# Patient Record
Sex: FEMALE | Race: WHITE | NOT HISPANIC OR LATINO | Employment: FULL TIME | URBAN - METROPOLITAN AREA
[De-identification: names, ages, dates, MRNs, and addresses within clinical notes are randomized per-mention and may not be internally consistent; named-entity substitution may affect disease eponyms.]

---

## 2017-01-09 ENCOUNTER — ALLSCRIPTS OFFICE VISIT (OUTPATIENT)
Dept: OTHER | Facility: OTHER | Age: 42
End: 2017-01-09

## 2017-04-12 ENCOUNTER — ALLSCRIPTS OFFICE VISIT (OUTPATIENT)
Dept: OTHER | Facility: OTHER | Age: 42
End: 2017-04-12

## 2017-04-24 ENCOUNTER — APPOINTMENT (OUTPATIENT)
Dept: URGENT CARE | Age: 42
End: 2017-04-24
Payer: OTHER MISCELLANEOUS

## 2017-04-24 ENCOUNTER — TRANSCRIBE ORDERS (OUTPATIENT)
Dept: URGENT CARE | Age: 42
End: 2017-04-24

## 2017-04-24 ENCOUNTER — HOSPITAL ENCOUNTER (OUTPATIENT)
Dept: RADIOLOGY | Age: 42
Discharge: HOME | End: 2017-04-24
Payer: OTHER MISCELLANEOUS

## 2017-04-24 DIAGNOSIS — M79.671 RIGHT FOOT PAIN: ICD-10-CM

## 2017-04-24 DIAGNOSIS — T14.90XA INJURY: ICD-10-CM

## 2017-04-24 DIAGNOSIS — T14.90XA INJURY: Primary | ICD-10-CM

## 2017-04-24 PROCEDURE — G0382 LEV 3 HOSP TYPE B ED VISIT: HCPCS | Performed by: PREVENTIVE MEDICINE

## 2017-04-24 PROCEDURE — 72100 X-RAY EXAM L-S SPINE 2/3 VWS: CPT

## 2017-04-24 PROCEDURE — 99283 EMERGENCY DEPT VISIT LOW MDM: CPT | Performed by: PREVENTIVE MEDICINE

## 2017-04-28 ENCOUNTER — APPOINTMENT (OUTPATIENT)
Dept: URGENT CARE | Age: 42
End: 2017-04-28
Payer: OTHER MISCELLANEOUS

## 2017-04-28 PROCEDURE — 99213 OFFICE O/P EST LOW 20 MIN: CPT | Performed by: PREVENTIVE MEDICINE

## 2017-05-05 ENCOUNTER — HOSPITAL ENCOUNTER (OUTPATIENT)
Dept: RADIOLOGY | Age: 42
Discharge: HOME | End: 2017-05-05
Payer: COMMERCIAL

## 2017-05-05 ENCOUNTER — ALLSCRIPTS OFFICE VISIT (OUTPATIENT)
Dept: OTHER | Facility: OTHER | Age: 42
End: 2017-05-05

## 2017-05-05 DIAGNOSIS — M79.671 PAIN OF RIGHT FOOT: ICD-10-CM

## 2017-05-05 PROCEDURE — 73630 X-RAY EXAM OF FOOT: CPT

## 2017-05-27 ENCOUNTER — APPOINTMENT (OUTPATIENT)
Dept: LAB | Facility: HOSPITAL | Age: 42
End: 2017-05-27
Payer: COMMERCIAL

## 2017-05-27 DIAGNOSIS — K91.2 POSTSURGICAL MALABSORPTION, NOT ELSEWHERE CLASSIFIED: ICD-10-CM

## 2017-05-27 DIAGNOSIS — E55.9 VITAMIN D DEFICIENCY: ICD-10-CM

## 2017-05-27 DIAGNOSIS — Z98.84 BARIATRIC SURGERY STATUS: ICD-10-CM

## 2017-05-27 DIAGNOSIS — E61.1 IRON DEFICIENCY: ICD-10-CM

## 2017-05-27 DIAGNOSIS — Z00.00 ENCOUNTER FOR GENERAL ADULT MEDICAL EXAMINATION WITHOUT ABNORMAL FINDINGS: ICD-10-CM

## 2017-05-27 DIAGNOSIS — E53.8 DEFICIENCY OF OTHER SPECIFIED B GROUP VITAMINS: ICD-10-CM

## 2017-05-27 LAB
ALBUMIN SERPL BCP-MCNC: 3.5 G/DL (ref 3.5–5)
ALP SERPL-CCNC: 84 U/L (ref 46–116)
ALT SERPL W P-5'-P-CCNC: 18 U/L (ref 12–78)
ANION GAP SERPL CALCULATED.3IONS-SCNC: 8 MMOL/L (ref 4–13)
AST SERPL W P-5'-P-CCNC: 18 U/L (ref 5–45)
BILIRUB SERPL-MCNC: 0.75 MG/DL (ref 0.2–1)
BUN SERPL-MCNC: 15 MG/DL (ref 5–25)
CALCIUM SERPL-MCNC: 8.3 MG/DL (ref 8.3–10.1)
CHLORIDE SERPL-SCNC: 106 MMOL/L (ref 100–108)
CO2 SERPL-SCNC: 27 MMOL/L (ref 21–32)
CREAT SERPL-MCNC: 0.65 MG/DL (ref 0.6–1.3)
ERYTHROCYTE [DISTWIDTH] IN BLOOD BY AUTOMATED COUNT: 15.9 % (ref 11.6–15.1)
GFR SERPL CREATININE-BSD FRML MDRD: >60 ML/MIN/1.73SQ M
GLUCOSE P FAST SERPL-MCNC: 87 MG/DL (ref 65–99)
HCT VFR BLD AUTO: 33.1 % (ref 34.8–46.1)
HGB BLD-MCNC: 10.1 G/DL (ref 11.5–15.4)
MCH RBC QN AUTO: 23.4 PG (ref 26.8–34.3)
MCHC RBC AUTO-ENTMCNC: 30.5 G/DL (ref 31.4–37.4)
MCV RBC AUTO: 77 FL (ref 82–98)
PLATELET # BLD AUTO: 292 THOUSANDS/UL (ref 149–390)
PMV BLD AUTO: 10 FL (ref 8.9–12.7)
POTASSIUM SERPL-SCNC: 3.7 MMOL/L (ref 3.5–5.3)
PROT SERPL-MCNC: 6.7 G/DL (ref 6.4–8.2)
RBC # BLD AUTO: 4.32 MILLION/UL (ref 3.81–5.12)
SODIUM SERPL-SCNC: 141 MMOL/L (ref 136–145)
WBC # BLD AUTO: 5.16 THOUSAND/UL (ref 4.31–10.16)

## 2017-05-27 PROCEDURE — 85027 COMPLETE CBC AUTOMATED: CPT

## 2017-05-27 PROCEDURE — 36415 COLL VENOUS BLD VENIPUNCTURE: CPT

## 2017-05-27 PROCEDURE — 80053 COMPREHEN METABOLIC PANEL: CPT

## 2017-05-30 ENCOUNTER — GENERIC CONVERSION - ENCOUNTER (OUTPATIENT)
Dept: OTHER | Facility: OTHER | Age: 42
End: 2017-05-30

## 2017-06-06 ENCOUNTER — ALLSCRIPTS OFFICE VISIT (OUTPATIENT)
Dept: OTHER | Facility: OTHER | Age: 42
End: 2017-06-06

## 2017-06-12 ENCOUNTER — GENERIC CONVERSION - ENCOUNTER (OUTPATIENT)
Dept: OTHER | Facility: OTHER | Age: 42
End: 2017-06-12

## 2017-06-12 ENCOUNTER — OFFICE VISIT (OUTPATIENT)
Dept: URGENT CARE | Age: 42
End: 2017-06-12
Payer: COMMERCIAL

## 2017-06-12 ENCOUNTER — APPOINTMENT (OUTPATIENT)
Dept: RADIOLOGY | Age: 42
End: 2017-06-12
Attending: PHYSICIAN ASSISTANT
Payer: COMMERCIAL

## 2017-06-12 ENCOUNTER — TRANSCRIBE ORDERS (OUTPATIENT)
Dept: URGENT CARE | Age: 42
End: 2017-06-12

## 2017-06-12 DIAGNOSIS — N92.0 EXCESSIVE AND FREQUENT MENSTRUATION WITH REGULAR CYCLE: ICD-10-CM

## 2017-06-12 DIAGNOSIS — D50.9 IRON DEFICIENCY ANEMIA: ICD-10-CM

## 2017-06-12 DIAGNOSIS — R07.81 PLEURODYNIA: ICD-10-CM

## 2017-06-12 DIAGNOSIS — R07.9 CHEST PAIN: ICD-10-CM

## 2017-06-12 DIAGNOSIS — Z78.0 ASYMPTOMATIC MENOPAUSAL STATE: ICD-10-CM

## 2017-06-12 DIAGNOSIS — N92.1 EXCESSIVE AND FREQUENT MENSTRUATION WITH IRREGULAR CYCLE: ICD-10-CM

## 2017-06-12 DIAGNOSIS — R29.890 LOSS OF HEIGHT: ICD-10-CM

## 2017-06-12 DIAGNOSIS — M40.209 KYPHOSIS: ICD-10-CM

## 2017-06-12 PROCEDURE — G0382 LEV 3 HOSP TYPE B ED VISIT: HCPCS | Performed by: FAMILY MEDICINE

## 2017-06-12 PROCEDURE — 71101 X-RAY EXAM UNILAT RIBS/CHEST: CPT

## 2017-06-13 ENCOUNTER — TRANSCRIBE ORDERS (OUTPATIENT)
Dept: ADMINISTRATIVE | Facility: HOSPITAL | Age: 42
End: 2017-06-13

## 2017-06-13 ENCOUNTER — ALLSCRIPTS OFFICE VISIT (OUTPATIENT)
Dept: OTHER | Facility: OTHER | Age: 42
End: 2017-06-13

## 2017-06-13 ENCOUNTER — HOSPITAL ENCOUNTER (OUTPATIENT)
Dept: CT IMAGING | Facility: HOSPITAL | Age: 42
Discharge: HOME | End: 2017-06-13
Attending: INTERNAL MEDICINE
Payer: COMMERCIAL

## 2017-06-13 DIAGNOSIS — R07.81 PLEURITIC CHEST PAIN: Primary | ICD-10-CM

## 2017-06-13 DIAGNOSIS — R07.81 PLEURODYNIA: ICD-10-CM

## 2017-06-13 PROCEDURE — 71275 CT ANGIOGRAPHY CHEST: CPT

## 2017-06-13 RX ADMIN — IOHEXOL 85 ML: 350 INJECTION, SOLUTION INTRAVENOUS at 14:09

## 2017-06-16 ENCOUNTER — ALLSCRIPTS OFFICE VISIT (OUTPATIENT)
Dept: OTHER | Facility: OTHER | Age: 42
End: 2017-06-16

## 2017-06-16 ENCOUNTER — APPOINTMENT (OUTPATIENT)
Dept: LAB | Facility: HOSPITAL | Age: 42
End: 2017-06-16
Attending: INTERNAL MEDICINE
Payer: COMMERCIAL

## 2017-06-16 DIAGNOSIS — R07.9 CHEST PAIN: ICD-10-CM

## 2017-06-16 DIAGNOSIS — D50.9 IRON DEFICIENCY ANEMIA: ICD-10-CM

## 2017-06-16 DIAGNOSIS — N92.0 EXCESSIVE AND FREQUENT MENSTRUATION WITH REGULAR CYCLE: ICD-10-CM

## 2017-06-16 LAB
IRON SATN MFR SERPL: 5 %
IRON SERPL-MCNC: 25 UG/DL (ref 50–170)
TIBC SERPL-MCNC: 458 UG/DL (ref 250–450)

## 2017-06-16 PROCEDURE — 83550 IRON BINDING TEST: CPT

## 2017-06-16 PROCEDURE — 83540 ASSAY OF IRON: CPT

## 2017-06-17 ENCOUNTER — APPOINTMENT (OUTPATIENT)
Dept: LAB | Facility: HOSPITAL | Age: 42
End: 2017-06-17
Attending: INTERNAL MEDICINE
Payer: COMMERCIAL

## 2017-06-17 DIAGNOSIS — R07.9 CHEST PAIN: ICD-10-CM

## 2017-06-17 DIAGNOSIS — N92.0 EXCESSIVE AND FREQUENT MENSTRUATION WITH REGULAR CYCLE: ICD-10-CM

## 2017-06-17 DIAGNOSIS — D50.9 IRON DEFICIENCY ANEMIA: ICD-10-CM

## 2017-06-17 LAB — HEMOCCULT STL QL IA: NEGATIVE

## 2017-06-17 PROCEDURE — G0328 FECAL BLOOD SCRN IMMUNOASSAY: HCPCS

## 2017-06-21 ENCOUNTER — GENERIC CONVERSION - ENCOUNTER (OUTPATIENT)
Dept: OTHER | Facility: OTHER | Age: 42
End: 2017-06-21

## 2017-06-21 RX ORDER — SODIUM CHLORIDE 9 MG/ML
20 INJECTION, SOLUTION INTRAVENOUS CONTINUOUS
Status: DISCONTINUED | OUTPATIENT
Start: 2017-06-22 | End: 2017-06-25 | Stop reason: HOSPADM

## 2017-06-22 ENCOUNTER — HOSPITAL ENCOUNTER (OUTPATIENT)
Dept: INFUSION CENTER | Facility: CLINIC | Age: 42
Discharge: HOME | End: 2017-06-22
Payer: COMMERCIAL

## 2017-06-22 VITALS
DIASTOLIC BLOOD PRESSURE: 69 MMHG | TEMPERATURE: 97.6 F | HEART RATE: 67 BPM | SYSTOLIC BLOOD PRESSURE: 111 MMHG | RESPIRATION RATE: 16 BRPM

## 2017-06-22 PROCEDURE — 96367 TX/PROPH/DG ADDL SEQ IV INF: CPT

## 2017-06-22 PROCEDURE — 96365 THER/PROPH/DIAG IV INF INIT: CPT

## 2017-06-22 RX ORDER — FERROUS SULFATE 325(65) MG
325 TABLET ORAL 2 TIMES DAILY
COMMUNITY
End: 2022-05-03

## 2017-06-22 RX ORDER — CHOLECALCIFEROL (VITAMIN D3) 125 MCG
500 CAPSULE ORAL DAILY
COMMUNITY
End: 2022-06-13

## 2017-06-22 RX ORDER — DIPHENOXYLATE HYDROCHLORIDE AND ATROPINE SULFATE 2.5; .025 MG/1; MG/1
1 TABLET ORAL 2 TIMES DAILY
COMMUNITY
End: 2022-06-13

## 2017-06-22 RX ADMIN — SODIUM CHLORIDE 20 ML/HR: 0.9 INJECTION, SOLUTION INTRAVENOUS at 08:50

## 2017-06-22 RX ADMIN — IRON SUCROSE 200 MG: 20 INJECTION, SOLUTION INTRAVENOUS at 09:12

## 2017-06-22 RX ADMIN — DIPHENHYDRAMINE HYDROCHLORIDE 25 MG: 50 INJECTION, SOLUTION INTRAMUSCULAR; INTRAVENOUS at 08:55

## 2017-06-22 NOTE — PROGRESS NOTES
Pt here for first Venofer infusion  Benadryl premed given as ordered  Pt tolerated Venofer well, no c/o discomfort  Pt oriented to unit and nursing routines  Intake assessment completed  AVS given, pt aware of next appointment

## 2017-06-23 ENCOUNTER — ALLSCRIPTS OFFICE VISIT (OUTPATIENT)
Dept: OTHER | Facility: OTHER | Age: 42
End: 2017-06-23

## 2017-06-23 ENCOUNTER — TRANSCRIBE ORDERS (OUTPATIENT)
Dept: ADMINISTRATIVE | Facility: HOSPITAL | Age: 42
End: 2017-06-23

## 2017-06-23 DIAGNOSIS — R91.8 LUNG MASS: Primary | ICD-10-CM

## 2017-06-28 RX ORDER — SODIUM CHLORIDE 9 MG/ML
20 INJECTION, SOLUTION INTRAVENOUS CONTINUOUS
Status: DISCONTINUED | OUTPATIENT
Start: 2017-06-29 | End: 2017-07-02 | Stop reason: HOSPADM

## 2017-06-29 ENCOUNTER — HOSPITAL ENCOUNTER (OUTPATIENT)
Dept: INFUSION CENTER | Facility: CLINIC | Age: 42
Discharge: HOME | End: 2017-06-29
Payer: COMMERCIAL

## 2017-06-29 VITALS — HEART RATE: 62 BPM | TEMPERATURE: 97.1 F | SYSTOLIC BLOOD PRESSURE: 119 MMHG | DIASTOLIC BLOOD PRESSURE: 73 MMHG

## 2017-06-29 PROCEDURE — 96367 TX/PROPH/DG ADDL SEQ IV INF: CPT

## 2017-06-29 PROCEDURE — 96365 THER/PROPH/DIAG IV INF INIT: CPT

## 2017-06-29 RX ADMIN — IRON SUCROSE 200 MG: 20 INJECTION, SOLUTION INTRAVENOUS at 14:13

## 2017-06-29 RX ADMIN — DIPHENHYDRAMINE HYDROCHLORIDE 25 MG: 50 INJECTION, SOLUTION INTRAMUSCULAR; INTRAVENOUS at 13:51

## 2017-06-29 RX ADMIN — SODIUM CHLORIDE 20 ML/HR: 0.9 INJECTION, SOLUTION INTRAVENOUS at 13:48

## 2017-06-29 NOTE — PROGRESS NOTES
Pt here for Venofer infusion  Tolerated well, no c/o discomfort  AVS declined, pt aware of next appointment

## 2017-07-05 RX ORDER — SODIUM CHLORIDE 9 MG/ML
20 INJECTION, SOLUTION INTRAVENOUS CONTINUOUS
Status: DISCONTINUED | OUTPATIENT
Start: 2017-07-06 | End: 2017-07-09 | Stop reason: HOSPADM

## 2017-07-06 ENCOUNTER — HOSPITAL ENCOUNTER (OUTPATIENT)
Dept: INFUSION CENTER | Facility: CLINIC | Age: 42
Discharge: HOME | End: 2017-07-06
Payer: COMMERCIAL

## 2017-07-06 VITALS
RESPIRATION RATE: 16 BRPM | DIASTOLIC BLOOD PRESSURE: 79 MMHG | HEART RATE: 65 BPM | TEMPERATURE: 97.2 F | SYSTOLIC BLOOD PRESSURE: 132 MMHG

## 2017-07-06 PROCEDURE — 96375 TX/PRO/DX INJ NEW DRUG ADDON: CPT

## 2017-07-06 PROCEDURE — 96365 THER/PROPH/DIAG IV INF INIT: CPT

## 2017-07-06 RX ADMIN — IRON SUCROSE 200 MG: 20 INJECTION, SOLUTION INTRAVENOUS at 14:29

## 2017-07-06 RX ADMIN — DIPHENHYDRAMINE HYDROCHLORIDE 25 MG: 50 INJECTION, SOLUTION INTRAMUSCULAR; INTRAVENOUS at 14:05

## 2017-07-06 RX ADMIN — SODIUM CHLORIDE 20 ML/HR: 0.9 INJECTION, SOLUTION INTRAVENOUS at 14:05

## 2017-07-06 NOTE — PROGRESS NOTES
Pt received IV venofer without complications  Pt aware to return for next infusion appointment  AVS provided

## 2017-07-07 ENCOUNTER — ALLSCRIPTS OFFICE VISIT (OUTPATIENT)
Dept: OTHER | Facility: OTHER | Age: 42
End: 2017-07-07

## 2017-07-12 RX ORDER — SODIUM CHLORIDE 9 MG/ML
20 INJECTION, SOLUTION INTRAVENOUS CONTINUOUS
Status: DISCONTINUED | OUTPATIENT
Start: 2017-07-13 | End: 2017-07-16 | Stop reason: HOSPADM

## 2017-07-13 ENCOUNTER — HOSPITAL ENCOUNTER (OUTPATIENT)
Dept: INFUSION CENTER | Facility: CLINIC | Age: 42
Discharge: HOME | End: 2017-07-13
Payer: COMMERCIAL

## 2017-07-13 PROCEDURE — 96365 THER/PROPH/DIAG IV INF INIT: CPT

## 2017-07-13 PROCEDURE — 96375 TX/PRO/DX INJ NEW DRUG ADDON: CPT

## 2017-07-13 RX ADMIN — SODIUM CHLORIDE 20 ML/HR: 0.9 INJECTION, SOLUTION INTRAVENOUS at 14:10

## 2017-07-13 RX ADMIN — IRON SUCROSE 200 MG: 20 INJECTION, SOLUTION INTRAVENOUS at 14:41

## 2017-07-13 RX ADMIN — DIPHENHYDRAMINE HYDROCHLORIDE 25 MG: 50 INJECTION, SOLUTION INTRAMUSCULAR; INTRAVENOUS at 14:10

## 2017-07-13 NOTE — PROGRESS NOTES
Pt tolerated Venofer infusion without any adverse reaction, declined AVS today, aware of upcoming appts

## 2017-07-18 ENCOUNTER — HOSPITAL ENCOUNTER (OUTPATIENT)
Dept: RADIOLOGY | Facility: HOSPITAL | Age: 42
Discharge: HOME | End: 2017-07-18
Attending: INTERNAL MEDICINE
Payer: COMMERCIAL

## 2017-07-18 DIAGNOSIS — R91.8 LUNG MASS: ICD-10-CM

## 2017-07-18 PROCEDURE — 71260 CT THORAX DX C+: CPT

## 2017-07-18 RX ADMIN — IOHEXOL 85 ML: 350 INJECTION, SOLUTION INTRAVENOUS at 18:08

## 2017-07-19 RX ORDER — SODIUM CHLORIDE 9 MG/ML
20 INJECTION, SOLUTION INTRAVENOUS CONTINUOUS
Status: DISCONTINUED | OUTPATIENT
Start: 2017-07-20 | End: 2017-07-23 | Stop reason: HOSPADM

## 2017-07-20 ENCOUNTER — HOSPITAL ENCOUNTER (OUTPATIENT)
Dept: INFUSION CENTER | Facility: CLINIC | Age: 42
Discharge: HOME | End: 2017-07-20
Payer: COMMERCIAL

## 2017-07-20 VITALS
HEART RATE: 70 BPM | RESPIRATION RATE: 18 BRPM | TEMPERATURE: 97.6 F | SYSTOLIC BLOOD PRESSURE: 111 MMHG | DIASTOLIC BLOOD PRESSURE: 75 MMHG

## 2017-07-20 PROCEDURE — 96365 THER/PROPH/DIAG IV INF INIT: CPT

## 2017-07-20 PROCEDURE — 96367 TX/PROPH/DG ADDL SEQ IV INF: CPT

## 2017-07-20 RX ADMIN — DIPHENHYDRAMINE HYDROCHLORIDE 25 MG: 50 INJECTION, SOLUTION INTRAMUSCULAR; INTRAVENOUS at 13:59

## 2017-07-20 RX ADMIN — SODIUM CHLORIDE 20 ML/HR: 0.9 INJECTION, SOLUTION INTRAVENOUS at 13:56

## 2017-07-20 RX ADMIN — IRON SUCROSE 200 MG: 20 INJECTION, SOLUTION INTRAVENOUS at 14:20

## 2017-07-27 ENCOUNTER — GENERIC CONVERSION - ENCOUNTER (OUTPATIENT)
Dept: OTHER | Facility: OTHER | Age: 42
End: 2017-07-27

## 2017-07-31 ENCOUNTER — GENERIC CONVERSION - ENCOUNTER (OUTPATIENT)
Dept: OTHER | Facility: OTHER | Age: 42
End: 2017-07-31

## 2017-08-01 ENCOUNTER — GENERIC CONVERSION - ENCOUNTER (OUTPATIENT)
Dept: OTHER | Facility: OTHER | Age: 42
End: 2017-08-01

## 2017-09-04 DIAGNOSIS — N92.0 EXCESSIVE AND FREQUENT MENSTRUATION WITH REGULAR CYCLE: ICD-10-CM

## 2017-09-04 DIAGNOSIS — D50.9 IRON DEFICIENCY ANEMIA: ICD-10-CM

## 2017-09-04 DIAGNOSIS — R07.9 CHEST PAIN: ICD-10-CM

## 2017-09-12 ENCOUNTER — APPOINTMENT (OUTPATIENT)
Dept: LAB | Age: 42
End: 2017-09-12
Payer: COMMERCIAL

## 2017-09-12 ENCOUNTER — TRANSCRIBE ORDERS (OUTPATIENT)
Dept: ADMINISTRATIVE | Age: 42
End: 2017-09-12

## 2017-09-12 DIAGNOSIS — D50.9 IRON DEFICIENCY ANEMIA, UNSPECIFIED: ICD-10-CM

## 2017-09-12 DIAGNOSIS — D50.9 IRON DEFICIENCY ANEMIA, UNSPECIFIED: Primary | ICD-10-CM

## 2017-09-12 DIAGNOSIS — N92.0 EXCESSIVE AND FREQUENT MENSTRUATION WITH REGULAR CYCLE: ICD-10-CM

## 2017-09-12 DIAGNOSIS — D50.9 IRON DEFICIENCY ANEMIA: ICD-10-CM

## 2017-09-12 DIAGNOSIS — R07.9 CHEST PAIN: ICD-10-CM

## 2017-09-12 LAB
BASOPHILS # BLD AUTO: 0.03 THOUSANDS/ΜL (ref 0–0.1)
BASOPHILS NFR BLD AUTO: 0 % (ref 0–1)
EOSINOPHIL # BLD AUTO: 0.21 THOUSAND/ΜL (ref 0–0.61)
EOSINOPHIL NFR BLD AUTO: 3 % (ref 0–6)
ERYTHROCYTE [DISTWIDTH] IN BLOOD BY AUTOMATED COUNT: 18.6 % (ref 11.6–15.1)
HCT VFR BLD AUTO: 40.5 % (ref 34.8–46.1)
HGB BLD-MCNC: 13.4 G/DL (ref 11.5–15.4)
IRON SATN MFR SERPL: 22 %
IRON SERPL-MCNC: 81 UG/DL (ref 50–170)
LYMPHOCYTES # BLD AUTO: 1.99 THOUSANDS/ΜL (ref 0.6–4.47)
LYMPHOCYTES NFR BLD AUTO: 27 % (ref 14–44)
MCH RBC QN AUTO: 28.7 PG (ref 26.8–34.3)
MCHC RBC AUTO-ENTMCNC: 33.1 G/DL (ref 31.4–37.4)
MCV RBC AUTO: 87 FL (ref 82–98)
MONOCYTES # BLD AUTO: 0.69 THOUSAND/ΜL (ref 0.17–1.22)
MONOCYTES NFR BLD AUTO: 9 % (ref 4–12)
NEUTROPHILS # BLD AUTO: 4.54 THOUSANDS/ΜL (ref 1.85–7.62)
NEUTS SEG NFR BLD AUTO: 61 % (ref 43–75)
NRBC BLD AUTO-RTO: 0 /100 WBCS
PLATELET # BLD AUTO: 209 THOUSANDS/UL (ref 149–390)
PMV BLD AUTO: 10.5 FL (ref 8.9–12.7)
RBC # BLD AUTO: 4.67 MILLION/UL (ref 3.81–5.12)
TIBC SERPL-MCNC: 372 UG/DL (ref 250–450)
WBC # BLD AUTO: 7.47 THOUSAND/UL (ref 4.31–10.16)

## 2017-09-12 PROCEDURE — 83550 IRON BINDING TEST: CPT

## 2017-09-12 PROCEDURE — 83540 ASSAY OF IRON: CPT

## 2017-09-12 PROCEDURE — 36415 COLL VENOUS BLD VENIPUNCTURE: CPT

## 2017-09-12 PROCEDURE — 85025 COMPLETE CBC W/AUTO DIFF WBC: CPT

## 2017-09-15 ENCOUNTER — GENERIC CONVERSION - ENCOUNTER (OUTPATIENT)
Dept: OTHER | Facility: OTHER | Age: 42
End: 2017-09-15

## 2017-11-13 ENCOUNTER — GENERIC CONVERSION - ENCOUNTER (OUTPATIENT)
Dept: OTHER | Facility: OTHER | Age: 42
End: 2017-11-13

## 2017-12-26 ENCOUNTER — ALLSCRIPTS OFFICE VISIT (OUTPATIENT)
Dept: OTHER | Facility: OTHER | Age: 42
End: 2017-12-26

## 2017-12-27 NOTE — PROGRESS NOTES
Assessment   1  Acute URI (465 9) (J06 9)   2  Back pain (724 5) (M54 9)    Plan   Acute URI    · Azithromycin 250 MG Oral Tablet; TAKE AS DIRECTED PER PACKAGE    INSTRUCTIONS   · Promethazine-DM 6 25-15 MG/5ML Oral Syrup; TAKE 5 ML EVERY 4 TO 6 HOURS    AS NEEDED FOR COUGH  Back pain    · Cyclobenzaprine HCl - 10 MG Oral Tablet; TAKE 1 TABLET 3 TIMES DAILY    Discussion/Summary   The patient was counseled regarding diagnostic results,-- prognosis,-- impressions  Chief Complaint   pt c/o post nasal drip, sore throat, coughing because of coughing her back hurts, pt states this started on sunday  History of Present Illness   Cold Symptoms: [facility] presents with complaints of cold symptoms  Associated symptoms include sneezing,-- nasal congestion,-- post nasal drainage,-- sore throat,-- dry cough-- and-- weakness, but-- no headache,-- no nausea,-- no vomiting,-- no diarrhea,-- no fever-- and-- no chills  HPI: 43year old female with PND, sour throat and coughing which started on Sunday  She has tried Claritin, Nyquil and Robitussin for her cough but these have not been helpful  Started using Flonase yesterday  Denies fever, chills, sweats  She has had sick contact over the holiday weekend  Review of Systems        Constitutional: feeling poorly-- and-- feeling tired, but-- no fever-- and-- no chills  ENT: sore throat-- and-- ear pressure, no pain, but-- no nosebleeds--       The patient presents with complaints of nasal discharge (clear)  Cardiovascular: no chest pain,-- no palpitations-- and-- no lower extremity edema  Respiratory: PND, but-- no shortness of breath,-- no wheezing-- and-- no shortness of breath during exertion--       The patient presents with complaints of cough (Unable to bring up sputum  Has coughed so hard she threw out her back)  Gastrointestinal: no nausea,-- no constipation-- and-- no diarrhea  Integumentary: no rashes        Neurological: no headache,-- no numbness-- and-- no dizziness  ROS reviewed  Active Problems   1  Abnormal CT of the chest (793 2) (R93 8)   2  Anemia, iron deficiency (280 9) (D50 9)   3  Anterior pleuritic pain (786 52) (R07 81)   4  Back pain (724 5) (M54 9)   5  Bloating (787 3) (R14 0)   6  Bulla, lung (492 0) (J43 9)   7  Coccydynia (724 79) (M53 3)   8  Counseling for travel (V65 49) (Z71 89)   9  Dehydration (276 51) (E86 0)   10  Encounter for cosmetic surgery (V50 1) (Z41 1)   11  History of allergy (V15 09) (Z88 9)   12  Hypocalcemia (275 41) (E83 51)   13  Iron deficiency (280 9) (E61 1)   14  Kyphosis deformity of spine (737 10) (M40 209)   15  Loss of height (781 91) (R29 890)   16  Menopause (627 2) (Z78 0)   17  Menorrhagia (626 2) (N92 0)   18  Denied: History of Mental disorder   23  Metrorrhagia (626 6) (N92 1)   20  Neoplasm of skin (239 2) (D49 2)   21  Obesity (278 00) (E66 9)   22  Postgastrectomy malabsorption (579 3) (K91 2,Z90 3)   23  Primary osteoarthritis of right foot (715 17) (M19 071)   24  Right-sided chest pain (786 50) (R07 9)   25  Status post gastric bypass for obesity (V45 86) (Z98 84)   26  Vitamin B deficiency (266 9) (E53 9)   27  Vitamin B12 deficiency (266 2) (E53 8)   28  Vitamin D deficiency (268 9) (E55 9)    Past Medical History   Active Problems And Past Medical History Reviewed: The active problems and past medical history were reviewed and updated today  Surgical History   Surgical History Reviewed: The surgical history was reviewed and updated today  Social History    · Denied: History of Alcohol Use (History)   · Being A Social Drinker   · Denied: History of Drug Use   · Marital History - Currently    · Never A Smoker   · Zoroastrian  The social history was reviewed and is unchanged  Family History   Family History Reviewed: The family history was reviewed and updated today  Current Meds    1   Calcium 500 +D 500-400 MG-UNIT Oral Tablet; Take 1 tablet twice daily; Therapy: 08Apr2016 to (Rip Camryn)  Requested for: 27JMP0261; Last     Rx:[id number] Ordered   2  Centrum Ultra Womens TABS; TAKE 1 TABLET EVERY 12 HOURS; Therapy: (USPPBLOD:87OWC1569) to Recorded   3  Diclofenac Sodium 1 % Transdermal Gel; APPLY SPARINGLY TO AFFECTED AREA(S)     ONCE DAILY; Therapy: 61QLA9505 to (Last Rx:06Jun2017)  Requested for: 06Jun2017 Ordered   4  Fluticasone Propionate 50 MCG/ACT Nasal Suspension; USE 2 SPRAYS IN EACH     NOSTRIL ONCE DAILY; Therapy: 20OHU6980 to (Last EY:05ORA6419)  Requested for: 55AGB4321 Ordered   5  Iron 65 MG TABS; twice daily; Therapy: (MWPKWKLO:11ZQO8811) to Recorded   6  TraMADol HCl - 50 MG Oral Tablet; take 1 tablet every 8 hours as needed for pain; Therapy: 40ENM8165 to (Last Rx:19Oct2016) Ordered   7  Vitamin B12 100 MCG Oral Tablet; twice daily; Therapy: (PCFPRVME:44OJS3862) to Recorded   8  Vitamin C Plus 1000 MG Oral Tablet; twice daily; Therapy: (DSGQJLBS:50QSZ2085) to Recorded   9  Vitamin D3 5000 UNIT Oral Capsule; TAKE 1 CAPSULE BY MOUTH EVERY DAY; Therapy: 08Apr2016 to (Rip Camryn)  Requested for: 61XEM1026; Last     Rx:[id number] Ordered    Allergies   1  No Known Drug Allergies  2  Seasonal    Vitals    Recorded: 02GLT3117 01:43PM   Temperature 97 6 F, Tympanic   Heart Rate 68   Systolic 265, LUE, Sitting   Diastolic 74, LUE, Sitting   Height 5 ft 6 25 in   Weight 149 lb 9 6 oz   BMI Calculated 23 96   BSA Calculated 1 77   O2 Saturation 98, RA     Physical Exam        Constitutional      General appearance: No acute distress, well appearing and well nourished  Eyes      Conjunctiva and lids: No swelling, erythema or discharge  Pupils and irises: Equal, round and reactive to light         Ears, Nose, Mouth, and Throat      External inspection of ears and nose: Normal        Otoscopic examination: Abnormal   The right tympanic membrane was bulging, but-- was not red  The left tympanic membrane was bulging, but-- was not red  Nasal mucosa, septum, and turbinates: Abnormal   The bilateral nasal mucosa was edematous  -- PND  Pulmonary      Respiratory effort: No increased work of breathing or signs of respiratory distress  Auscultation of lungs: Clear to auscultation  Cardiovascular      Palpation of heart: Normal PMI, no thrills  Auscultation of heart: Normal rate and rhythm, normal S1 and S2, without murmurs  Examination of extremities for edema and/or varicosities: Normal        Lymphatic      Palpation of lymph nodes in neck: No lymphadenopathy  Skin      Skin and subcutaneous tissue: Normal without rashes or lesions         Psychiatric      Orientation to person, place, and time: Normal        Mood and affect: Normal           Signatures    Electronically signed by : Isaias Pallas, MD; Dec 26 2017  2:25PM EST                       (Author)

## 2018-01-03 ENCOUNTER — HOSPITAL ENCOUNTER (EMERGENCY)
Facility: HOSPITAL | Age: 43
Discharge: HOME | End: 2018-01-04
Attending: EMERGENCY MEDICINE | Admitting: EMERGENCY MEDICINE
Payer: COMMERCIAL

## 2018-01-03 ENCOUNTER — OFFICE VISIT (OUTPATIENT)
Dept: URGENT CARE | Facility: MEDICAL CENTER | Age: 43
End: 2018-01-03
Payer: COMMERCIAL

## 2018-01-03 ENCOUNTER — APPOINTMENT (EMERGENCY)
Dept: ULTRASOUND IMAGING | Facility: HOSPITAL | Age: 43
End: 2018-01-03
Payer: COMMERCIAL

## 2018-01-03 DIAGNOSIS — I88.0 MESENTERIC ADENITIS: ICD-10-CM

## 2018-01-03 DIAGNOSIS — B34.9 VIRAL SYNDROME: ICD-10-CM

## 2018-01-03 DIAGNOSIS — R10.10 UPPER ABDOMINAL PAIN: Primary | ICD-10-CM

## 2018-01-03 LAB
ALBUMIN SERPL BCP-MCNC: 3.5 G/DL (ref 3.5–5)
ALP SERPL-CCNC: 82 U/L (ref 46–116)
ALT SERPL W P-5'-P-CCNC: 20 U/L (ref 12–78)
ANION GAP SERPL CALCULATED.3IONS-SCNC: 6 MMOL/L (ref 4–13)
AST SERPL W P-5'-P-CCNC: 21 U/L (ref 5–45)
BASOPHILS # BLD AUTO: 0.06 THOUSANDS/ΜL (ref 0–0.1)
BASOPHILS NFR BLD AUTO: 1 % (ref 0–1)
BILIRUB SERPL-MCNC: 0.5 MG/DL (ref 0.2–1)
BUN SERPL-MCNC: 19 MG/DL (ref 5–25)
CALCIUM SERPL-MCNC: 8.9 MG/DL (ref 8.3–10.1)
CHLORIDE SERPL-SCNC: 104 MMOL/L (ref 100–108)
CO2 SERPL-SCNC: 29 MMOL/L (ref 21–32)
CREAT SERPL-MCNC: 0.56 MG/DL (ref 0.6–1.3)
EOSINOPHIL # BLD AUTO: 0.12 THOUSAND/ΜL (ref 0–0.61)
EOSINOPHIL NFR BLD AUTO: 2 % (ref 0–6)
ERYTHROCYTE [DISTWIDTH] IN BLOOD BY AUTOMATED COUNT: 13 % (ref 11.6–15.1)
GFR SERPL CREATININE-BSD FRML MDRD: 115 ML/MIN/1.73SQ M
GLUCOSE SERPL-MCNC: 86 MG/DL (ref 65–140)
HCT VFR BLD AUTO: 40 % (ref 34.8–46.1)
HGB BLD-MCNC: 13.4 G/DL (ref 11.5–15.4)
LIPASE SERPL-CCNC: 127 U/L (ref 73–393)
LYMPHOCYTES # BLD AUTO: 1.43 THOUSANDS/ΜL (ref 0.6–4.47)
LYMPHOCYTES NFR BLD AUTO: 19 % (ref 14–44)
MCH RBC QN AUTO: 31.1 PG (ref 26.8–34.3)
MCHC RBC AUTO-ENTMCNC: 33.5 G/DL (ref 31.4–37.4)
MCV RBC AUTO: 93 FL (ref 82–98)
MONOCYTES # BLD AUTO: 0.85 THOUSAND/ΜL (ref 0.17–1.22)
MONOCYTES NFR BLD AUTO: 11 % (ref 4–12)
NEUTROPHILS # BLD AUTO: 5.14 THOUSANDS/ΜL (ref 1.85–7.62)
NEUTS SEG NFR BLD AUTO: 67 % (ref 43–75)
NRBC BLD AUTO-RTO: 0 /100 WBCS
PLATELET # BLD AUTO: 165 THOUSANDS/UL (ref 149–390)
PMV BLD AUTO: 10.4 FL (ref 8.9–12.7)
POTASSIUM SERPL-SCNC: 4.1 MMOL/L (ref 3.5–5.3)
PROT SERPL-MCNC: 6.8 G/DL (ref 6.4–8.2)
RBC # BLD AUTO: 4.31 MILLION/UL (ref 3.81–5.12)
SODIUM SERPL-SCNC: 139 MMOL/L (ref 136–145)
WBC # BLD AUTO: 7.6 THOUSAND/UL (ref 4.31–10.16)

## 2018-01-03 PROCEDURE — 80053 COMPREHEN METABOLIC PANEL: CPT | Performed by: EMERGENCY MEDICINE

## 2018-01-03 PROCEDURE — 85025 COMPLETE CBC W/AUTO DIFF WBC: CPT | Performed by: EMERGENCY MEDICINE

## 2018-01-03 PROCEDURE — 99213 OFFICE O/P EST LOW 20 MIN: CPT

## 2018-01-03 PROCEDURE — 96360 HYDRATION IV INFUSION INIT: CPT

## 2018-01-03 PROCEDURE — 83690 ASSAY OF LIPASE: CPT | Performed by: EMERGENCY MEDICINE

## 2018-01-03 PROCEDURE — 36415 COLL VENOUS BLD VENIPUNCTURE: CPT | Performed by: EMERGENCY MEDICINE

## 2018-01-03 PROCEDURE — 76705 ECHO EXAM OF ABDOMEN: CPT

## 2018-01-03 RX ORDER — SUCRALFATE ORAL 1 G/10ML
1000 SUSPENSION ORAL ONCE
Status: COMPLETED | OUTPATIENT
Start: 2018-01-03 | End: 2018-01-03

## 2018-01-03 RX ADMIN — IOHEXOL 50 ML: 240 INJECTION, SOLUTION INTRATHECAL; INTRAVASCULAR; INTRAVENOUS; ORAL at 23:30

## 2018-01-03 RX ADMIN — SODIUM CHLORIDE 1000 ML: 0.9 INJECTION, SOLUTION INTRAVENOUS at 22:35

## 2018-01-03 RX ADMIN — SUCRALFATE 1000 MG: 1 SUSPENSION ORAL at 23:42

## 2018-01-04 ENCOUNTER — APPOINTMENT (EMERGENCY)
Dept: CT IMAGING | Facility: HOSPITAL | Age: 43
End: 2018-01-04
Payer: COMMERCIAL

## 2018-01-04 VITALS
DIASTOLIC BLOOD PRESSURE: 74 MMHG | HEART RATE: 79 BPM | OXYGEN SATURATION: 95 % | RESPIRATION RATE: 18 BRPM | WEIGHT: 150 LBS | TEMPERATURE: 98.9 F | SYSTOLIC BLOOD PRESSURE: 123 MMHG

## 2018-01-04 PROCEDURE — 99284 EMERGENCY DEPT VISIT MOD MDM: CPT

## 2018-01-04 PROCEDURE — 74177 CT ABD & PELVIS W/CONTRAST: CPT

## 2018-01-04 RX ORDER — SUCRALFATE ORAL 1 G/10ML
1 SUSPENSION ORAL 4 TIMES DAILY
Qty: 420 ML | Refills: 0 | Status: SHIPPED | OUTPATIENT
Start: 2018-01-04 | End: 2019-07-08 | Stop reason: ALTCHOICE

## 2018-01-04 RX ADMIN — IOHEXOL 100 ML: 350 INJECTION, SOLUTION INTRAVENOUS at 00:52

## 2018-01-04 NOTE — ED PROVIDER NOTES
History  Chief Complaint   Patient presents with    Abdominal Pain     Pt states she has right sided abd pain that radiates to left side since today, denies NDV, hx of gastric bypass, pain is constant and feels sharp per pt      31 yo female presenting with upper abd discomfort and loss of appetite  Pain was mild yesterday and started mid day  Persisted today and worsened this evening at work  Loss of appetite all day, ate yogurt at work and had to "force it down"  Has some discomfort into mid upper back  No Barr's on exam but epigastric and RUQ tenderness  History provided by:  Patient   used: No    Abdominal Pain   Pain location:  Epigastric and RUQ  Pain quality: aching    Pain radiation: between shoulder blades  Pain severity:  Mild  Onset quality:  Gradual  Duration:  26 hours  Timing:  Constant  Progression:  Worsening  Chronicity:  New  Relieved by:  Nothing  Worsened by:  Nothing  Ineffective treatments:  None tried  Associated symptoms: anorexia    Associated symptoms: no chest pain, no chills, no diarrhea, no dysuria, no fatigue, no fever, no flatus, no hematuria, no nausea, no shortness of breath, no sore throat, no vaginal bleeding, no vaginal discharge and no vomiting    Risk factors: multiple surgeries (gastric bypass, tubal, appy)        Prior to Admission Medications   Prescriptions Last Dose Informant Patient Reported? Taking?    Ascorbic Acid ([employer]S VITAMIN C) 500 MG CHEW  Self Yes Yes   Sig: Chew 1 tablet 2 (two) times a day   calcium carbonate-vitamin D (OSCAL-D) 500 mg-200 units per tablet  Self Yes Yes   Sig: Take 1 tablet by mouth 2 (two) times a day with meals   cyanocobalamin (VITAMIN B-12) 500 mcg tablet   Yes Yes   Sig: Take 500 mcg by mouth daily   ferrous sulfate ([employer]S IRON) 325 (65 Fe) mg tablet   Yes Yes   Sig: Take 325 mg by mouth 2 (two) times a day   multivitamin (THERAGRAN) TABS  Self Yes Yes   Sig: Take 1 tablet by mouth 2 (two) times a day Facility-Administered Medications: None       History reviewed  No pertinent past medical history  Past Surgical History:   Procedure Laterality Date    APPENDECTOMY      BRAIN SURGERY      AVM    BREAST LUMPECTOMY Right 1992     SECTION  1996    HITESH-EN-Y PROCEDURE  2014    TONSILLECTOMY      including adenoids       History reviewed  No pertinent family history  I have reviewed and agree with the history as documented  Social History   Substance Use Topics    Smoking status: Never Smoker    Smokeless tobacco: Never Used    Alcohol use Yes      Comment: socially        Review of Systems   Constitutional: Positive for appetite change  Negative for chills, fatigue and fever  HENT: Negative for congestion and sore throat  Eyes: Negative for visual disturbance  Respiratory: Negative for shortness of breath  Cardiovascular: Negative for chest pain  Gastrointestinal: Positive for abdominal pain (epigastric, RUQ) and anorexia  Negative for diarrhea, flatus, nausea and vomiting  Genitourinary: Negative for dysuria, frequency, hematuria, vaginal bleeding and vaginal discharge  Musculoskeletal: Negative for back pain, neck pain and neck stiffness  Skin: Negative for pallor and rash  Allergic/Immunologic: Negative for immunocompromised state  Neurological: Negative for light-headedness and headaches  Psychiatric/Behavioral: Negative for confusion  All other systems reviewed and are negative        Physical Exam  ED Triage Vitals   Temperature Pulse Respirations Blood Pressure SpO2   18 22018 22018 22018   98 9 °F (37 2 °C) 78 18 128/57 99 %      Temp Source Heart Rate Source Patient Position - Orthostatic VS BP Location FiO2 (%)   18 22018 22018 2336 18 --   Temporal Monitor Lying Right arm       Pain Score       18       No Pain           Orthostatic Vital Signs  Vitals: 01/03/18 2206 01/03/18 2336 01/04/18 0059   BP: 128/57 131/79 123/74   Pulse: 78 75 79   Patient Position - Orthostatic VS:  Lying Lying       Physical Exam   Constitutional: She is oriented to person, place, and time  She appears well-developed and well-nourished  No distress  HENT:   Head: Normocephalic and atraumatic  Right Ear: External ear normal    Left Ear: External ear normal    MM tachy   Eyes: EOM are normal  Pupils are equal, round, and reactive to light  Neck: Normal range of motion  Neck supple  Cardiovascular: Normal rate and regular rhythm  No murmur heard  Pulmonary/Chest: Effort normal and breath sounds normal  No respiratory distress  Abdominal: Soft  Bowel sounds are normal  There is tenderness (epigastric, RUQ, neg [place])  Musculoskeletal: Normal range of motion  Neurological: She is alert and oriented to person, place, and time  Skin: Skin is warm  No rash noted  No pallor  Psychiatric: She has a normal mood and affect  Her behavior is normal    Nursing note and vitals reviewed        ED Medications  Medications   sodium chloride 0 9 % bolus 1,000 mL (0 mL Intravenous Stopped 1/3/18 2342)   sucralfate (CARAFATE) oral suspension 1,000 mg (1,000 mg Oral Given 1/3/18 2342)   iohexol (OMNIPAQUE) 350 MG/ML injection (SINGLE-DOSE) 100 mL (100 mL Intravenous Given 1/4/18 0052)   iohexol (OMNIPAQUE) 240 MG/ML solution 50 mL (50 mL Oral Given 1/3/18 2330)       Diagnostic Studies  Results Reviewed     Procedure Component Value Units Date/Time    Comprehensive metabolic panel [[id number]]  (Abnormal) Collected:  01/03/18 2234    Lab Status:  Final result Specimen:  Blood from Arm, Right Updated:  01/03/18 2316     Sodium 139 mmol/L      Potassium 4 1 mmol/L      Chloride 104 mmol/L      CO2 29 mmol/L      Anion Gap 6 mmol/L      BUN 19 mg/dL      Creatinine 0 56 (L) mg/dL      Glucose 86 mg/dL      Calcium 8 9 mg/dL      AST 21 U/L      ALT 20 U/L      Alkaline Phosphatase 82 U/L Total Protein 6 8 g/dL      Albumin 3 5 g/dL      Total Bilirubin 0 50 mg/dL      eGFR 115 ml/min/1 73sq m     Narrative:         National Kidney Disease Education Program recommendations are as follows:  GFR calculation is accurate only with a steady state creatinine  Chronic Kidney disease less than 60 ml/min/1 73 sq  meters  Kidney failure less than 15 ml/min/1 73 sq  meters  Lipase [[id number]]  (Normal) Collected:  01/03/18 2234    Lab Status:  Final result Specimen:  Blood from Arm, Right Updated:  01/03/18 2316     Lipase 127 u/L     CBC and differential [[id number]]  (Normal) Collected:  01/03/18 2234    Lab Status:  Final result Specimen:  Blood from Arm, Right Updated:  01/03/18 2250     WBC 7 60 Thousand/uL      RBC 4 31 Million/uL      Hemoglobin 13 4 g/dL      Hematocrit 40 0 %      MCV 93 fL      MCH 31 1 pg      MCHC 33 5 g/dL      RDW 13 0 %      MPV 10 4 fL      Platelets 053 Thousands/uL      nRBC 0 /100 WBCs      Neutrophils Relative 67 %      Lymphocytes Relative 19 %      Monocytes Relative 11 %      Eosinophils Relative 2 %      Basophils Relative 1 %      Neutrophils Absolute 5 14 Thousands/µL      Lymphocytes Absolute 1 43 Thousands/µL      Monocytes Absolute 0 85 Thousand/µL      Eosinophils Absolute 0 12 Thousand/µL      Basophils Absolute 0 06 Thousands/µL                  US gallbladder   Final Result by Wayne Mobley MD (01/03 2308)      No cholelithiasis is seen   0 3 cm gallbladder polyp   No gallbladder wall thickening and no tenderness on sonographic palpation   Trace amount of pericholecystic fluid    CBD measures 7 mm and is minimally dilated, correlate with the biochemical evaluation      The study was marked in EPIC for immediate notification           Workstation performed: [id number]         CT abdomen pelvis with contrast    (Results Pending)              Procedures  Procedures       Phone Contacts  ED Phone Contact    ED Course  ED Course as of Jan 04 0204 Wed Jan 03, 2018 2213 Pt seen and examined  33 yo female presenting with upper abd discomfort and loss of appetite  Pain was mild yesterday and started mid day  Persisted today and worsened this evening at work  Loss of appetite all day, ate yogurt at work and had to "force it down"  Has some discomfort into mid upper back  No Barr's on exam but epigastric and RUQ tenderness  Will give IVF and check labs, urine and US GB  Hx of gastric bypass, tubal and appy  May need CT a//p     2250 WBC 7 6, pt getting [place] shows No cholelithiasis is seen 0 3 cm gallbladder polyp  No gallbladder wall thickening and no tenderness on sonographic palpation  Trace amount of pericholecystic fluid   CBD measures 7 mm and is minimally dilated, correlate with the biochemical evaluation  Will CT a/p as planned  Pt resting comfortably, still wants nothing for mild pain  2340 Pt drinking for CT a/p - will give dose of carafate  Thu Jan 04, 2018   0036 Pt being taken for CT a/p     0152 CT a/p - 1  Limited due to underdistention  Submucosal wall thickening/edema of the distal ileum and  ascending colon, possibly due to underdistention versus colitis  Clinical correlation recommended  Differential considerations include infectious, inflammatory (Crohn's disease or ulcerative colitis) or  less likely ischemic etiologies  2  Involuting right ovarian corpus luteal cyst   3  Scattered prominent right lower quadrant mesenteric lymph nodes, nonspecific but may be  reactive or seen in the setting of mesenteric adenitis  4  Hepatic steatosis  Pt states carafate helped with GI discomfort  Discussed gastritis vs PUD vs simply viral syndrome  Will f/u prn with PCP and surgeon                                  MDM  CritCare Time    Disposition  Final diagnoses:   Upper abdominal pain   Mesenteric adenitis   Viral syndrome     Time reflects when diagnosis was documented in both MDM as applicable and the Disposition within this note Time User Action Codes Description Comment    1/4/2018  1:55 AM Yamila PATEL Add [R10 10] Upper abdominal pain     1/4/2018  1:55 AM Yamila PATEL Add [I88 0] Mesenteric adenitis     1/4/2018  1:56 AM Yamila PATEL Add [B34 9] Viral syndrome       ED Disposition     ED Disposition Condition Comment    Discharge  [facility] discharge to home/self care  Condition at discharge: Good        Follow-up Information     Follow up With Specialties Details Why Sury Bingham MD Internal Medicine Call As needed [address]  [phone]          Discharge Medication List as of 1/4/2018  1:57 AM      START taking these medications    Details   sucralfate (CARAFATE) 1 g/10 mL suspension Take 10 mL by mouth 4 (four) times a day, Starting Thu 1/4/2018, Print         CONTINUE these medications which have NOT CHANGED    Details   Ascorbic Acid ([employer]S VITAMIN C) 500 MG CHEW Chew 1 tablet 2 (two) times a day, Historical Med      calcium carbonate-vitamin D (OSCAL-D) 500 mg-200 units per tablet Take 1 tablet by mouth 2 (two) times a day with meals, Historical Med      cyanocobalamin (VITAMIN B-12) 500 mcg tablet Take 500 mcg by mouth daily, Historical Med      ferrous sulfate (CVS IRON) 325 (65 Fe) mg tablet Take 325 mg by mouth 2 (two) times a day, Historical Med      multivitamin (THERAGRAN) TABS Take 1 tablet by mouth 2 (two) times a day, Historical Med           No discharge procedures on file      ED Provider  Electronically Signed by           Mariah Mccoy DO  01/04/18 4309

## 2018-01-04 NOTE — ED NOTES
Pt transported to [address] [place] [address] [address]       Dani Mota RN  01/03/18 3653

## 2018-01-04 NOTE — DISCHARGE INSTRUCTIONS
Mesenteric Adenitis   WHAT YOU NEED TO KNOW:   Mesenteric adenitis is inflammation of lymph nodes in the tissue that surrounds your intestines  Lymph nodes are organs of the immune system that help absorb bacteria and toxins from your body  It is caused by infection from bacteria, viruses, or parasites  Mesenteric adenitis may cause dehydration and loss of electrolytes (minerals), such as sodium  Rarely, it could lead to sepsis (a serious blood infection) or an abscess (pus-filled wound) on your intestine  DISCHARGE INSTRUCTIONS:   Follow up with your healthcare provider as directed:  Write down your questions so you remember to ask them during your visits  Prevent mesenteric adenitis:   · Wash your hands  Use soap and water  Wash your hands after you use the bathroom, change a child's diapers, or sneeze  Wash your hands before you prepare or eat food  · Cook meats all the way through  Use a meat thermometer to make sure meat is heated to a temperature that will kill bacteria  Do not eat raw or undercooked chicken, turkey, seafood, beef, or pork  · Drink safe water  Drink only treated water  Do not drink water from ponds or lakes  · Drink safe milk  Drink only pasteurized milk  Do not drink raw milk  Contact your healthcare provider if:   · Your symptoms return  · You have questions or concerns about your condition or care  Seek care immediately if:   · You pass very little urine  · You cannot pass a bowel movement or gas  · You are extremely thirsty  · You become pale, exhausted, or sweaty without effort  · You have swelling in your abdomen  © 2017 [employer] Information is for End User's use only and may not be sold, redistributed or otherwise used for commercial purposes  All illustrations and images included in CareNotes® are the copyrighted property of [employer]  or [employer]  The above information is an  only   It is not intended as medical advice for individual conditions or treatments  Talk to your doctor, nurse or pharmacist before following any medical regimen to see if it is safe and effective for you  Viral Syndrome   WHAT YOU NEED TO KNOW:   Viral syndrome is a term used for a viral infection that has no clear cause  Viruses are spread easily from person to person through the air and on shared items  DISCHARGE INSTRUCTIONS:   Call 911 for the following:   · You have a seizure  · You cannot be woken  · You have chest pain or trouble breathing  Seek care immediately if:   · You have a stiff neck, a bad headache, and sensitivity to light  · You feel weak, dizzy, or confused  · You stop urinating or urinate a lot less than normal      · You cough up blood or thick, yellow or green, mucus  · You have severe abdominal pain or your abdomen is larger than usual   Contact your healthcare provider if:   · Your symptoms do not get better with treatment, or get worse, after 3 days  · You have a rash or ear pain  · You have burning when you urinate  · You have questions or concerns about your condition or care  Medicines: You may  need any of the following:  · Acetaminophen  decreases pain and fever  It is available without a doctor's order  Ask how much medicine to take and how often to take it  Follow directions  Acetaminophen can cause liver damage if not taken correctly  · NSAIDs , such as ibuprofen, help decrease swelling, pain, and fever  NSAIDs can cause stomach bleeding or kidney problems in certain people  If you take blood thinner medicine, always ask your healthcare provider if NSAIDs are safe for you  Always read the medicine label and follow directions  · Cold medicine  helps decrease swelling, control a cough, and relieve chest or nasal congestion  · Saline nasal spray  helps decrease nasal congestion  · Take your medicine as directed    Contact your healthcare provider if you think your medicine is not helping or if you have side effects  Tell him of her if you are allergic to any medicine  Keep a list of the medicines, vitamins, and herbs you take  Include the amounts, and when and why you take them  Bring the list or the pill bottles to follow-up visits  Carry your medicine list with you in case of an emergency  Manage your symptoms:   · Drink liquids as directed  to prevent dehydration  Ask how much liquid to drink each day and which liquids are best for you  Ask if you should drink an oral rehydration solution (ORS)  An ORS has the right amounts of water, salts, and sugar you need to replace body fluids  This may help prevent dehydration caused by vomiting or diarrhea  Do not drink liquids with caffeine  Drinks with caffeine can make dehydration worse  · Get plenty of rest  to help your body heal  Take naps throughout the day  Ask your healthcare provider when you can return to work and your normal activities  · Use a cool mist humidifier  to help you breathe easier if you have nasal or chest congestion  Ask your healthcare provider how to use a cool mist humidifier  · Eat honey or use cough drops  to help decrease throat discomfort  Ask your healthcare provider how much honey you should eat each day  Cough drops are available without a doctor's order  Follow directions for taking cough drops  · Do not smoke and stay away from others who smoke  Nicotine and other chemicals in cigarettes and cigars can cause lung damage  Smoking can also delay healing  Ask your healthcare provider for information if you currently smoke and need help to quit  E-cigarettes or smokeless tobacco still contain nicotine  Talk to your healthcare provider before you use these products  · Wash your hands frequently  to prevent the spread of germs to others  Use soap and water  Use gel hand  when soap and water are not available   Wash your hands after you use the bathroom, cough, or sneeze  Wash your hands before you prepare or eat food  Follow up with your healthcare provider as directed:  Write down your questions so you remember to ask them during your visits  © 2017 [employer]  Information is for End User's use only and may not be sold, redistributed or otherwise used for commercial purposes  All illustrations and images included in CareNotes® are the copyrighted property of [employer]  or [employer]ils Palmer  The above information is an  only  It is not intended as medical advice for individual conditions or treatments  Talk to your doctor, nurse or pharmacist before following any medical regimen to see if it is safe and effective for you  Epigastric Pain   WHAT YOU NEED TO KNOW:   Epigastric pain is felt in the middle of the upper abdomen, between the ribs and the bellybutton  The pain may be mild or severe  Pain may spread from or to another part of your body  Epigastric pain may be a sign of a serious health problem that needs to be treated  DISCHARGE INSTRUCTIONS:   Call 911 for any of the following:   · You have any of the following signs of a heart attack:      ¨ Squeezing, pressure, or pain in your chest that lasts longer than 5 minutes or returns    ¨ Discomfort or pain in your back, neck, jaw, stomach, or arm     ¨ Trouble breathing    ¨ Nausea or vomiting    ¨ Lightheadedness or a sudden cold sweat, especially with chest pain or trouble breathing    · You have severe pain that radiates to your jaw or back  Return to the emergency department if:   · You have severe pain that starts suddenly and quickly gets worse  · You cannot have a bowel movement and are vomiting  · You vomit or cough up blood  · You see blood in your urine or bowel movement  · You feel drowsy and your breathing is slower than usual   Contact your healthcare provider if:   · You have a fever or chills      · You have yellowing of your skin or the whites of your eyes  · You vomit often or several times in a row  · You lose weight without trying  · You have symptoms for longer than 2 weeks  · You have questions or concerns about your condition or care  Medicines:   · Medicines  may be given to treat pain or stop vomiting  You may also need medicines to reduce or control stomach acid, or treat an infection  · Take your medicine as directed  Contact your healthcare provider if you think your medicine is not helping or if you have side effects  Tell him of her if you are allergic to any medicine  Keep a list of the medicines, vitamins, and herbs you take  Include the amounts, and when and why you take them  Bring the list or the pill bottles to follow-up visits  Carry your medicine list with you in case of an emergency  Follow up with your healthcare provider as directed:  Write down your questions so you remember to ask them during your visits  Manage your symptoms:   · Keep a record of your symptoms  Include when the pain starts, how long it lasts, and if it is sharp or dull  Also include any foods you ate or activities you did before the pain started  Keep track of anything that helped the pain  · Eat a variety of healthy foods  Healthy foods include fruits, vegetables, whole-grain breads, low-fat dairy products, beans, lean meats, and fish  Ask if you need to be on a special diet  Certain foods may cause your pain, such as alcohol or foods that are high in fat  You may need to eat smaller meals and to eat more often than usual     · Drink liquids as directed  Ask how much liquid to drink each day and which liquids are best for you  Do not have drinks that contain alcohol or caffeine  © 2017 [facility] Information is for End User's use only and may not be sold, redistributed or otherwise used for commercial purposes   All illustrations and images included in CareNotes® are the copyrighted property of [employer]  or Nils Palmer  The above information is an  only  It is not intended as medical advice for individual conditions or treatments  Talk to your doctor, nurse or pharmacist before following any medical regimen to see if it is safe and effective for you

## 2018-01-06 NOTE — PROGRESS NOTES
Assessment   1  Abdominal pain (039 00) (R10 9)    Discussion/Summary   Discussion Summary:    Today you were found to have moderate abdominal pain on exam  With you history of gastric bypass I feel that you need more of a work-up than what can be provided here  Therefore I recommend you go to the ER for further evaluation and management  Understands and agrees with treatment plan: The treatment plan was reviewed with the patient/guardian  The patient/guardian understands and agrees with the treatment plan      Chief Complaint   1  Abdominal Pain  Chief Complaint Free Text Note Form: Pt presents with 5/10 b/l lower quadrant pain  Pain began earlier today  Pain radiating to mid back  History of Present Illness   HPI: Patient presents today for evaluation of abdominal pain that started this afternoon  Patient states that her pain is in her right upper quadrant and her right lower quadrant  She also has pain in between her shoulder blades  She states that she is concerned about her galbladder  Patient rates her pain as a 5/10  She also admits to intermittent chills  Patient has a history of gastric bypass surgery in 2013  Hospital Based Practices Required Assessment:      Pain Assessment      the patient states they have pain  The pain is located in the b/l lower quadrants of abdomen  (on a scale of 0 to 10, the patient rates the pain at 5 )      Abuse And Domestic Violence Screen       Yes, the patient is safe at home  -- The patient states no one is hurting them  Depression And Suicide Screen  No, the patient has not had thoughts of hurting themself  Prefered Language is  [place]  Primary Language is  English  Review of Systems   Focused-Female:      Constitutional: chills, but-- no fever  Cardiovascular: no chest pain  Respiratory: no shortness of breath  Gastrointestinal: abdominal pain, but-- no nausea,-- no vomiting-- and-- no diarrhea        Genitourinary: no dysuria  ROS Reviewed:    ROS reviewed  Active Problems   1  Abnormal CT of the chest (793 2) (R93 8)   2  Acute URI (465 9) (J06 9)   3  Anemia, iron deficiency (280 9) (D50 9)   4  Anterior pleuritic pain (786 52) (R07 81)   5  Back pain (724 5) (M54 9)   6  Bloating (787 3) (R14 0)   7  Bulla, lung (492 0) (J43 9)   8  Coccydynia (724 79) (M53 3)   9  Counseling for travel (V65 49) (Z71 89)   10  Dehydration (276 51) (E86 0)   11  Encounter for cosmetic surgery (V50 1) (Z41 1)   12  History of allergy (V15 09) (Z88 9)   13  Hypocalcemia (275 41) (E83 51)   14  Iron deficiency (280 9) (E61 1)   15  Kyphosis deformity of spine (737 10) (M40 209)   16  Loss of height (781 91) (R29 890)   17  Menopause (627 2) (Z78 0)   18  Menorrhagia (626 2) (N92 0)   19  Denied: History of Mental disorder   20  Metrorrhagia (626 6) (N92 1)   21  Neoplasm of skin (239 2) (D49 2)   22  Obesity (278 00) (E66 9)   23  Postgastrectomy malabsorption (579 3) (K91 2,Z90 3)   24  Primary osteoarthritis of right foot (715 17) (M19 071)   25  Right-sided chest pain (786 50) (R07 9)   26  Status post gastric bypass for obesity (V45 86) (Z98 84)   27  Vitamin B deficiency (266 9) (E53 9)   28  Vitamin B12 deficiency (266 2) (E53 8)   29  Vitamin D deficiency (268 9) (E55 9)    Past Medical History   1  History of Abdominal pain, epigastric (789 06) (R10 13)   2  History of Abdominal pain, RUQ (789 01) (R10 11)   3  History of Acute otitis media, unspecified laterality   4  History of Acute pain of left knee (719 46) (M25 562)   5  History of Acute right hip pain (719 45) (M25 551)   6  History of Benign essential hypertension (401 1) (I10)   7  Depression screen (V79 0) (Z13 89)   8  History of Dysphagia (787 20) (R13 10)   9  History of Encounter for Papanicolaou smear of cervix (V76 2) (Z12 4)   10  History of hematuria (V13 09) (Z87 448)   11  History of migraine (V12 49) (Z86 69)   12   History of rectal bleeding (V12 79) (Z87 19) 13  History of upper respiratory infection (V12 09) (Z87 09)   14  History of upper respiratory infection (V12 09) (Z87 09)   15  Denied: History of Mental disorder   16  History of Morbid or severe obesity due to excess calories (278 01) (E66 01)   17  History of Pain in eye, unspecified laterality (379 91) (H57 10)   18  History of Right foot pain (729 5) (M79 671)   19  History of Summary Of Previous Pregnancies  1  (Total No )   20  History of Summary Of Previous Pregnancies Para 1  (Deliveries)   21  History of URI, acute (465 9) (J06 9)   22  History of Urinary tract infection (599 0) (N39 0)   23  History of Vitamin A deficiency (264 9) (E50 9)  Active Problems And Past Medical History Reviewed: The active problems and past medical history were reviewed and updated today  Family History   Mother    1  Denied: Family history of Anxiety and depression   2  Family history of Atrial Fibrillation   3  Denied: Family history of Drug abuse   4  Family history of malignant melanoma (V16 8) (Z80 8)   5  History of thyroidectomy   6  Family history of Hypertension (V17 49)   7  No family history of mental disorder  Father    8  Denied: Family history of Anxiety and depression   9  Family history of Diabetes Mellitus (V18 0)   10  Denied: Family history of Drug abuse   11  Family history of Hypertension (V17 49)   12  No family history of mental disorder   15  Family history of Obesity  Maternal Aunt    14  Family history of malignant neoplasm of stomach (V16 0) (Z80 0)   15  Family history of GIST, malignant  Paternal Aunt    12  Family history of malignant neoplasm of stomach (V16 0) (Z80 0)  Family History    17  Family history of Allergies   18  Family history of Cancer   23  Family history of Diabetes Mellitus (V18 0)   20  Family history of Easy Bleeding   21  Family history of Graves' Disease   22  Family history of Heart Disease (V17 49)   23  Family history of Hypertension (V17 49)   24   Family history of Obesity   25  Family history of Reported Cholesterol Level Was High   26  Family history of Stroke Syndrome (V17 1)  Family History Reviewed: The family history was reviewed and updated today  Social History    · Denied: History of Alcohol Use (History)   · Being A Social Drinker   · Denied: History of Drug Use   · Marital History - Currently    · Never A Smoker   · [employer]ism  Social History Reviewed: The social history was reviewed and updated today  The social history was reviewed and is unchanged  Surgical History   1  History of Appendectomy   2  History of A-V Malformation Repair Dural   3  History of  Section   4  History of Gastric Surgery For Morbid Obesity Gastric Bypass   5  History of Oral Surgery Tooth Extraction   6  History of Right Breast Lumpectomy   7  History of Tonsillectomy With Adenoidectomy   8  History of Tubal Ligation  Surgical History Reviewed: The surgical history was reviewed and updated today  Current Meds    1  Azithromycin 250 MG Oral Tablet; TAKE AS DIRECTED PER PACKAGE INSTRUCTIONS; Therapy: 55CQO2210 to (Last Rx:[id number])  Requested for: 2017 Ordered   2  Calcium 500 +D 500-400 MG-UNIT Oral Tablet; Take 1 tablet twice daily; Therapy: 2016 to (Yuri Kidd)  Requested for: 26FCT7614; Last     Rx:[id number] Ordered   3  Centrum Ultra Womens TABS; TAKE 1 TABLET EVERY 12 HOURS; Therapy: (TTTDWQVW:[id number]) to Recorded   4  Cyclobenzaprine HCl - 10 MG Oral Tablet; TAKE 1 TABLET 3 TIMES DAILY; Therapy: 06UEQ2305 to [phone])  Requested for: 2017; Last     Rx:[id number] Ordered   5  Diclofenac Sodium 1 % Transdermal Gel; APPLY SPARINGLY TO AFFECTED AREA(S)     ONCE DAILY; Therapy: 17SCO5571 to (Last Rx:2017)  Requested for: 2017 Ordered   6  Fluticasone Propionate 50 MCG/ACT Nasal Suspension; USE 2 SPRAYS IN EACH     NOSTRIL ONCE DAILY;      Therapy: 02GFH0263 to (Last HW:70RSL2424)  Requested for: 83WKJ1562 Ordered   7  Iron 65 MG TABS; twice daily; Therapy: (BHBNUOET:64CEA4473) to Recorded   8  Promethazine-DM 6 25-15 MG/5ML Oral Syrup; TAKE 5 ML EVERY 4 TO 6 HOURS AS     NEEDED FOR COUGH; Therapy: 72TQV7402 to (72 240 26 09)  Requested for: 84Ifc9405; Last     Rx:68Zug1426 Ordered   9  TraMADol HCl - 50 MG Oral Tablet; take 1 tablet every 8 hours as needed for pain; Therapy: 95THL1974 to (Last Rx:33Lyb7382) Ordered   10  Vitamin B12 100 MCG Oral Tablet; twice daily; Therapy: (RRRHJONS:59QHS0006) to Recorded   11  Vitamin C Plus 1000 MG Oral Tablet; twice daily; Therapy: ([id number]) to Recorded   12  Vitamin D3 5000 UNIT Oral Capsule; TAKE 1 CAPSULE BY MOUTH EVERY DAY; Therapy: 40Ezh5966 to (Elease Neither)  Requested for: 47CLL3483; Last      Rx:60Uhn6306 Ordered  Medication List Reviewed: The medication list was reviewed and updated today  Allergies   1  No Known Drug Allergies  2  Seasonal    Vitals   Signs   Recorded: 87KEH2903 09:22PM   Temperature: 97 1 F, Tympanic  Heart Rate: 84  Respiration: 16  Blood Pressure: 120 mm Hg  Blood Pressure: 80 mm Hg  Height: 5 ft 6 in  Weight: 150 lb   BMI Calculated: 24 21 kg/m2  BSA Calculated: 1 77 m2  O2 Saturation: 96  Pain Scale: 5    Physical Exam        Constitutional      General appearance: No acute distress, well appearing and well nourished  Pulmonary      Respiratory effort: No increased work of breathing or signs of respiratory distress  Auscultation of lungs: Clear to auscultation  Cardiovascular      Auscultation of heart: Normal rate and rhythm, normal S1 and S2, without murmurs  Abdomen      Abdomen: Abnormal   Bowel sounds were normal  There was moderate tenderness in the right upper quadrant-- and-- in the right lower quadrant  The abdomen was not rigid  No rebound tenderness        Musculoskeletal      Gait and station: Normal        Skin Skin and subcutaneous tissue: Normal without rashes or lesions         Signatures    Electronically signed by : YULIANA Ford; Jared  3 2018  9:36PM EST                       (Author)     Electronically signed by : AMANDA Chacon ; Jan 5 2018  1:18PM EST                       (Co-author)

## 2018-01-09 NOTE — PROGRESS NOTES
History of Present Illness  Care Coordination Encounter Information:   Type of Encounter: Telephonic   Contact: Initial Contact    Spoke to Patient  Care Coordination SL Nurse [facility]:   The reason for call is to discuss outreach for follow up/needed services  Made outreach as per Annika Johnson  Pt sounded scared, weepy and frustrated on the phone  She has an appt on Friday the 23rd with Pulmonary for results of a recent CT Scan  I will f/u with her next week and she was pleased for the outreach  Active Problems    1  Acute pain of left knee (719 46) (M25 562)   2  Acute right hip pain (719 45) (M25 551)   3  Anemia, iron deficiency (280 9) (D50 9)   4  Anterior pleuritic pain (786 52) (R07 81)   5  Back pain (724 5) (M54 9)   6  Bloating (787 3) (R14 0)   7  Coccydynia (724 79) (M53 3)   8  Counseling for travel (V65 49) (Z71 89)   9  Dehydration (276 51) (E86 0)   10  Encounter for cosmetic surgery (V50 1) (Z41 1)   11  Encounter for Papanicolaou smear of cervix (V76 2) (Z12 4)   12  Encounter for routine gynecological examination with Papanicolaou smear of cervix    (V72 31,V76 2) (Z01 419)   13  Hematuria (599 70) (R31 9)   14  History of allergy (V15 09) (Z88 9)   15  Hypocalcemia (275 41) (E83 51)   16  Iron deficiency (280 9) (E61 1)   17  Menorrhagia (626 2) (N92 0)   18  Denied: History of Mental disorder   23  Metrorrhagia (626 6) (N92 1)   20  Neoplasm of skin (239 2) (D49 2)   21  Obesity (278 00) (E66 9)   22  Postgastrectomy malabsorption (579 3) (K91 2,Z90 3)   23  Pre-operative cardiovascular examination (V72 81) (Z01 810)   24  Primary osteoarthritis of right foot (715 17) (M19 071)   25  Rectal bleeding (569 3) (K62 5)   26  Right foot pain (729 5) (M79 671)   27  Right-sided chest pain (786 50) (R07 9)   28  Status post gastric bypass for obesity (V45 86) (Z98 84)   29  Upper respiratory infection (465 9) (J06 9)   30  URI, acute (465 9) (J06 9)   31   Visit for screening mammogram (V76 12) (Z12 31)   32  Vitamin B deficiency (266 9) (E53 9)   33  Vitamin B12 deficiency (266 2) (E53 8)   34  Vitamin D deficiency (268 9) (E55 9)    Past Medical History    1  History of Abdominal pain, epigastric (789 06) (R10 13)   2  History of Abdominal pain, RUQ (789 01) (R10 11)   3  History of Acute otitis media, unspecified laterality   4  History of Benign essential hypertension (401 1) (I10)   5  Depression screen (V79 0) (Z13 89)   6  History of Dysphagia (787 20) (R13 10)   7  History of migraine (V12 49) (Z86 69)   8  History of upper respiratory infection (V12 09) (Z87 09)   9  Denied: History of Mental disorder   10  History of Morbid or severe obesity due to excess calories (278 01) (E66 01)   11  History of Pain in eye, unspecified laterality (379 91) (H57 10)   12  History of Summary Of Previous Pregnancies  1  (Total No )   13  History of Summary Of Previous Pregnancies Para 1  (Deliveries)   14  History of Urinary tract infection (599 0) (N39 0)   15  History of Vitamin A deficiency (264 9) (E50 9)    Surgical History    1  History of Appendectomy   2  History of A-V Malformation Repair Dural   3  History of  Section   4  History of Gastric Surgery For Morbid Obesity Gastric Bypass   5  History of Oral Surgery Tooth Extraction   6  History of Right Breast Lumpectomy   7  History of Tonsillectomy With Adenoidectomy   8  History of Tubal Ligation    Family History  Mother    1  Denied: Family history of Anxiety and depression   2  Family history of Atrial Fibrillation   3  Denied: Family history of Drug abuse   4  History of thyroidectomy   5  Family history of Hypertension (V17 49)   6  No family history of mental disorder  Father    7  Denied: Family history of Anxiety and depression   8  Family history of Diabetes Mellitus (V18 0)   9  Denied: Family history of Drug abuse   10  Family history of Hypertension (V17 49)   11  No family history of mental disorder   12   Family history of Obesity  Maternal Aunt    13  Family history of malignant neoplasm of stomach (V16 0) (Z80 0)   14  Family history of GIST, malignant  Paternal Aunt    13  Family history of malignant neoplasm of stomach (V16 0) (Z80 0)  Family History    16  Family history of Allergies   17  Family history of Cancer   18  Family history of Diabetes Mellitus (V18 0)   19  Family history of Easy Bleeding   20  Family history of Graves' Disease   21  Family history of Heart Disease (V17 49)   22  Family history of Hypertension (V17 49)   23  Family history of Obesity   24  Family history of Reported Cholesterol Level Was High   25  Family history of Stroke Syndrome (V17 1)    Social History    · Denied: History of Alcohol Use (History)   · Being A Social Drinker   · Denied: History of Drug Use   · Marital History - Currently    · Never A Smoker   · Yarsanism    Current Meds    1  TraMADol HCl - 50 MG Oral Tablet; take 1 tablet every 8 hours as needed for pain; Therapy: 33FYY5147 to (Last Rx:19Oct2016) Ordered    2  Calcium 500 +D 500-400 MG-UNIT Oral Tablet; Take 1 tablet twice daily; Therapy: 08Apr2016 to (Derek Flatness)  Requested for: 08Apr2016; Last   Rx:08Apr2016 Ordered    3  Diclofenac Sodium 1 % Transdermal Gel (Voltaren); APPLY SPARINGLY TO AFFECTED   AREA(S) ONCE DAILY; Therapy: 95LVT1071 to (Last Rx:06Jun2017)  Requested for: 06Jun2017 Ordered    4  Fluticasone Propionate 50 MCG/ACT Nasal Suspension; USE 2 SPRAYS IN EACH   NOSTRIL ONCE DAILY; Therapy: 46VOC6491 to (Last MD:98YIO0669)  Requested for: 54TFS7705 Ordered    5  Vitamin D3 5000 UNIT Oral Capsule; TAKE 1 CAPSULE BY MOUTH EVERY DAY; Therapy: 08Apr2016 to (Derek Flatness)  Requested for: 08Apr2016; Last   Rx:08Apr2016 Ordered    6  Centrum Ultra Womens TABS; Therapy: (Recorded:16Jun2017) to Recorded   7  Iron 65 MG TABS; Therapy: (Recorded:16Jun2017) to Recorded   8  Vitamin B12 100 MCG Oral Tablet;    Therapy: (Recorded:12Jun2017) to Recorded   9  Vitamin C Plus 1000 MG Oral Tablet; Therapy: (Recorded:16Jun2017) to Recorded    Allergies    1  No Known Drug Allergies    2  Seasonal    End of Encounter Meds    1  TraMADol HCl - 50 MG Oral Tablet; take 1 tablet every 8 hours as needed for pain; Therapy: 22AEM7606 to (Last Rx:19Oct2016) Ordered    2  Calcium 500 +D 500-400 MG-UNIT Oral Tablet; Take 1 tablet twice daily; Therapy: 38Ycw7861 to ([employer])  Requested for: 08Apr2016; Last   Rx:08Apr2016 Ordered    3  Diclofenac Sodium 1 % Transdermal Gel (Voltaren); APPLY SPARINGLY TO AFFECTED   AREA(S) ONCE DAILY; Therapy: 33XLD0525 to (Last Rx:06Jun2017)  Requested for: 06Jun2017 Ordered    4  Fluticasone Propionate 50 MCG/ACT Nasal Suspension; USE 2 SPRAYS IN EACH   NOSTRIL ONCE DAILY; Therapy: 48BWQ4434 to (Last PQ:53PKF1716)  Requested for: 99MIH4300 Ordered    5  Vitamin D3 5000 UNIT Oral Capsule; TAKE 1 CAPSULE BY MOUTH EVERY DAY; Therapy: 08Apr2016 to ([employer])  Requested for: 08Apr2016; Last   Rx:08Apr2016 Ordered    6  Centrum Ultra Womens TABS; Therapy: (Recorded:16Jun2017) to Recorded   7  Iron 65 MG TABS; Therapy: (Recorded:16Jun2017) to Recorded   8  Vitamin B12 100 MCG Oral Tablet; Therapy: (Recorded:12Jun2017) to Recorded   9  Vitamin C Plus 1000 MG Oral Tablet; Therapy: (Recorded:16Jun2017) to Recorded    Future Appointments    Date/Time Provider Specialty Site   07/07/2017 04:00 PM Eros Angulo MD Internal Medicine [facility]   09/15/2017 03:40 PM AMANDA Cardoza  Hematology Oncology [facility]   06/23/2017 09:30 AM AMANDA Vang   Pulmonary Medicine [facility] [place]   11/13/2017 10:00 AM Radha Edmondson [address]     Patient Care Team    Care Team Member Role Specialty Office Number   Lilliana Hammond MD  Family Medicine [phone]   [address] Family Medicine [phone]   Phyllis Billings, [address] [facility] Specialist Cardiology [phone]   [place] [address] Surgery [phone]   Kenrick Horvath DO Specialist Obstetrics/Gynecology [phone]   Nora CARRASCO   Specialist Plastic Surgery [phone]   [facility]  Family Medicine [phone]   Maxime Bettencourt [facility]  Family Medicine [phone] [address] [facility], Podiatry Specialist  [phone], Amy Elder   [phone]     Signatures   Electronically signed by : Dannie Gray RN; Jun 21 2017 11:02AM EST                       (Author)

## 2018-01-11 NOTE — PROGRESS NOTES
History of Present Illness  Care Coordination Encounter Information:   Type of Encounter: Telephonic   Contact: Follow-Up    Spoke to Patient  Care Coordination SL Nurse [facility]:   The reason for call is to discuss outreach for follow up/needed services  Pt states that she was feeling better with the iron infusions and now she has gotten her period which is very heavy  Now she has possible Gyn issues and she is trying to get her pulmonary issues straightened out first  She is awaiting results of her CT scan that she recently had  Will follow up with her on Monday as per her request      Active Problems    1  Abnormal CT of the chest (793 2) (R93 8)   2  Anemia, iron deficiency (280 9) (D50 9)   3  Anterior pleuritic pain (786 52) (R07 81)   4  Back pain (724 5) (M54 9)   5  Bloating (787 3) (R14 0)   6  Bulla, lung (492 0) (J43 9)   7  Coccydynia (724 79) (M53 3)   8  Counseling for travel (V65 49) (Z71 89)   9  Dehydration (276 51) (E86 0)   10  Encounter for cosmetic surgery (V50 1) (Z41 1)   11  History of allergy (V15 09) (Z88 9)   12  Hypocalcemia (275 41) (E83 51)   13  Iron deficiency (280 9) (E61 1)   14  Kyphosis deformity of spine (737 10) (M40 209)   15  Loss of height (781 91) (R29 890)   16  Menopause (627 2) (Z78 0)   17  Menorrhagia (626 2) (N92 0)   18  Denied: History of Mental disorder   23  Metrorrhagia (626 6) (N92 1)   20  Neoplasm of skin (239 2) (D49 2)   21  Obesity (278 00) (E66 9)   22  Postgastrectomy malabsorption (579 3) (K91 2,Z90 3)   23  Primary osteoarthritis of right foot (715 17) (M19 071)   24  Right-sided chest pain (786 50) (R07 9)   25  Status post gastric bypass for obesity (V45 86) (Z98 84)   26  Vitamin B deficiency (266 9) (E53 9)   27  Vitamin B12 deficiency (266 2) (E53 8)   28  Vitamin D deficiency (268 9) (E55 9)    Past Medical History    1  History of Abdominal pain, epigastric (789 06) (R10 13)   2  History of Abdominal pain, RUQ (789 01) (R10 11)   3   History of Acute otitis media, unspecified laterality   4  History of Acute pain of left knee (719 46) (M25 562)   5  History of Acute right hip pain (719 45) (M25 551)   6  History of Benign essential hypertension (401 1) (I10)   7  Depression screen (V79 0) (Z13 89)   8  History of Dysphagia (787 20) (R13 10)   9  History of Encounter for Papanicolaou smear of cervix (V76 2) (Z12 4)   10  History of hematuria (V13 09) (Z87 448)   11  History of migraine (V12 49) (Z86 69)   12  History of rectal bleeding (V12 79) (Z87 19)   13  History of upper respiratory infection (V12 09) (Z87 09)   14  History of upper respiratory infection (V12 09) (Z87 09)   15  Denied: History of Mental disorder   16  History of Morbid or severe obesity due to excess calories (278 01) (E66 01)   17  History of Pain in eye, unspecified laterality (379 91) (H57 10)   18  History of Right foot pain (729 5) (M79 671)   19  History of Summary Of Previous Pregnancies  1  (Total No )   20  History of Summary Of Previous Pregnancies Para 1  (Deliveries)   21  History of URI, acute (465 9) (J06 9)   22  History of Urinary tract infection (599 0) (N39 0)   23  History of Vitamin A deficiency (264 9) (E50 9)    Surgical History    1  History of Appendectomy   2  History of A-V Malformation Repair Dural   3  History of  Section   4  History of Gastric Surgery For Morbid Obesity Gastric Bypass   5  History of Oral Surgery Tooth Extraction   6  History of Right Breast Lumpectomy   7  History of Tonsillectomy With Adenoidectomy   8  History of Tubal Ligation    Family History  Mother    1  Denied: Family history of Anxiety and depression   2  Family history of Atrial Fibrillation   3  Denied: Family history of Drug abuse   4  Family history of malignant melanoma (V16 8) (Z80 8)   5  History of thyroidectomy   6  Family history of Hypertension (V17 49)   7  No family history of mental disorder  Father    8   Denied: Family history of Anxiety and depression   9  Family history of Diabetes Mellitus (V18 0)   10  Denied: Family history of Drug abuse   11  Family history of Hypertension (V17 49)   12  No family history of mental disorder   15  Family history of Obesity  Maternal Aunt    14  Family history of malignant neoplasm of stomach (V16 0) (Z80 0)   15  Family history of GIST, malignant  Paternal Aunt    12  Family history of malignant neoplasm of stomach (V16 0) (Z80 0)  Family History    17  Family history of Allergies   18  Family history of Cancer   23  Family history of Diabetes Mellitus (V18 0)   20  Family history of Easy Bleeding   21  Family history of Graves' Disease   22  Family history of Heart Disease (V17 49)   23  Family history of Hypertension (V17 49)   24  Family history of Obesity   25  Family history of Reported Cholesterol Level Was High   26  Family history of Stroke Syndrome (V17 1)    Social History    · Denied: History of Alcohol Use (History)   · Being A Social Drinker   · Denied: History of Drug Use   · Marital History - Currently    · Never A Smoker   · Hinduism    Current Meds    1  DiphenhydrAMINE HCl 50 MG/ML Injection Solution; USE AS DIRECTED; Therapy: 39NWC5306 to Recorded   2  Venofer 20 MG/ML Intravenous Solution; USE AS DIRECTED; Therapy: 98JDJ8286 to Recorded    3  Calcium 500 +D 500-400 MG-UNIT Oral Tablet; Take 1 tablet twice daily; Therapy: 08Apr2016 to [phone])  Requested for: 44NJV6769; Last   Rx:58Msx1004 Ordered    4  TraMADol HCl - 50 MG Oral Tablet; take 1 tablet every 8 hours as needed for pain; Therapy: 52ZWB3158 to (Last Rx:19Oct2016) Ordered    5  Fluticasone Propionate 50 MCG/ACT Nasal Suspension; USE 2 SPRAYS IN EACH   NOSTRIL ONCE DAILY; Therapy: 83LNM6808 to (Last JI:67OXA3722)  Requested for: 00GJC5401 Ordered    6  Diclofenac Sodium 1 % Transdermal Gel (Voltaren); APPLY SPARINGLY TO AFFECTED   AREA(S) ONCE DAILY;    Therapy: 14PDA4463 to (Last Rx:06Jun2017)  Requested for: 34ZWW4162 Ordered    7  Vitamin D3 5000 UNIT Oral Capsule; TAKE 1 CAPSULE BY MOUTH EVERY DAY; Therapy: 08Apr2016 to (Maret Darin)  Requested for: 98LKS2228; Last   Rx:49Utm7936 Ordered    8  Centrum Ultra Womens TABS; TAKE 1 TABLET EVERY 12 HOURS; Therapy: (LZOTYWFE:12SDQ6826) to Recorded   9  Iron 65 MG TABS; twice daily; Therapy: (KONWUCDU:84YVN6273) to Recorded   10  Vitamin B12 100 MCG Oral Tablet; twice daily; Therapy: (VFDBPJMP:77UAP8438) to Recorded   11  Vitamin C Plus 1000 MG Oral Tablet; twice daily; Therapy: (Recorded:23Jun2017) to Recorded    Allergies    1  No Known Drug Allergies    2  Seasonal    End of Encounter Meds    1  DiphenhydrAMINE HCl 50 MG/ML Injection Solution; USE AS DIRECTED; Therapy: 67VZO0295 to Recorded   2  Venofer 20 MG/ML Intravenous Solution; USE AS DIRECTED; Therapy: 40XRH2476 to Recorded    3  Calcium 500 +D 500-400 MG-UNIT Oral Tablet; Take 1 tablet twice daily; Therapy: 08Apr2016 to (Triny Northfield)  Requested for: 70XLJ7431; Last   Rx:15Dkr1690 Ordered    4  TraMADol HCl - 50 MG Oral Tablet; take 1 tablet every 8 hours as needed for pain; Therapy: 90ENR9168 to (Last Rx:19Oct2016) Ordered    5  Fluticasone Propionate 50 MCG/ACT Nasal Suspension; USE 2 SPRAYS IN EACH   NOSTRIL ONCE DAILY; Therapy: 83QBE4438 to (Last NJ:60RQQ2477)  Requested for: 05LMC2261 Ordered    6  Diclofenac Sodium 1 % Transdermal Gel (Voltaren); APPLY SPARINGLY TO AFFECTED   AREA(S) ONCE DAILY; Therapy: 34NLQ1758 to (Last Rx:06Jun2017)  Requested for: 06Jun2017 Ordered    7  Vitamin D3 5000 UNIT Oral Capsule; TAKE 1 CAPSULE BY MOUTH EVERY DAY; Therapy: 08Apr2016 to (Maret Northfield)  Requested for: 57ZJC4798; Last   Rx:92Tgg6098 Ordered    8  Centrum Ultra Womens TABS; TAKE 1 TABLET EVERY 12 HOURS; Therapy: (LCFAKSVF:67DTJ7600) to Recorded   9  Iron 65 MG TABS; twice daily; Therapy: (HMFPLBLM:01OTD6894) to Recorded   10   Vitamin B12 100 MCG Oral Tablet; twice daily; Therapy: (ILFBGKUM:25GCW1960) to Recorded   11  Vitamin C Plus 1000 MG Oral Tablet; twice daily; Therapy: (WTCWYYTK:85CYL6793) to Recorded    Future Appointments    Date/Time Provider Specialty Site   10/17/2017 02:30 PM Devan Hyde MD Internal Medicine [facility]   09/15/2017 03:40 PM AMANDA Fowler  Hematology Oncology [facility]   11/13/2017 10:00 AM Connor Edmondson [address]     Patient Care Team    Care Team Member Role Specialty Office Number   Kathy Mo MD  Family Medicine [phone]   [facility] [phone]   Rosemarie Gaines, [facility] Specialist Cardiology [phone]   [place] [address] Surgery [phone]   Serge Eli DO Specialist Obstetrics/Gynecology [phone]   Kyle CARRASCO  Specialist Plastic Surgery [phone]   Robin Bosworth [facility]  Family Medicine [phone]   Laxmi Hilton [facility] [phone] [address] [facility], Podiatry Specialist  [phone]   Cynthia Anderson   [phone]   Alicia CARRASCO   Specialist Pulmonary Medicine [phone]     Signatures   Electronically signed by : Lavell Galloway RN; Jul 27 2017  2:58PM EST                       (Author)    Electronically signed by : Lavell Galloway RN; Jul 27 2017  3:00PM EST                       (Author)

## 2018-01-12 VITALS
HEIGHT: 67 IN | WEIGHT: 151 LBS | HEART RATE: 64 BPM | TEMPERATURE: 98.3 F | DIASTOLIC BLOOD PRESSURE: 54 MMHG | OXYGEN SATURATION: 96 % | SYSTOLIC BLOOD PRESSURE: 98 MMHG | BODY MASS INDEX: 23.7 KG/M2

## 2018-01-12 VITALS
DIASTOLIC BLOOD PRESSURE: 64 MMHG | WEIGHT: 150.13 LBS | OXYGEN SATURATION: 98 % | TEMPERATURE: 98.3 F | HEIGHT: 68 IN | BODY MASS INDEX: 22.75 KG/M2 | HEART RATE: 69 BPM | SYSTOLIC BLOOD PRESSURE: 98 MMHG

## 2018-01-13 VITALS
DIASTOLIC BLOOD PRESSURE: 70 MMHG | WEIGHT: 150 LBS | SYSTOLIC BLOOD PRESSURE: 110 MMHG | HEIGHT: 67 IN | RESPIRATION RATE: 16 BRPM | TEMPERATURE: 99.1 F | OXYGEN SATURATION: 97 % | BODY MASS INDEX: 23.54 KG/M2 | HEART RATE: 73 BPM

## 2018-01-13 VITALS
RESPIRATION RATE: 16 BRPM | OXYGEN SATURATION: 98 % | WEIGHT: 148 LBS | HEART RATE: 58 BPM | BODY MASS INDEX: 23.78 KG/M2 | TEMPERATURE: 97.6 F | SYSTOLIC BLOOD PRESSURE: 112 MMHG | DIASTOLIC BLOOD PRESSURE: 70 MMHG | HEIGHT: 66 IN

## 2018-01-13 NOTE — MISCELLANEOUS
Provider Comments  Provider Comments:   Dear Michael Vaughan had a scheduled appointment at our office today that you called to cancel but did not reschedule for another day  It is very important that you follow up with us so that we can assess your physical and nutritional safety after your surgery  Please call our office at [phone] to reschedule your appointment         Sincerely,     [facility]          Signatures   Electronically signed by : Cristal Alfaro, ; Nov 13 2017 10:02AM EST                       (Author)

## 2018-01-14 VITALS
BODY MASS INDEX: 23.88 KG/M2 | HEIGHT: 67 IN | OXYGEN SATURATION: 98 % | HEART RATE: 73 BPM | DIASTOLIC BLOOD PRESSURE: 80 MMHG | WEIGHT: 152.13 LBS | SYSTOLIC BLOOD PRESSURE: 110 MMHG | TEMPERATURE: 98.3 F

## 2018-01-14 VITALS
HEART RATE: 78 BPM | HEIGHT: 67 IN | SYSTOLIC BLOOD PRESSURE: 102 MMHG | OXYGEN SATURATION: 99 % | WEIGHT: 151 LBS | BODY MASS INDEX: 23.7 KG/M2 | TEMPERATURE: 96.7 F | DIASTOLIC BLOOD PRESSURE: 74 MMHG | RESPIRATION RATE: 18 BRPM

## 2018-01-14 VITALS
OXYGEN SATURATION: 97 % | HEIGHT: 67 IN | WEIGHT: 151.13 LBS | TEMPERATURE: 97.9 F | DIASTOLIC BLOOD PRESSURE: 70 MMHG | HEART RATE: 61 BPM | BODY MASS INDEX: 23.72 KG/M2 | SYSTOLIC BLOOD PRESSURE: 110 MMHG

## 2018-01-14 NOTE — MISCELLANEOUS
Message  Call to patient to discuss radiologist report of right rib series chest x-ray  Patient was informed that there is what appears to be mild effusion at the base of the right lung of uncertain etiology  Recommend she follow-up with her primary care doctor in the next couple days to discuss to see if re-x-ray should be done and/or CAT scan  Patient expresses understanding  Signatures   Electronically signed by :  Janina Bustillo, [facility]; Jun 12 2017  8:32PM EST                       (Author)

## 2018-01-15 VITALS
TEMPERATURE: 98.1 F | HEIGHT: 67 IN | HEART RATE: 68 BPM | DIASTOLIC BLOOD PRESSURE: 62 MMHG | BODY MASS INDEX: 24.01 KG/M2 | WEIGHT: 153 LBS | SYSTOLIC BLOOD PRESSURE: 98 MMHG

## 2018-01-15 NOTE — RESULT NOTES
Verified Results  CT CHEST W CONTRAST 45FTQ0969 05:49PM Molly Hayes     Test Name Result Flag Reference   CT CHEST W CONTRAST (Report)     CT CHEST WITH IV CONTRAST     INDICATION: Follow-up for bullous seen on chest CT  History of gastric bypass  COMPARISON: CT performed on 6/13/2017  TECHNIQUE: CT examination of the chest was performed  Reformatted images were created in axial, sagittal, and coronal planes  Radiation dose length product (DLP) for this visit: 210 mGy-cm   This examination, like all CT scans performed in the [facility], was performed utilizing techniques to minimize radiation dose exposure, including the use of iterative    reconstruction and automated exposure control  IV Contrast: 85 mL of iohexol (OMNIPAQUE)        FINDINGS:     LUNGS: Again noted is the giant bulla with signs of internal septations occupying much of the right lower lobe  There remains suggestion of trace fluid versus thickening along the posterior margin, unchanged  Otherwise, there is no clear nodular    component  Adjacent atelectasis is noted  No confluent airspace consolidation  No suspicious nodule  No additional signs of structural lung disease  PLEURA: Unremarkable  HEART/GREAT VESSELS: Unremarkable for patient's age  MEDIASTINUM AND MADELAINE: Unremarkable  CHEST WALL AND LOWER NECK: Unremarkable  VISUALIZED STRUCTURES IN THE UPPER ABDOMEN: Changes of presumed Marilee-en-Y bypass  OSSEOUS STRUCTURES: No acute fracture  No destructive osseous lesion  IMPRESSION:     Stable appearance to the large bulla in the right lower lobe  Trace fluid versus thickening in the right basilar dependent portion  No evidence for acuity or interval change         Workstation performed: [id number]     Signed by:   Anjana Macario MD   7/23/17

## 2018-01-15 NOTE — MISCELLANEOUS
Message  Left a message for this patient to call our office for an appointment  Active Problems    1  Abdominal pain, RUQ (789 01) (R10 11)   2  Acute pain of left knee (719 46) (M25 562)   3  Back pain (724 5) (M54 9)   4  Bloating (787 3) (R14 0)   5  Coccydynia (724 79) (M53 3)   6  Dehydration (276 51) (E86 0)   7  Encounter for cosmetic surgery (V50 1) (Z41 1)   8  Encounter for Papanicolaou smear of cervix (V76 2) (Z12 4)   9  Encounter for routine gynecological examination with Papanicolaou smear of cervix   (V72 31,V76 2) (Z01 419)   10  Hematuria (599 70) (R31 9)   11  History of allergy (V15 09) (Z88 9)   12  Hypocalcemia (275 41) (E83 51)   13  Denied: History of Mental disorder   14  Metrorrhagia (626 6) (N92 1)   15  Neoplasm of skin (239 2) (D49 2)   16  Obesity (278 00) (E66 9)   17  Postgastrectomy malabsorption (579 3) (K91 2,Z90 3)   18  Pre-operative cardiovascular examination (V72 81) (Z01 810)   19  Rectal bleeding (569 3) (K62 5)   20  Status post gastric bypass for obesity (V45 86) (Z98 84)   21  Upper respiratory infection (465 9) (J06 9)   22  Visit for screening mammogram (V76 12) (Z12 31)   23  Vitamin A deficiency (264 9) (E50 9)   24  Vitamin B deficiency (266 9) (E53 9)   25  Vitamin B12 deficiency (266 2) (E53 8)   26  Vitamin D deficiency (268 9) (E55 9)    Current Meds   1  Calcium 500 +D 500-400 MG-UNIT Oral Tablet; Take 1 tablet twice daily; Therapy: 08Apr2016 to [phone])  Requested for: 08Apr2016; Last   Rx:28Jpn1105 Ordered   2  Multivitamins CHEW Recorded   3  Vitamin D3 5000 UNIT Oral Capsule; TAKE 1 CAPSULE BY MOUTH EVERY DAY; Therapy: 08Apr2016 to [phone])  Requested for: 08Apr2016; Last   Rx:05Vdt6388 Ordered    Allergies    1   No Known Drug Allergies    Signatures   Electronically signed by : Derik Mcmahan, ; Jul 22 2016  2:50PM EST                       (Author)

## 2018-01-15 NOTE — RESULT NOTES
Verified Results  (1) CBC/ PLT (NO DIFF) [id number] 09:27AM Karolina Childress     Test Name Result Flag Reference   HEMATOCRIT 33 1 % L 34 8-46 1   HEMOGLOBIN 10 1 g/dL L 11 5-15 4   MCHC 30 5 g/dL L 31 4-37 4   MCH 23 4 pg L 26 8-34 3   MCV 77 fL L 82-98   PLATELET COUNT 565 Thousands/uL  149-390   RBC COUNT 4 32 Million/uL  3 81-5 12   RDW 15 9 % H 11 6-15 1   WBC COUNT 5 16 Thousand/uL  4 31-10 16   MPV 10 0 fL  8 9-12 7     (1) COMPREHENSIVE METABOLIC PANEL [id number] 16:64UH Shefali Edmondson     Test Name Result Flag Reference   SODIUM 141 mmol/L  136-145   POTASSIUM 3 7 mmol/L  3 5-5 3   CHLORIDE 106 mmol/L  100-108   CARBON DIOXIDE 27 mmol/L  21-32   ANION GAP (CALC) 8 mmol/L  4-13   BLOOD UREA NITROGEN 15 mg/dL  5-25   CREATININE 0 65 mg/dL  0 60-1 30   Standardized to IDMS reference method   CALCIUM 8 3 mg/dL  8 3-10 1   BILI, TOTAL 0 75 mg/dL  0 20-1 00   ALK PHOSPHATAS 84 U/L     ALT (SGPT) 18 U/L  12-78   AST(SGOT) 18 U/L  5-45   ALBUMIN 3 5 g/dL  3 5-5 0   TOTAL PROTEIN 6 7 g/dL  6 4-8 2   eGFR Non-African American      >60 0 ml/min/1 73sq [facility] Disease Education Program recommendations are as follows:  GFR calculation is accurate only with a steady state creatinine  Chronic Kidney disease less than 60 ml/min/1 73 sq  meters  Kidney failure less than 15 ml/min/1 73 sq  meters  GLUCOSE FASTING 87 mg/dL  65-99       Plan  Anemia, iron deficiency, Menorrhagia, Status post gastric bypass for obesity, Vitamin B12  deficiency    · *1 - [facility]  * s/p gastric surgery   evidence of  microcytic anemia-prior low iron studies and low vitamin B12 -please evaluate and  manage as needed-thanks  Status: Hold For - Scheduling  Requested for: [id number]  Care Summary provided  : Yes

## 2018-01-16 NOTE — PROGRESS NOTES
History of Present Illness  Care Coordination Encounter Information:   Type of Encounter: Telephonic   Contact: Follow-Up    Spoke to Patient  Care Coordination [facility] Nurse [facility]:   The reason for call is to discuss outreach for follow up/needed services  Spoke with pt as per her request to call back  she states that she is relieved because her CT scan came back basically unchanged from before with possibly less of a fluid collection  She has a follow up in September  Still has some chest discomfort which she will still follow with CT Surgeon about in the future  Will close now and does not need follow with Care Coordination at this time  Active Problems    1  Abnormal CT of the chest (793 2) (R93 8)   2  Anemia, iron deficiency (280 9) (D50 9)   3  Anterior pleuritic pain (786 52) (R07 81)   4  Back pain (724 5) (M54 9)   5  Bloating (787 3) (R14 0)   6  Bulla, lung (492 0) (J43 9)   7  Coccydynia (724 79) (M53 3)   8  Counseling for travel (V65 49) (Z71 89)   9  Dehydration (276 51) (E86 0)   10  Encounter for cosmetic surgery (V50 1) (Z41 1)   11  History of allergy (V15 09) (Z88 9)   12  Hypocalcemia (275 41) (E83 51)   13  Iron deficiency (280 9) (E61 1)   14  Kyphosis deformity of spine (737 10) (M40 209)   15  Loss of height (781 91) (R29 890)   16  Menopause (627 2) (Z78 0)   17  Menorrhagia (626 2) (N92 0)   18  Denied: History of Mental disorder   23  Metrorrhagia (626 6) (N92 1)   20  Neoplasm of skin (239 2) (D49 2)   21  Obesity (278 00) (E66 9)   22  Postgastrectomy malabsorption (579 3) (K91 2,Z90 3)   23  Primary osteoarthritis of right foot (715 17) (M19 071)   24  Right-sided chest pain (786 50) (R07 9)   25  Status post gastric bypass for obesity (V45 86) (Z98 84)   26  Vitamin B deficiency (266 9) (E53 9)   27  Vitamin B12 deficiency (266 2) (E53 8)   28  Vitamin D deficiency (268 9) (E55 9)    Past Medical History    1  History of Abdominal pain, epigastric (789 06) (R10 13)   2  History of Abdominal pain, RUQ (789 01) (R10 11)   3  History of Acute otitis media, unspecified laterality   4  History of Acute pain of left knee (719 46) (M25 562)   5  History of Acute right hip pain (719 45) (M25 551)   6  History of Benign essential hypertension (401 1) (I10)   7  Depression screen (V79 0) (Z13 89)   8  History of Dysphagia (787 20) (R13 10)   9  History of Encounter for Papanicolaou smear of cervix (V76 2) (Z12 4)   10  History of hematuria (V13 09) (Z87 448)   11  History of migraine (V12 49) (Z86 69)   12  History of rectal bleeding (V12 79) (Z87 19)   13  History of upper respiratory infection (V12 09) (Z87 09)   14  History of upper respiratory infection (V12 09) (Z87 09)   15  Denied: History of Mental disorder   16  History of Morbid or severe obesity due to excess calories (278 01) (E66 01)   17  History of Pain in eye, unspecified laterality (379 91) (H57 10)   18  History of Right foot pain (729 5) (M79 671)   19  History of Summary Of Previous Pregnancies  1  (Total No )   20  History of Summary Of Previous Pregnancies Para 1  (Deliveries)   21  History of URI, acute (465 9) (J06 9)   22  History of Urinary tract infection (599 0) (N39 0)   23  History of Vitamin A deficiency (264 9) (E50 9)    Surgical History    1  History of Appendectomy   2  History of A-V Malformation Repair Dural   3  History of  Section   4  History of Gastric Surgery For Morbid Obesity Gastric Bypass   5  History of Oral Surgery Tooth Extraction   6  History of Right Breast Lumpectomy   7  History of Tonsillectomy With Adenoidectomy   8  History of Tubal Ligation    Family History  Mother    1  Denied: Family history of Anxiety and depression   2  Family history of Atrial Fibrillation   3  Denied: Family history of Drug abuse   4  Family history of malignant melanoma (V16 8) (Z80 8)   5  History of thyroidectomy   6  Family history of Hypertension (V17 49)   7   No family history of mental disorder  Father    8  Denied: Family history of Anxiety and depression   9  Family history of Diabetes Mellitus (V18 0)   10  Denied: Family history of Drug abuse   11  Family history of Hypertension (V17 49)   12  No family history of mental disorder   15  Family history of Obesity  Maternal Aunt    14  Family history of malignant neoplasm of stomach (V16 0) (Z80 0)   15  Family history of GIST, malignant  Paternal Aunt    12  Family history of malignant neoplasm of stomach (V16 0) (Z80 0)  Family History    17  Family history of Allergies   18  Family history of Cancer   23  Family history of Diabetes Mellitus (V18 0)   20  Family history of Easy Bleeding   21  Family history of Graves' Disease   22  Family history of Heart Disease (V17 49)   23  Family history of Hypertension (V17 49)   24  Family history of Obesity   25  Family history of Reported Cholesterol Level Was High   26  Family history of Stroke Syndrome (V17 1)    Social History    · Denied: History of Alcohol Use (History)   · Being A Social Drinker   · Denied: History of Drug Use   · Marital History - Currently    · Never A Smoker   · 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    Current Meds    1  DiphenhydrAMINE HCl 50 MG/ML Injection Solution; USE AS DIRECTED; Therapy: 90CQU5387 to Recorded   2  Venofer 20 MG/ML Intravenous Solution; USE AS DIRECTED; Therapy: 35AER8335 to Recorded    3  Calcium 500 +D 500-400 MG-UNIT Oral Tablet; Take 1 tablet twice daily; Therapy: 08Apr2016 to (Clarisa Vargas)  Requested for: 63BSA7609; Last   Rx:13Kgi1302 Ordered    4  TraMADol HCl - 50 MG Oral Tablet; take 1 tablet every 8 hours as needed for pain; Therapy: 97WAZ4010 to (Last Rx:19Oct2016) Ordered    5  Fluticasone Propionate 50 MCG/ACT Nasal Suspension; USE 2 SPRAYS IN EACH   NOSTRIL ONCE DAILY; Therapy: 64DAF8556 to (Last CD:71ZHO8568)  Requested for: 77NZP4936 Ordered    6   Diclofenac Sodium 1 % Transdermal Gel (Voltaren); APPLY SPARINGLY TO AFFECTED   AREA(S) ONCE DAILY; Therapy: 19DSI8669 to (Last Rx:2017)  Requested for: 2017 Ordered    7  Vitamin D3 5000 UNIT Oral Capsule; TAKE 1 CAPSULE BY MOUTH EVERY DAY; Therapy: 2016 to (Gina Green)  Requested for: 80FAM6282; Last   Rx:97Zns1262 Ordered    8  Centrum Ultra Womens TABS; TAKE 1 TABLET EVERY 12 HOURS; Therapy: (TUCKLSJY:39WCZ7074) to Recorded   9  Iron 65 MG TABS; twice daily; Therapy: (BGEJJEI67XLZ7955) to Recorded   10  Vitamin B12 100 MCG Oral Tablet; twice daily; Therapy: (ITQCAHOF:50CQJ0364) to Recorded   11  Vitamin C Plus 1000 MG Oral Tablet; twice daily; Therapy: (Recorded:2017) to Recorded    Allergies    1  No Known Drug Allergies    2  Seasonal    End of Encounter Meds    1  DiphenhydrAMINE HCl 50 MG/ML Injection Solution; USE AS DIRECTED; Therapy: 27TWM4138 to Recorded   2  Venofer 20 MG/ML Intravenous Solution; USE AS DIRECTED; Therapy: 74MUX2481 to Recorded    3  Calcium 500 +D 500-400 MG-UNIT Oral Tablet; Take 1 tablet twice daily; Therapy: 2016 to (Gina Green)  Requested for: 75AGV2066; Last   Rx:74Qsj8525 Ordered    4  TraMADol HCl - 50 MG Oral Tablet; take 1 tablet every 8 hours as needed for pain; Therapy: 02COS1910 to (Last Rx:2016) Ordered    5  Fluticasone Propionate 50 MCG/ACT Nasal Suspension; USE 2 SPRAYS IN EACH   NOSTRIL ONCE DAILY; Therapy: 04DTY5816 to (Last OQ:74UZJ0030)  Requested for: 57GTT8986 Ordered    6  Diclofenac Sodium 1 % Transdermal Gel (Voltaren); APPLY SPARINGLY TO AFFECTED   AREA(S) ONCE DAILY; Therapy: 96RGG7583 to (Last Rx:2017)  Requested for: 2017 Ordered    7  Vitamin D3 5000 UNIT Oral Capsule; TAKE 1 CAPSULE BY MOUTH EVERY DAY; Therapy: 2016 to (Gina Green)  Requested for: 57QAG9921; Last   Rx:66Hpk1114 Ordered    8  Centrum Ultra Womens TABS; TAKE 1 TABLET EVERY 12 HOURS; Therapy: (CHWSEXMY:61MFZ4907) to Recorded   9  Iron 65 MG TABS; twice daily;    Therapy: (YZLRMPCP:24SVZ7427) to Recorded   10  Vitamin B12 100 MCG Oral Tablet; twice daily; Therapy: (GCCKJYKE:96BGD2758) to Recorded   11  Vitamin C Plus 1000 MG Oral Tablet; twice daily; Therapy: (ZGGSFYET:39QMP3605) to Recorded    Future Appointments    Date/Time Provider Specialty Site   10/17/2017 02:30 PM Compa Prieto MD Internal Medicine [facility]   09/15/2017 03:40 PM AMANDA Lemons  Hematology Oncology [facility]   11/13/2017 10:00 AM Gabriella Edmondson [address]     Patient Care Team    Care Team Member Role Specialty Office Number   Julius Tay MD  Family Medicine [phone]   [facility] [phone]   [address] [place] [address] Specialist Cardiology [phone]   [place] [address] Surgery [phone]   Stu Gannon DO Specialist Obstetrics/Gynecology [phone]   Hyacinth PATEL D  Specialist Plastic Surgery [phone]   [facility]  Family Medicine [phone]   Farrel Passe [facility] [phone] [address] [facility], Podiatry Specialist  [phone]   Hector Blades   [phone]   Sultana CARRASCO   Specialist Pulmonary Medicine [phone]     Signatures   Electronically signed by : Emma Jones RN; Aug  1 2017 12:31PM EST                       (Author)

## 2018-01-18 NOTE — RESULT NOTES
Verified Results  (1) CBC/PLT/DIFF 07Apr2016 08:30AM Disha Mendez Order Number: [id number]     Order Number: [id number]     Test Name Result Flag Reference   WBC COUNT 6 24 Thousand/uL  4 31-10 16   RBC COUNT 4 62 Million/uL  3 81-5 12   HEMOGLOBIN 12 4 g/dL  11 5-15 4   HEMATOCRIT 39 2 %  34 8-46  1   MCV 85 fL  82-98   MCH 26 8 pg  26 8-34 3   MCHC 31 6 g/dL  31 4-37 4   RDW 14 6 %  11 6-15 1   MPV 11 0 fL  8 9-12 7   PLATELET COUNT 351 Thousands/uL  149-390   nRBC AUTOMATED 0 /100 WBCs     NEUTROPHILS RELATIVE PERCENT 64 %  43-75   LYMPHOCYTES RELATIVE PERCENT 23 %  14-44   MONOCYTES RELATIVE PERCENT 10 %  4-12   EOSINOPHILS RELATIVE PERCENT 2 %  0-6   BASOPHILS RELATIVE PERCENT 1 %  0-1   NEUTROPHILS ABSOLUTE COUNT 4 07 Thousands/µL  1 85-7 62   LYMPHOCYTES ABSOLUTE COUNT 1 41 Thousands/µL  0 60-4 47   MONOCYTES ABSOLUTE COUNT 0 60 Thousand/µL  0 17-1 22   EOSINOPHILS ABSOLUTE COUNT 0 10 Thousand/µL  0 00-0 61   BASOPHILS ABSOLUTE COUNT 0 05 Thousands/µL  0 00-0 10     (1) COMPREHENSIVE METABOLIC PANEL 00UEY5264 64:77LC Disha Mendez Order Number: [id number]      National Kidney Disease Education Program recommendations are as follows:  GFR calculation is accurate only with a steady state creatinine  Chronic Kidney disease less than 60 ml/min/1 73 sq  meters  Kidney failure less than 15 ml/min/1 73 sq  meters  Test Name Result Flag Reference   GLUCOSE,RANDM 84 mg/dL     If the patient is fasting, the ADA then defines impaired fasting glucose as > 100 mg/dL and diabetes as > or equal to 123 mg/dL     SODIUM 142 mmol/L  136-145   POTASSIUM 4 7 mmol/L  3 5-5 3   CHLORIDE 107 mmol/L  100-108   CARBON DIOXIDE 27 mmol/L  21-32   ANION GAP (CALC) 8 mmol/L  4-13   BLOOD UREA NITROGEN 17 mg/dL  5-25   CREATININE 0 70 mg/dL  0 60-1 30   Standardized to IDMS reference method   CALCIUM 7 9 mg/dL L 8 3-10 1   BILI, TOTAL 0 67 mg/dL  0 20-1 00   ALK PHOSPHATAS 82 U/L     ALT (SGPT) 18 U/L 78   AST(SGOT) 15 U/L  5-45   ALBUMIN 3 7 g/dL  3 5-5 0   TOTAL PROTEIN 6 6 g/dL  6 4-8 2   eGFR Non-African American      >60 0 ml/min/1 73sq m     (1) LIPID PANEL, FASTING 2016 08:30AM [place] Order Number: [id number]      Triglyceride:         Normal              <150 mg/dl       Borderline High    150-199 mg/dl       High               200-499 mg/dl       Very High          >499 mg/dl  Cholesterol:         Desirable        <200 mg/dl      Borderline High  200-239 mg/dl      High             >239 mg/dl  HDL Cholesterol:        High    >59 mg/dL      Low     <41 mg/dL     Test Name Result Flag Reference   CHOLESTEROL 171 mg/dL     HDL,DIRECT 64 mg/dL H 40-60   LDL CHOLESTEROL CALCULATED 94 mg/dL  0-100   TRIGLYCERIDES 63 mg/dL  <=150   Specimen collection should occur prior to N-Acetylcysteine or Metamizole administration due to the potential for falsely depressed results       (1) IRON 2016 08:30AM [place]  Order Number: [id number]     Test Name Result Flag Reference   IRON 74 ug/dL       (1) TIBC 2016 08:30AM [place] Order Number: [id number]     Test Name Result Flag Reference   TOTAL IRON BINDING CAPACITY 485 ug/dL H 250-450     (1) VITAMIN D 25-HYDROXY 2016 08:30AM [place] Order Number: [id number]     Order Number: [id number]     Test Name Result Flag Reference   VIT D 25-HYDROX 23 6 ng/mL L 30 0-100 0     (1) FERRITIN 2016 08:30AM [place] Order Number: [id number]     Test Name Result Flag Reference   FERRITIN 4 ng/mL L 8-388     (1) FOLATE 2016 08:30AM [place] Order Number: [id number]     Test Name Result Flag Reference   FOLATE 18 7 ng/mL H 3 1-17 5     (1) VITAMIN B12 2016 08:30AM [place] Order Number: [id number]     Test Name Result Flag Reference   VITAMIN B12 314 pg/mL  100-900       Plan  Hypocalcemia, Vitamin D deficiency    · Calcium 500 +D 500-400 MG-UNIT Oral Tablet; Take 1 tablet twice daily  Vitamin D deficiency    · Vitamin D3 5000 UNIT Oral Capsule; TAKE 1 CAPSULE BY MOUTH EVERY DAY

## 2018-01-22 VITALS
HEIGHT: 68 IN | RESPIRATION RATE: 16 BRPM | OXYGEN SATURATION: 98 % | DIASTOLIC BLOOD PRESSURE: 66 MMHG | WEIGHT: 154 LBS | BODY MASS INDEX: 23.34 KG/M2 | HEART RATE: 64 BPM | TEMPERATURE: 97.9 F | SYSTOLIC BLOOD PRESSURE: 100 MMHG

## 2018-01-22 VITALS
OXYGEN SATURATION: 98 % | HEIGHT: 66 IN | DIASTOLIC BLOOD PRESSURE: 74 MMHG | BODY MASS INDEX: 24.04 KG/M2 | HEART RATE: 68 BPM | WEIGHT: 149.6 LBS | TEMPERATURE: 97.6 F | SYSTOLIC BLOOD PRESSURE: 106 MMHG

## 2018-01-23 VITALS
SYSTOLIC BLOOD PRESSURE: 120 MMHG | RESPIRATION RATE: 16 BRPM | BODY MASS INDEX: 24.11 KG/M2 | TEMPERATURE: 97.1 F | DIASTOLIC BLOOD PRESSURE: 80 MMHG | WEIGHT: 150 LBS | OXYGEN SATURATION: 96 % | HEART RATE: 84 BPM | HEIGHT: 66 IN

## 2018-02-25 ENCOUNTER — OFFICE VISIT (OUTPATIENT)
Dept: URGENT CARE | Age: 43
End: 2018-02-25
Payer: COMMERCIAL

## 2018-02-25 VITALS
DIASTOLIC BLOOD PRESSURE: 62 MMHG | HEIGHT: 68 IN | WEIGHT: 140 LBS | BODY MASS INDEX: 21.22 KG/M2 | OXYGEN SATURATION: 98 % | RESPIRATION RATE: 20 BRPM | TEMPERATURE: 99.2 F | HEART RATE: 60 BPM | SYSTOLIC BLOOD PRESSURE: 112 MMHG

## 2018-02-25 DIAGNOSIS — N39.0 URINARY TRACT INFECTION WITHOUT HEMATURIA, SITE UNSPECIFIED: Primary | ICD-10-CM

## 2018-02-25 LAB
SL AMB  POCT GLUCOSE, UA: ABNORMAL
SL AMB LEUKOCYTE ESTERASE,UA: ABNORMAL
SL AMB POCT BILIRUBIN,UA: ABNORMAL
SL AMB POCT BLOOD,UA: ABNORMAL
SL AMB POCT CLARITY,UA: ABNORMAL
SL AMB POCT COLOR,UA: YELLOW
SL AMB POCT KETONES,UA: ABNORMAL
SL AMB POCT NITRITE,UA: ABNORMAL
SL AMB POCT PH,UA: 6
SL AMB POCT SPECIFIC GRAVITY,UA: 1.03
SL AMB POCT URINE PROTEIN: ABNORMAL
SL AMB POCT UROBILINOGEN: 0.2

## 2018-02-25 PROCEDURE — 87186 SC STD MICRODIL/AGAR DIL: CPT | Performed by: PHYSICIAN ASSISTANT

## 2018-02-25 PROCEDURE — 87077 CULTURE AEROBIC IDENTIFY: CPT | Performed by: PHYSICIAN ASSISTANT

## 2018-02-25 PROCEDURE — 81002 URINALYSIS NONAUTO W/O SCOPE: CPT | Performed by: PREVENTIVE MEDICINE

## 2018-02-25 PROCEDURE — 99213 OFFICE O/P EST LOW 20 MIN: CPT | Performed by: PREVENTIVE MEDICINE

## 2018-02-25 PROCEDURE — 87086 URINE CULTURE/COLONY COUNT: CPT | Performed by: PHYSICIAN ASSISTANT

## 2018-02-25 RX ORDER — SULFAMETHOXAZOLE AND TRIMETHOPRIM 800; 160 MG/1; MG/1
1 TABLET ORAL EVERY 12 HOURS SCHEDULED
Qty: 10 TABLET | Refills: 0 | Status: SHIPPED | OUTPATIENT
Start: 2018-02-25 | End: 2018-03-02

## 2018-02-25 NOTE — PROGRESS NOTES
[facility]        NAME: Fer Childress is a 43 y o  female  : 1975    MRN: [id number]  DATE: 2018  TIME: 10:55 AM    Assessment and Plan   Urinary tract infection without hematuria, site unspecified [N39 0]  1  Urinary tract infection without hematuria, site unspecified  sulfamethoxazole-trimethoprim (BACTRIM DS) 800-160 mg per tablet         Patient Instructions     Take bactrim twice daily for 5 days- recommend probiotics daily  Call us in 2-3 days for urine culture results  Go to the ER if you worsen  Chief Complaint     Chief Complaint   Patient presents with    Urinary Frequency     Been having issues since Wednesday with frequency, burning sensation when she voids         History of Present Illness       44 Y/o female with c/o urinary burning x 5 days  Has been taking AZO without relief  No f/c/n/v/d/flank pain/vaginal discharge  No new sexual partners  Review of Systems   Review of Systems   Constitutional: Negative for fever  Gastrointestinal: Negative for nausea and vomiting  Genitourinary: Positive for dysuria and frequency  Negative for vaginal discharge  All other systems reviewed and are negative          Current Medications       Current Outpatient Prescriptions:     Ascorbic Acid ([employer]S VITAMIN C) 500 MG CHEW, Chew 1 tablet 2 (two) times a day, Disp: , Rfl:     calcium carbonate-vitamin D (OSCAL-D) 500 mg-200 units per tablet, Take 1 tablet by mouth 2 (two) times a day with meals, Disp: , Rfl:     cyanocobalamin (VITAMIN B-12) 500 mcg tablet, Take 500 mcg by mouth daily, Disp: , Rfl:     ferrous sulfate (CVS IRON) 325 (65 Fe) mg tablet, Take 325 mg by mouth 2 (two) times a day, Disp: , Rfl:     multivitamin (THERAGRAN) TABS, Take 1 tablet by mouth 2 (two) times a day, Disp: , Rfl:     sucralfate (CARAFATE) 1 g/10 mL suspension, Take 10 mL by mouth 4 (four) times a day, Disp: 420 mL, Rfl: 0    sulfamethoxazole-trimethoprim (BACTRIM DS) 800-160 mg per tablet, Take 1 tablet by mouth every 12 (twelve) hours for 5 days, Disp: 10 tablet, Rfl: 0    Current Allergies     Allergies as of 2018    (No Known Allergies)            The following portions of the patient's history were reviewed and updated as appropriate: allergies, current medications, past family history, past medical history, past social history, past surgical history and problem list    No past medical history on file  Past Surgical History:   Procedure Laterality Date    APPENDECTOMY      BRAIN SURGERY      AVM    BREAST LUMPECTOMY Right 1992     SECTION      HITESH-EN-Y PROCEDURE      TONSILLECTOMY      including adenoids           Objective   /62 (BP Location: Left arm, Patient Position: Sitting, Cuff Size: Standard)   Pulse 60   Temp 99 2 °F (37 3 °C) (Temporal)   Resp 20   Ht 5' 8" (1 727 m)   Wt 63 5 kg (140 lb)   LMP 2018   SpO2 98%   BMI 21 29 kg/m²        Physical Exam     Physical Exam   Constitutional: She appears well-developed and well-nourished  Cardiovascular: Normal rate, regular rhythm and normal heart sounds  Pulmonary/Chest: Effort normal and breath sounds normal    Abdominal: Soft  Bowel sounds are normal  There is no tenderness  There is no CVA tenderness  Neurological: She is alert  Psychiatric: Her behavior is normal    Nursing note and vitals reviewed  Urine dipstick shows negative for all components, positive for leukocytes, red blood cells

## 2018-02-25 NOTE — PATIENT INSTRUCTIONS
Take bactrim twice daily for 5 days- recommend probiotics daily  Call us in 2-3 days for urine culture results  Go to the ER if you worsen  Urinary Tract Infection in Women   WHAT YOU NEED TO KNOW:   A urinary tract infection (UTI) is caused by bacteria that get inside your urinary tract  Most bacteria that enter your urinary tract come out when you urinate  If the bacteria stay in your urinary tract, you may get an infection  Your urinary tract includes your kidneys, ureters, bladder, and urethra  Urine is made in your kidneys, and it flows from the ureters to the bladder  Urine leaves the bladder through the urethra  A UTI is more common in your lower urinary tract, which includes your bladder and urethra  DISCHARGE INSTRUCTIONS:   Return to the emergency department if:   · You are urinating very little or not at all  · You have a high fever with shaking chills  · You have side or back pain that gets worse  Contact your healthcare provider if:   · You have a fever  · You do not feel better after 2 days of taking antibiotics  · You are vomiting  · You have questions or concerns about your condition or care  Medicines:   · Antibiotics  help fight a bacterial infection  · Medicines  may be given to decrease pain and burning when you urinate  They will also help decrease the feeling that you need to urinate often  These medicines will make your urine orange or red  · Take your medicine as directed  Contact your healthcare provider if you think your medicine is not helping or if you have side effects  Tell him or her if you are allergic to any medicine  Keep a list of the medicines, vitamins, and herbs you take  Include the amounts, and when and why you take them  Bring the list or the pill bottles to follow-up visits  Carry your medicine list with you in case of an emergency    Follow up with your healthcare provider as directed:  Write down your questions so you remember to ask them during your visits  Prevent another UTI:   · Empty your bladder often  Urinate and empty your bladder as soon as you feel the need  Do not hold your urine for long periods of time  · Wipe from front to back after you urinate or have a bowel movement  This will help prevent germs from getting into your urinary tract through your urethra  · Drink liquids as directed  Ask how much liquid to drink each day and which liquids are best for you  You may need to drink more liquids than usual to help flush out the bacteria  Do not drink alcohol, caffeine, or citrus juices  These can irritate your bladder and increase your symptoms  Your healthcare provider may recommend cranberry juice to help prevent a UTI  · Urinate after you have sex  This can help flush out bacteria passed during sex  · Do not douche or use feminine deodorants  These can change the chemical balance in your vagina  · Change sanitary pads or tampons often  This will help prevent germs from getting into your urinary tract  · Do pelvic muscle exercises often  Pelvic muscle exercises may help you start and stop urinating  Strong pelvic muscles may help you empty your bladder easier  Squeeze these muscles tightly for 5 seconds like you are trying to hold back urine  Then relax for 5 seconds  Gradually work up to squeezing for 10 seconds  Do 3 sets of 15 repetitions a day, or as directed  © 2017 [facility] Information is for End User's use only and may not be sold, redistributed or otherwise used for commercial purposes  All illustrations and images included in CareNotes® are the copyrighted property of A D A M , Inc  or Nils Palmer  The above information is an  only  It is not intended as medical advice for individual conditions or treatments  Talk to your doctor, nurse or pharmacist before following any medical regimen to see if it is safe and effective for you

## 2018-02-27 ENCOUNTER — OFFICE VISIT (OUTPATIENT)
Dept: GYNECOLOGY | Facility: CLINIC | Age: 43
End: 2018-02-27
Payer: COMMERCIAL

## 2018-02-27 ENCOUNTER — TELEPHONE (OUTPATIENT)
Dept: URGENT CARE | Age: 43
End: 2018-02-27

## 2018-02-27 VITALS
DIASTOLIC BLOOD PRESSURE: 68 MMHG | HEIGHT: 66 IN | WEIGHT: 152.6 LBS | BODY MASS INDEX: 24.53 KG/M2 | SYSTOLIC BLOOD PRESSURE: 102 MMHG

## 2018-02-27 DIAGNOSIS — Z12.4 ENCOUNTER FOR PAPANICOLAOU SMEAR FOR CERVICAL CANCER SCREENING: ICD-10-CM

## 2018-02-27 DIAGNOSIS — N92.0 MENORRHAGIA WITH REGULAR CYCLE: ICD-10-CM

## 2018-02-27 DIAGNOSIS — Z01.411 ENCOUNTER FOR GYNECOLOGICAL EXAMINATION WITH ABNORMAL FINDING: ICD-10-CM

## 2018-02-27 DIAGNOSIS — Z12.31 ENCOUNTER FOR SCREENING MAMMOGRAM FOR MALIGNANT NEOPLASM OF BREAST: Primary | ICD-10-CM

## 2018-02-27 DIAGNOSIS — N94.6 DYSMENORRHEA: ICD-10-CM

## 2018-02-27 LAB — BACTERIA UR CULT: ABNORMAL

## 2018-02-27 PROCEDURE — S0610 ANNUAL GYNECOLOGICAL EXAMINA: HCPCS | Performed by: OBSTETRICS & GYNECOLOGY

## 2018-02-27 PROCEDURE — G0145 SCR C/V CYTO,THINLAYER,RESCR: HCPCS | Performed by: OBSTETRICS & GYNECOLOGY

## 2018-02-27 NOTE — PROGRESS NOTES
Assessment/Plan:    No problem-specific Assessment & Plan notes found for this encounter  Diagnoses and all orders for this visit:    Encounter for screening mammogram for malignant neoplasm of breast  -     Mammo screening bilateral w cad; Future    Encounter for gynecological examination with abnormal finding    Menorrhagia with regular cycle    Dysmenorrhea    will return to the office for transvaginal sonography saline history of sonography possible endometrial biopsy  Assuming the above is normal other options have been discussed including, following, Lysteda, hormonal management, or surgical management management including an endometrial ablation all    Subjective:      Patient ID: Grant Marie is a 43 y o  female  HPI   patient presents to the office today complaining of heavy menses  This has been going on for 1-2 years progressively getting worse  This is associated with cramps and passage of large clots  Patient has had a prior tubal   She has also had a prior gastric bypass and therefore cannot take NSAIDs  The following portions of the patient's history were reviewed and updated as appropriate:   She  has a past medical history of Hypocalcemia; Iron deficiency; Kyphosis deformity of spine; and Migraine  She There are no active problems to display for this patient  She  has a past surgical history that includes Marilee-en-y procedure ();  section (); Breast lumpectomy (Right, ); Tonsillectomy; Brain surgery (); and Appendectomy  Her family history includes Atrial fibrillation in her father and mother; Hypertension in her father and mother  She  reports that she has never smoked  She has never used smokeless tobacco  She reports that she drinks alcohol  She reports that she does not use drugs    Current Outpatient Prescriptions   Medication Sig Dispense Refill    Ascorbic Acid ([facility] VITAMIN C) 500 MG CHEW Chew 1 tablet 2 (two) times a day      calcium carbonate-vitamin D (OSCAL-D) 500 mg-200 units per tablet Take 1 tablet by mouth 2 (two) times a day with meals      cyanocobalamin (VITAMIN B-12) 500 mcg tablet Take 500 mcg by mouth daily      ferrous sulfate ([facility] IRON) 325 (65 Fe) mg tablet Take 325 mg by mouth 2 (two) times a day      multivitamin (THERAGRAN) TABS Take 1 tablet by mouth 2 (two) times a day      sucralfate (CARAFATE) 1 g/10 mL suspension Take 10 mL by mouth 4 (four) times a day 420 mL 0    sulfamethoxazole-trimethoprim (BACTRIM DS) 800-160 mg per tablet Take 1 tablet by mouth every 12 (twelve) hours for 5 days 10 tablet 0     No current facility-administered medications for this visit  Current Outpatient Prescriptions on File Prior to Visit   Medication Sig    Ascorbic Acid ([facility] VITAMIN C) 500 MG CHEW Chew 1 tablet 2 (two) times a day    calcium carbonate-vitamin D (OSCAL-D) 500 mg-200 units per tablet Take 1 tablet by mouth 2 (two) times a day with meals    cyanocobalamin (VITAMIN B-12) 500 mcg tablet Take 500 mcg by mouth daily    ferrous sulfate ([facility]S IRON) 325 (65 Fe) mg tablet Take 325 mg by mouth 2 (two) times a day    multivitamin (THERAGRAN) TABS Take 1 tablet by mouth 2 (two) times a day    sucralfate (CARAFATE) 1 g/10 mL suspension Take 10 mL by mouth 4 (four) times a day    sulfamethoxazole-trimethoprim (BACTRIM DS) 800-160 mg per tablet Take 1 tablet by mouth every 12 (twelve) hours for 5 days     No current facility-administered medications on file prior to visit  She has No Known Allergies       Review of Systems      Objective:      /68   Ht 5' 6 14" (1 68 m)   Wt 69 2 kg (152 lb 9 6 oz)   LMP 02/10/2018 (Exact Date)   BMI 24 52 kg/m²          Physical Exam   Constitutional: She appears well-developed and well-nourished  Neck: Normal range of motion  No thyromegaly present  Cardiovascular: Normal rate, regular rhythm and normal heart sounds      Pulmonary/Chest: Effort normal and breath sounds normal  Right breast exhibits no inverted nipple, no mass, no nipple discharge, no skin change and no tenderness  Left breast exhibits no inverted nipple, no mass, no nipple discharge, no skin change and no tenderness  Abdominal: Soft  She exhibits no mass  There is no tenderness  Hernia confirmed negative in the right inguinal area and confirmed negative in the left inguinal area  Genitourinary: Vagina normal and uterus normal  There is no rash or lesion on the right labia  There is no rash or lesion on the left labia  Cervix exhibits no motion tenderness, no discharge and no friability  Right adnexum displays no mass, no tenderness and no fullness  Left adnexum displays no mass, no tenderness and no fullness  Lymphadenopathy:        Right: No inguinal adenopathy present  Left: No inguinal adenopathy present

## 2018-03-07 LAB
LAB AP GYN PRIMARY INTERPRETATION: NORMAL
Lab: NORMAL

## 2018-03-14 ENCOUNTER — OFFICE VISIT (OUTPATIENT)
Dept: GYNECOLOGY | Facility: CLINIC | Age: 43
End: 2018-03-14
Payer: COMMERCIAL

## 2018-03-14 ENCOUNTER — ULTRASOUND (OUTPATIENT)
Dept: GYNECOLOGY | Facility: CLINIC | Age: 43
End: 2018-03-14
Payer: COMMERCIAL

## 2018-03-14 DIAGNOSIS — N92.0 MENORRHAGIA WITH REGULAR CYCLE: Primary | ICD-10-CM

## 2018-03-14 PROCEDURE — 58340 CATHETER FOR HYSTEROGRAPHY: CPT | Performed by: OBSTETRICS & GYNECOLOGY

## 2018-03-14 PROCEDURE — 58100 BIOPSY OF UTERUS LINING: CPT | Performed by: OBSTETRICS & GYNECOLOGY

## 2018-03-14 PROCEDURE — 76830 TRANSVAGINAL US NON-OB: CPT | Performed by: OBSTETRICS & GYNECOLOGY

## 2018-03-14 PROCEDURE — 76831 ECHO EXAM UTERUS: CPT | Performed by: OBSTETRICS & GYNECOLOGY

## 2018-03-14 PROCEDURE — 88305 TISSUE EXAM BY PATHOLOGIST: CPT | Performed by: PATHOLOGY

## 2018-03-14 PROCEDURE — 88342 IMHCHEM/IMCYTCHM 1ST ANTB: CPT | Performed by: PATHOLOGY

## 2018-03-14 PROCEDURE — 99213 OFFICE O/P EST LOW 20 MIN: CPT | Performed by: OBSTETRICS & GYNECOLOGY

## 2018-03-14 PROCEDURE — 88341 IMHCHEM/IMCYTCHM EA ADD ANTB: CPT | Performed by: PATHOLOGY

## 2018-03-14 NOTE — PROGRESS NOTES
Headache presents to the office today for transvaginal sonography saline infusion hysterosonography possible endometrial biopsy secondary to menorrhagia with irregular cycle  Patient has had a prior tubal   She underwent transvaginal scan which revealed endometrial thickening therefore an endometrial biopsy was obtained SIS reveals no submucosal polyps or fibroids  Impression menorrhagia with regular cycle    Plan:  Discussed options for control of menorrhagia including hormonal manipulation discussed nonsteroidals although the patient cannot tolerate these due to a prior gastric bypass, discussed Lysteda, discussed surgical management including endometrial ablation  Patient does desire an endometrial ablation the procedure and risks associated with such procedure have been discussed    Endometrial biopsy  Date/Time: 3/14/2018 2:50 PM  Performed by: Bailey Hart by: Antonio Ware     Consent:     Consent obtained:  Written    Consent given by:  Patient    Procedural risks discussed:  Bleeding and infection    Patient questions answered: yes      Patient agrees, verbalizes understanding, and wants to proceed: yes      Educational handouts given: no      Instructions and paperwork completed: yes    Indication:     Indications:  Other disorder of menstruation and other abnormal bleeding from female genital tract    Pre-procedure:     Pre-procedure timeout performed: no    Procedure:     Procedure: endometrial biopsy with Pipelle      A bivalve speculum was placed in the vagina: yes      Cervix cleaned and prepped: yes      A paracervical block was performed: no      An intracervical block was performed: no      The cervix was dilated: yes      Uterus sounded: yes      Uterus sound depth (cm):  8    Specimen collected: specimen collected and sent to pathology      Patient tolerated procedure well with no complications: yes    Findings:     Uterus size:  Non-gravid    Cervix: normal Adnexa: normal

## 2018-07-12 ENCOUNTER — OPTICAL OFFICE (OUTPATIENT)
Dept: URBAN - METROPOLITAN AREA CLINIC 143 | Facility: CLINIC | Age: 43
Setting detail: OPHTHALMOLOGY
End: 2018-07-12
Payer: COMMERCIAL

## 2018-07-12 ENCOUNTER — DOCTOR'S OFFICE (OUTPATIENT)
Dept: URBAN - METROPOLITAN AREA CLINIC 136 | Facility: CLINIC | Age: 43
Setting detail: OPHTHALMOLOGY
End: 2018-07-12
Payer: COMMERCIAL

## 2018-07-12 DIAGNOSIS — H52.4: ICD-10-CM

## 2018-07-12 PROCEDURE — 92015 DETERMINE REFRACTIVE STATE: CPT | Performed by: OPTOMETRIST

## 2018-07-12 PROCEDURE — V2200 LENS SPHER BIFOC PLANO 4.00D: HCPCS | Performed by: OPTOMETRIST

## 2018-07-12 PROCEDURE — V2020 VISION SVCS FRAMES PURCHASES: HCPCS | Performed by: OPTOMETRIST

## 2018-07-12 PROCEDURE — 92014 COMPRE OPH EXAM EST PT 1/>: CPT | Performed by: OPTOMETRIST

## 2018-07-12 ASSESSMENT — REFRACTION_OUTSIDERX
OS_VA3: 20/
OS_ADD: +1.25
OD_VA1: 20/20
OD_ADD: +1.25
OS_VA2: 20/
OS_VA1: 20/20
OD_SPHERE: PLANO
OD_VA2: 20/
OS_SPHERE: PLANO
OU_VA: 20/
OD_VA3: 20/

## 2018-07-12 ASSESSMENT — REFRACTION_MANIFEST
OS_VA3: 20/
OU_VA: 20/
OS_VA3: 20/
OS_VA2: 20/
OS_VA1: 20/
OD_VA2: 20/
OS_VA1: 20/
OD_VA3: 20/
OD_VA2: 20/
OS_VA2: 20/
OD_VA1: 20/
OD_VA3: 20/
OD_VA1: 20/
OU_VA: 20/

## 2018-07-12 ASSESSMENT — REFRACTION_AUTOREFRACTION
OD_CYLINDER: -1.00
OD_AXIS: 144
OD_SPHERE: +0.25
OS_CYLINDER: -0.75
OS_SPHERE: -0.25
OS_AXIS: 111

## 2018-07-12 ASSESSMENT — REFRACTION_CURRENTRX
OS_VPRISM_DIRECTION: BF
OS_SPHERE: PLANO
OS_OVR_VA: 20/
OS_ADD: +1.00
OS_OVR_VA: 20/
OS_OVR_VA: 20/
OD_ADD: +1.00
OD_OVR_VA: 20/
OD_OVR_VA: 20/
OD_VPRISM_DIRECTION: BF
OD_OVR_VA: 20/
OD_SPHERE: PLANO

## 2018-07-12 ASSESSMENT — CONFRONTATIONAL VISUAL FIELD TEST (CVF)
OD_FINDINGS: FULL
OS_FINDINGS: FULL

## 2018-07-12 ASSESSMENT — SPHEQUIV_DERIVED
OS_SPHEQUIV: -0.625
OD_SPHEQUIV: -0.25

## 2018-07-12 ASSESSMENT — VISUAL ACUITY
OS_BCVA: 20/20
OD_BCVA: 20/20

## 2018-07-23 ENCOUNTER — OFFICE VISIT (OUTPATIENT)
Dept: INTERNAL MEDICINE CLINIC | Age: 43
End: 2018-07-23
Payer: COMMERCIAL

## 2018-07-23 VITALS
DIASTOLIC BLOOD PRESSURE: 78 MMHG | OXYGEN SATURATION: 97 % | RESPIRATION RATE: 16 BRPM | SYSTOLIC BLOOD PRESSURE: 110 MMHG | TEMPERATURE: 97.1 F | HEART RATE: 72 BPM | BODY MASS INDEX: 24.11 KG/M2 | HEIGHT: 67 IN | WEIGHT: 153.6 LBS

## 2018-07-23 DIAGNOSIS — H93.8X2 SENSATION OF FULLNESS IN LEFT EAR: ICD-10-CM

## 2018-07-23 DIAGNOSIS — J30.2 SEASONAL ALLERGIC RHINITIS, UNSPECIFIED TRIGGER: Primary | ICD-10-CM

## 2018-07-23 DIAGNOSIS — R73.9 ELEVATED SERUM GLUCOSE: ICD-10-CM

## 2018-07-23 PROCEDURE — 99213 OFFICE O/P EST LOW 20 MIN: CPT | Performed by: NURSE PRACTITIONER

## 2018-07-23 NOTE — PROGRESS NOTES
Assessment/Plan:    Patient to schedule routine follow up/physical in next several weeks  She will need to be seen for further work up should her fasting glucose be elevated  Patient aware  Diagnoses and all orders for this visit:    Seasonal allergic rhinitis, unspecified trigger        Patient already use Flonase on a daily basis  She does have Claritin at home and I recommended that she take one daily for the next several weeks  I also stated she could try [place] Nasal spray throughout the day to help clear her eustacian tubes and sinuses  Did inform he to call the office should her symptoms worsen or she develop pain, fever, chills  No sign of infection at this time  Elevated serum glucose  Patient has not had elevated fasting blood glucose levels in the past but did have a reading of 210 post-prandial at her work  Will check fasting glucose  If this comes back elevated then will order hemoglobin A1C   -     Basic metabolic panel; Future    Sensation of fullness in left ear       See above  Subjective:      Patient ID: Tricia Khan is a 37 y o  female  Patient is being seem for an acute visit due to left ear "fullness" and decreased hearing  Patient denies trauma to ear  Denies pain  Denies other HEENT symptoms  She states her left ear symptoms started this morning  She work in a [employer] ([employer]) and there is a lot of noise  She states she "cannot hear" out of left ears  Also, Patient had health screening at work and her blood sugar after eating was 210  Her father has type 2 diabetes, grandparents were diabetic  The following portions of the patient's history were reviewed and updated as appropriate: allergies, current medications, past family history, past medical history, past social history, past surgical history and problem list     Review of Systems   Constitutional: Negative for activity change, appetite change, chills, fatigue and fever     HENT: Left ear "fullness" loss of hearing  Eyes: Negative  Respiratory: Negative for cough, chest tightness and shortness of breath  Cardiovascular: Negative  Gastrointestinal: Negative  Genitourinary: Negative  Musculoskeletal: Negative  Skin: Negative  Neurological: Negative for dizziness, syncope and headaches  Hematological: Negative  Objective:      /78 (BP Location: Left arm, Patient Position: Sitting, Cuff Size: Standard)   Pulse 72   Temp (!) 97 1 °F (36 2 °C) (Tympanic)   Resp 16   Ht 5' 6 61" (1 692 m)   Wt 69 7 kg (153 lb 9 6 oz)   SpO2 97%   BMI 24 34 kg/m²          Physical Exam   Constitutional: She is oriented to person, place, and time  She appears well-developed and well-nourished  HENT:   Head: Normocephalic and atraumatic  Mouth/Throat: Oropharynx is clear and moist    Right ear - clear, good cone of light  Left ear - bulging of TM, no erythema, No pain  Eyes: Conjunctivae and EOM are normal  Pupils are equal, round, and reactive to light  Neck: Normal range of motion  Neck supple  No thyromegaly present  Cardiovascular: Normal rate and regular rhythm  Pulmonary/Chest: Effort normal and breath sounds normal    Abdominal: Soft  Bowel sounds are normal    Musculoskeletal: Normal range of motion  Lymphadenopathy:     She has no cervical adenopathy  Neurological: She is alert and oriented to person, place, and time  Skin: Skin is warm and dry  Psychiatric: She has a normal mood and affect   Her behavior is normal  Judgment and thought content normal

## 2018-07-23 NOTE — PATIENT INSTRUCTIONS
You do have some bulging of left tympanic membrane  Continue flonase  Start Claritin and take on daily basis for next several weeks  Try OTC [place] nasal spray and use throughout day to "flush" eustachian tube  No signs or symptoms of infection on today's visit  Call office if you develop ear pain, fever, chills, - you may need an antibiotic at that time

## 2018-07-31 ENCOUNTER — HOSPITAL ENCOUNTER (OUTPATIENT)
Dept: MAMMOGRAPHY | Facility: MEDICAL CENTER | Age: 43
Discharge: HOME | End: 2018-07-31
Payer: COMMERCIAL

## 2018-07-31 DIAGNOSIS — Z12.31 ENCOUNTER FOR SCREENING MAMMOGRAM FOR MALIGNANT NEOPLASM OF BREAST: ICD-10-CM

## 2018-07-31 PROCEDURE — 77067 SCR MAMMO BI INCL CAD: CPT

## 2018-08-10 ENCOUNTER — PROCEDURE VISIT (OUTPATIENT)
Dept: GYNECOLOGY | Facility: CLINIC | Age: 43
End: 2018-08-10
Payer: COMMERCIAL

## 2018-08-10 VITALS
HEIGHT: 67 IN | BODY MASS INDEX: 24.08 KG/M2 | DIASTOLIC BLOOD PRESSURE: 86 MMHG | WEIGHT: 153.4 LBS | SYSTOLIC BLOOD PRESSURE: 125 MMHG

## 2018-08-10 DIAGNOSIS — N92.0 MENORRHAGIA WITH REGULAR CYCLE: ICD-10-CM

## 2018-08-10 DIAGNOSIS — R10.2 PELVIC PAIN: Primary | ICD-10-CM

## 2018-08-10 DIAGNOSIS — N84.0 ENDOMETRIAL POLYP: ICD-10-CM

## 2018-08-10 PROCEDURE — 88342 IMHCHEM/IMCYTCHM 1ST ANTB: CPT | Performed by: PATHOLOGY

## 2018-08-10 PROCEDURE — 88305 TISSUE EXAM BY PATHOLOGIST: CPT | Performed by: PATHOLOGY

## 2018-08-10 PROCEDURE — 58353 ENDOMETR ABLATE THERMAL: CPT | Performed by: OBSTETRICS & GYNECOLOGY

## 2018-08-10 RX ORDER — OXYCODONE HYDROCHLORIDE AND ACETAMINOPHEN 5; 325 MG/1; MG/1
1 TABLET ORAL EVERY 4 HOURS PRN
Qty: 6 TABLET | Refills: 0 | Status: SHIPPED | OUTPATIENT
Start: 2018-08-10 | End: 2019-07-08 | Stop reason: ALTCHOICE

## 2018-08-10 NOTE — PROGRESS NOTES
Endometrial ablation  Date/Time: 8/10/2018 8:33 AM  Performed by: Kelly Henderson by: Sally Parra     Consent:     Consent obtained:  Written    Consent given by:  Patient    Procedural risks discussed:  Bleeding, damage to other organs, failure rate, infection and possible conversion to open surgery    Patient questions answered: yes      Patient agrees, verbalizes understanding, and wants to proceed: yes      Educational handouts given: no      Instructions and paperwork completed: yes    Indication:     Indications: Excessive or frequent menstruation    Pre-procedure:     Negative urine pregnancy test: no (prior tubal)      Pre-procedure timeout performed: yes    Procedure:     Cervix cleaned and prepped: yes      Tenaculum applied to cervix: yes      Uterus sounded: yes      Uterus sound depth (cm):  7    Cervix dilated: yes      Cervix dilated with:  Hegar    Hysteroscopic guidance used: no      Uterine cavity curetted, scant material obtained and sent to pathology: yes      Uterine lining ablated: yes      Uterine lining ablation method:  Electricity    Ablation time (minutes):  2    Ablation time (seconds):  0    Probe removed and hemostasis verified: yes    Post-procedure:     Patient observed: yes      Patient observation time:  15 minutes    Estimated blood loss (mL):  2    Post procedure instructions given to patient: yes      Nothing in vagina for 2 weeks: yes      Patient tolerated procedure well with no complications: yes

## 2018-08-18 ENCOUNTER — APPOINTMENT (OUTPATIENT)
Dept: LAB | Age: 43
End: 2018-08-18
Payer: COMMERCIAL

## 2018-08-18 ENCOUNTER — TRANSCRIBE ORDERS (OUTPATIENT)
Dept: ADMINISTRATIVE | Age: 43
End: 2018-08-18

## 2018-08-18 DIAGNOSIS — R73.9 ELEVATED SERUM GLUCOSE: ICD-10-CM

## 2018-08-18 LAB
ANION GAP SERPL CALCULATED.3IONS-SCNC: 4 MMOL/L (ref 4–13)
BUN SERPL-MCNC: 13 MG/DL (ref 5–25)
CALCIUM SERPL-MCNC: 8.5 MG/DL (ref 8.3–10.1)
CHLORIDE SERPL-SCNC: 107 MMOL/L (ref 100–108)
CO2 SERPL-SCNC: 28 MMOL/L (ref 21–32)
CREAT SERPL-MCNC: 0.59 MG/DL (ref 0.6–1.3)
GFR SERPL CREATININE-BSD FRML MDRD: 113 ML/MIN/1.73SQ M
GLUCOSE P FAST SERPL-MCNC: 83 MG/DL (ref 65–99)
POTASSIUM SERPL-SCNC: 4.1 MMOL/L (ref 3.5–5.3)
SODIUM SERPL-SCNC: 139 MMOL/L (ref 136–145)

## 2018-08-18 PROCEDURE — 36415 COLL VENOUS BLD VENIPUNCTURE: CPT

## 2018-08-18 PROCEDURE — 80048 BASIC METABOLIC PNL TOTAL CA: CPT

## 2018-08-23 ENCOUNTER — OFFICE VISIT (OUTPATIENT)
Dept: GYNECOLOGY | Facility: CLINIC | Age: 43
End: 2018-08-23

## 2018-08-23 VITALS
WEIGHT: 155.6 LBS | DIASTOLIC BLOOD PRESSURE: 74 MMHG | HEIGHT: 67 IN | SYSTOLIC BLOOD PRESSURE: 98 MMHG | BODY MASS INDEX: 24.42 KG/M2

## 2018-08-23 DIAGNOSIS — Z48.89 POSTOPERATIVE VISIT: Primary | ICD-10-CM

## 2018-08-23 PROCEDURE — 99024 POSTOP FOLLOW-UP VISIT: CPT | Performed by: OBSTETRICS & GYNECOLOGY

## 2018-08-23 NOTE — PROGRESS NOTES
PO check  S/P hysteroscopy D & C ablation  Doing well   No c/o  Path: benign    PE: uterus and adnexa WNL    Plan: RTO prn/annual

## 2018-08-28 ENCOUNTER — OFFICE VISIT (OUTPATIENT)
Dept: BARIATRICS | Facility: CLINIC | Age: 43
End: 2018-08-28
Payer: COMMERCIAL

## 2018-08-28 VITALS
HEIGHT: 67 IN | WEIGHT: 157 LBS | BODY MASS INDEX: 24.64 KG/M2 | HEART RATE: 82 BPM | TEMPERATURE: 98.6 F | SYSTOLIC BLOOD PRESSURE: 100 MMHG | DIASTOLIC BLOOD PRESSURE: 68 MMHG

## 2018-08-28 DIAGNOSIS — K91.2 POSTSURGICAL MALABSORPTION: Primary | ICD-10-CM

## 2018-08-28 DIAGNOSIS — Z98.84 BARIATRIC SURGERY STATUS: ICD-10-CM

## 2018-08-28 PROBLEM — R93.89 ABNORMAL CT OF THE CHEST: Status: ACTIVE | Noted: 2017-06-23

## 2018-08-28 PROBLEM — D50.9 ANEMIA, IRON DEFICIENCY: Status: ACTIVE | Noted: 2017-05-30

## 2018-08-28 PROBLEM — J43.9 BULLA, LUNG (HCC): Status: ACTIVE | Noted: 2017-06-23

## 2018-08-28 PROCEDURE — 99213 OFFICE O/P EST LOW 20 MIN: CPT | Performed by: PHYSICIAN ASSISTANT

## 2018-08-28 NOTE — ASSESSMENT & PLAN NOTE
Malabsorption- patient is at risk for malabsorption of vitamins/minerals secondary to malabsorption from her procedure and restriction of intakes  Reviewed current supplements and advised on same    She is taking one regular multivitamin/mineral supplement with iron  And an extra 500 mcg sublingual vitamin B12  And 65 mg of iron twice daily  And takes 500 mg citrate with D  And 5000 IU vitamin D

## 2018-08-28 NOTE — PROGRESS NOTES
Assessment/Plan:    Bariatric surgery status  -s/p Marilee-En-Y Gastric Bypass with Dr Nohemi Blanco on 3/31/2014  Overall doing Well  Up 7 lb from last year which is fine/within ideal body weight range  She is planning on plastic surgery repair and breast augmentation later this year    Initial:258 5 lb  Current: 157 lb  EWL: 104%   Hrei: 149 lb-one year post-op  Current BMI is Body mass index is 24 37 kg/m²  Tolerating a regular diet-yes  Eating at least 60 grams of protein per day-yes  Following 30/60 minute rule with liquids-doesn't drink with meals for the most part but does have some-advised on trying to drink between but weight loss is excellent  Drinking at least 64 ounces of fluid per day-yes  Drinking carbonated beverages-no  Sufficient exercise-yes  Using NSAIDs regularly-no  Using nicotine-no  Using alcohol-yes-one glass of mixed drink with vodka-advised on effect after gastric surgery and advised to abstain and reviewed concerns      Postsurgical malabsorption  Malabsorption- patient is at risk for malabsorption of vitamins/minerals secondary to malabsorption from her procedure and restriction of intakes  Reviewed current supplements and advised on same    She is taking one regular multivitamin/mineral supplement with iron  And an extra 500 mcg sublingual vitamin B12  And 65 mg of iron twice daily  And takes 500 mg citrate with D  And 5000 IU vitamin D        Diagnoses and all orders for this visit:    Postsurgical malabsorption  -     CBC and Platelet; Future  -     Comprehensive metabolic panel; Future  -     Copper Level; Future  -     Ferritin; Future  -     Folate; Future  -     PTH, intact; Future  -     Vitamin A; Future  -     Vitamin B1, whole blood; Future  -     Vitamin B12; Future  -     Vitamin D 25 hydroxy; Future  -     Zinc; Future    Bariatric surgery status  -     CBC and Platelet; Future  -     Comprehensive metabolic panel; Future  -     Copper Level; Future  -     Ferritin;  Future  - Folate; Future  -     PTH, intact; Future  -     Vitamin A; Future  -     Vitamin B1, whole blood; Future  -     Vitamin B12; Future  -     Vitamin D 25 hydroxy; Future  -     Zinc; Future          Subjective:      Patient ID: Mary Braxton is a 37 y o  female  She is here in routine follow-up accompanied by her   She is tolerating a regular diet  Happy with her overall weight loss  She is taking regular vitamins and is exercising regularly  She plans to get plastic surgery for her excess skin and a breast augmentation at the end of the year  She has no complaints today        The following portions of the patient's history were reviewed and updated as appropriate: allergies, current medications, past family history, past medical history, past social history, past surgical history and problem list     Review of Systems   Constitutional: Negative for chills, fever and unexpected weight change (wieght is up a few pounds from last year)  Respiratory: Negative for shortness of breath and wheezing  Cardiovascular: Negative for chest pain and palpitations  Gastrointestinal: Negative for abdominal pain, constipation, diarrhea, nausea and vomiting  Psychiatric/Behavioral: Suicidal ideas: no complait of anxiety or depression  Objective:      /68 (BP Location: Left arm, Patient Position: Sitting, Cuff Size: Adult)   Pulse 82   Temp 98 6 °F (37 °C) (Tympanic)   Ht 5' 7 3" (1 709 m)   Wt 71 2 kg (157 lb)   BMI 24 37 kg/m²          Physical Exam   Constitutional: She is oriented to person, place, and time  She appears well-developed and well-nourished  HENT:   Mouth/Throat: Oropharynx is clear and moist    Eyes: Conjunctivae are normal  No scleral icterus  Cardiovascular: Normal rate, regular rhythm and normal heart sounds  Pulmonary/Chest: Effort normal and breath sounds normal    Abdominal: Soft  There is no tenderness     No incisional hernias appreciated   Musculoskeletal: Normal gait   Neurological: She is alert and oriented to person, place, and time  Psychiatric: She has a normal mood and affect  Her behavior is normal  Judgment and thought content normal    Nursing note and vitals reviewed        Maintain weight loss with good nutrition intakes  Normal vitamin and mineral levels  Exercise as tolerated

## 2018-08-28 NOTE — ASSESSMENT & PLAN NOTE
-s/p Marilee-En-Y Gastric Bypass with Dr Wendy Woods on 3/31/2014  Overall doing Well  Up 7 lb from last year which is fine/within ideal body weight range  She is planning on plastic surgery repair and breast augmentation later this year    Initial:258 5 lb  Current: 157 lb  EWL: 104%   Heri: 149 lb-one year post-op  Current BMI is Body mass index is 24 37 kg/m²      Tolerating a regular diet-yes  Eating at least 60 grams of protein per day-yes  Following 30/60 minute rule with liquids-doesn't drink with meals for the most part but does have some-advised on trying to drink between but weight loss is excellent  Drinking at least 64 ounces of fluid per day-yes  Drinking carbonated beverages-no  Sufficient exercise-yes  Using NSAIDs regularly-no  Using nicotine-no  Using alcohol-yes-one glass of mixed drink with vodka-advised on effect after gastric surgery and advised to abstain and reviewed concerns

## 2018-08-28 NOTE — PATIENT INSTRUCTIONS
Follow-up in one year  We kindly ask that you arrive 15 minutes before your scheduled appointment time with your provider to allow you to be roomed, have your vital signs checked and your chart updated by our staff  We thank you for your patience at your visit  Follow diet as discussed  Follow  vitamin and mineral recommendations as reviewed with you  Exercise as tolerated    If you have gotten a lab slip at this visit, please note that most labs are FASTING-but you need to drink water the night before and the morning before your labs are done  It is HIGHLY RECOMMENDED that you check with your insurance to make sure all the labs ordered are covered by your individual insurance policy  This is especially important if you also get labs done by other providers outside of [facility]  You want to avoid having duplicate labs done  Note you will be given a lab slip AFTER  your annual visit next year to check your vitamin and mineral levels  You will not need to make a second appointment after the labs are received/reviewed  You will receive a letter/and or phone call with your results  Most labs do NOT honor a lab slip dated one year in advance now  Call our office if he have any problems with abdominal pain especially if associated with fever, chills, nausea, vomiting or any other concerns  All  Post-bariatric surgery patients should be aware that very small quantities of any alcohol  can cause impairment and it is very possible not to feel the effect  The effect can be in the system for several hours  It is also a stomach irritant  It is advised to AVOID alcohol, Nonsteroidal antiinflammatory drugs (NSAIDS) and nicotine of all forms   Any of these can cause stomach irritation/pain

## 2018-09-06 ENCOUNTER — DOCTOR'S OFFICE (OUTPATIENT)
Dept: URBAN - METROPOLITAN AREA CLINIC 136 | Facility: CLINIC | Age: 43
Setting detail: OPHTHALMOLOGY
End: 2018-09-06
Payer: COMMERCIAL

## 2018-09-06 DIAGNOSIS — H40.033: ICD-10-CM

## 2018-09-06 PROCEDURE — 92132 CPTRZD OPH DX IMG ANT SGM: CPT | Performed by: OPHTHALMOLOGY

## 2018-09-06 PROCEDURE — 92012 INTRM OPH EXAM EST PATIENT: CPT | Performed by: OPHTHALMOLOGY

## 2018-09-06 ASSESSMENT — REFRACTION_AUTOREFRACTION
OD_CYLINDER: -1.00
OS_SPHERE: -0.25
OD_SPHERE: +0.25
OS_CYLINDER: -0.75
OD_AXIS: 144
OS_AXIS: 111

## 2018-09-06 ASSESSMENT — REFRACTION_MANIFEST
OD_VA2: 20/
OS_VA2: 20/
OS_VA3: 20/
OD_VA2: 20/
OS_VA3: 20/
OD_VA3: 20/
OD_VA1: 20/
OS_VA1: 20/20
OS_VA2: 20/
OS_ADD: +1.25
OS_VA1: 20/
OD_VA3: 20/
OD_SPHERE: PLANO
OS_SPHERE: PLANO
OU_VA: 20/
OD_ADD: +1.25
OD_VA1: 20/20
OU_VA: 20/

## 2018-09-06 ASSESSMENT — CONFRONTATIONAL VISUAL FIELD TEST (CVF)
OS_FINDINGS: FULL
OD_FINDINGS: FULL

## 2018-09-06 ASSESSMENT — REFRACTION_CURRENTRX
OD_OVR_VA: 20/
OS_OVR_VA: 20/
OS_OVR_VA: 20/
OS_VPRISM_DIRECTION: BF
OD_ADD: +1.00
OS_OVR_VA: 20/
OD_OVR_VA: 20/
OS_SPHERE: PLANO
OD_OVR_VA: 20/
OD_VPRISM_DIRECTION: BF
OS_ADD: +1.00
OD_SPHERE: PLANO

## 2018-09-06 ASSESSMENT — VISUAL ACUITY
OD_BCVA: 20/20
OS_BCVA: 20/20

## 2018-09-06 ASSESSMENT — SPHEQUIV_DERIVED
OS_SPHEQUIV: -0.625
OD_SPHEQUIV: -0.25

## 2018-09-22 ENCOUNTER — APPOINTMENT (OUTPATIENT)
Dept: LAB | Age: 43
End: 2018-09-22
Payer: COMMERCIAL

## 2018-09-22 DIAGNOSIS — Z98.84 BARIATRIC SURGERY STATUS: ICD-10-CM

## 2018-09-22 DIAGNOSIS — K91.2 POSTSURGICAL MALABSORPTION: ICD-10-CM

## 2018-09-22 LAB
25(OH)D3 SERPL-MCNC: 28.4 NG/ML (ref 30–100)
ALBUMIN SERPL BCP-MCNC: 3.5 G/DL (ref 3.5–5)
ALP SERPL-CCNC: 76 U/L (ref 46–116)
ALT SERPL W P-5'-P-CCNC: 18 U/L (ref 12–78)
ANION GAP SERPL CALCULATED.3IONS-SCNC: 5 MMOL/L (ref 4–13)
AST SERPL W P-5'-P-CCNC: 12 U/L (ref 5–45)
BILIRUB SERPL-MCNC: 0.76 MG/DL (ref 0.2–1)
BUN SERPL-MCNC: 13 MG/DL (ref 5–25)
CALCIUM SERPL-MCNC: 8.3 MG/DL (ref 8.3–10.1)
CHLORIDE SERPL-SCNC: 105 MMOL/L (ref 100–108)
CO2 SERPL-SCNC: 28 MMOL/L (ref 21–32)
CREAT SERPL-MCNC: 0.66 MG/DL (ref 0.6–1.3)
ERYTHROCYTE [DISTWIDTH] IN BLOOD BY AUTOMATED COUNT: 14.2 % (ref 11.6–15.1)
FERRITIN SERPL-MCNC: 5 NG/ML (ref 8–388)
FOLATE SERPL-MCNC: 16.1 NG/ML (ref 3.1–17.5)
GFR SERPL CREATININE-BSD FRML MDRD: 109 ML/MIN/1.73SQ M
GLUCOSE P FAST SERPL-MCNC: 87 MG/DL (ref 65–99)
HCT VFR BLD AUTO: 42 % (ref 34.8–46.1)
HGB BLD-MCNC: 13.4 G/DL (ref 11.5–15.4)
MCH RBC QN AUTO: 28.3 PG (ref 26.8–34.3)
MCHC RBC AUTO-ENTMCNC: 31.9 G/DL (ref 31.4–37.4)
MCV RBC AUTO: 89 FL (ref 82–98)
PLATELET # BLD AUTO: 244 THOUSANDS/UL (ref 149–390)
PMV BLD AUTO: 10.7 FL (ref 8.9–12.7)
POTASSIUM SERPL-SCNC: 4.5 MMOL/L (ref 3.5–5.3)
PROT SERPL-MCNC: 6.6 G/DL (ref 6.4–8.2)
PTH-INTACT SERPL-MCNC: 42.2 PG/ML (ref 18.4–80.1)
RBC # BLD AUTO: 4.73 MILLION/UL (ref 3.81–5.12)
SODIUM SERPL-SCNC: 138 MMOL/L (ref 136–145)
VIT B12 SERPL-MCNC: 1226 PG/ML (ref 100–900)
WBC # BLD AUTO: 5.15 THOUSAND/UL (ref 4.31–10.16)

## 2018-09-22 PROCEDURE — 83970 ASSAY OF PARATHORMONE: CPT

## 2018-09-22 PROCEDURE — 80053 COMPREHEN METABOLIC PANEL: CPT

## 2018-09-22 PROCEDURE — 84425 ASSAY OF VITAMIN B-1: CPT

## 2018-09-22 PROCEDURE — 36415 COLL VENOUS BLD VENIPUNCTURE: CPT

## 2018-09-22 PROCEDURE — 84590 ASSAY OF VITAMIN A: CPT

## 2018-09-22 PROCEDURE — 82525 ASSAY OF COPPER: CPT

## 2018-09-22 PROCEDURE — 82728 ASSAY OF FERRITIN: CPT

## 2018-09-22 PROCEDURE — 82306 VITAMIN D 25 HYDROXY: CPT

## 2018-09-22 PROCEDURE — 84630 ASSAY OF ZINC: CPT

## 2018-09-22 PROCEDURE — 82607 VITAMIN B-12: CPT

## 2018-09-22 PROCEDURE — 82746 ASSAY OF FOLIC ACID SERUM: CPT

## 2018-09-22 PROCEDURE — 85027 COMPLETE CBC AUTOMATED: CPT

## 2018-09-25 LAB
COPPER SERPL-MCNC: 86 UG/DL (ref 72–166)
VIT A SERPL-MCNC: 24.1 UG/DL (ref 33.1–100)
ZINC SERPL-MCNC: 68 UG/DL (ref 56–134)

## 2018-09-26 LAB — VIT B1 BLD-SCNC: 138.9 NMOL/L (ref 66.5–200)

## 2018-09-27 DIAGNOSIS — R79.89 LOW SERUM VITAMIN A: ICD-10-CM

## 2018-09-27 DIAGNOSIS — E61.1 IRON DEFICIENCY: ICD-10-CM

## 2018-09-27 DIAGNOSIS — K91.2 POSTSURGICAL MALABSORPTION: Primary | ICD-10-CM

## 2018-09-27 DIAGNOSIS — Z98.84 BARIATRIC SURGERY STATUS: ICD-10-CM

## 2018-10-08 ENCOUNTER — OFFICE VISIT (OUTPATIENT)
Dept: GYNECOLOGY | Facility: CLINIC | Age: 43
End: 2018-10-08
Payer: COMMERCIAL

## 2018-10-08 VITALS
BODY MASS INDEX: 24.48 KG/M2 | SYSTOLIC BLOOD PRESSURE: 116 MMHG | DIASTOLIC BLOOD PRESSURE: 78 MMHG | HEIGHT: 67 IN | WEIGHT: 156 LBS

## 2018-10-08 DIAGNOSIS — N63.0 BREAST MASS: Primary | ICD-10-CM

## 2018-10-08 PROCEDURE — 99213 OFFICE O/P EST LOW 20 MIN: CPT | Performed by: NURSE PRACTITIONER

## 2018-10-08 NOTE — PROGRESS NOTES
Assessment/Plan:    Left Breast lump     Diagnoses and all orders for this visit:    Breast mass  -     US breast left limited (diagnostic); Future          Subjective: The pt  presents today c/o a tender left breast lump     Patient ID: Saintclair Flow is a 37 y o  female  HPI  The pt   presents today c/o a tender left breast lump since Friday  She states that she just noticed this "lump" on Friday because it was tender and burning    She does feel that it has increased in size over the weekend  She states that her period just ended  She has not used anything to treat this  There is no FH of breast cancer  She did have a normal MMG 7/2018  She did call Dr Branden Palomo on Sunday and was told to be seen in the office today  She presents for evaluation  The following portions of the patient's history were reviewed and updated as appropriate: current medications, past family history, past medical history, past social history, past surgical history and problem list     Review of Systems   Constitutional: Negative  Genitourinary:        Reports left breast mass as noted in HPI   Psychiatric/Behavioral: The patient is nervous/anxious  Objective:      /78   Ht 5' 7" (1 702 m)   Wt 70 8 kg (156 lb)   BMI 24 43 kg/m²          Physical Exam   Constitutional: She is oriented to person, place, and time  She appears well-developed and well-nourished  Pulmonary/Chest: Right breast exhibits no inverted nipple, no mass, no nipple discharge, no skin change and no tenderness  Left breast exhibits mass and tenderness  Left breast exhibits no inverted nipple, no nipple discharge and no skin change  Left breast -- between the  1 - 2 o'clock area there is a cluster of 2 -3 cyst like masses which are movable and tender to touch  Neurological: She is alert and oriented to person, place, and time  Psychiatric: She has a normal mood and affect   Her behavior is normal        PLAN: We discussed her sx , her concerns and today's findings  She has been advised to have a left breast diagnostic US done and this has been scheduled for tomorrow at [place] [id number]  at 8:30 AM  Meanwhile she can try the use of Tylenol and warm soaks tonight for comfort

## 2018-10-09 ENCOUNTER — HOSPITAL ENCOUNTER (OUTPATIENT)
Dept: MAMMOGRAPHY | Facility: CLINIC | Age: 43
Discharge: HOME | End: 2018-10-09
Payer: COMMERCIAL

## 2018-10-09 ENCOUNTER — HOSPITAL ENCOUNTER (OUTPATIENT)
Dept: ULTRASOUND IMAGING | Facility: CLINIC | Age: 43
Discharge: HOME | End: 2018-10-09
Payer: COMMERCIAL

## 2018-10-09 DIAGNOSIS — N63.0 BREAST MASS: ICD-10-CM

## 2018-10-09 PROCEDURE — 77065 DX MAMMO INCL CAD UNI: CPT

## 2018-10-09 PROCEDURE — 76642 ULTRASOUND BREAST LIMITED: CPT

## 2018-10-09 PROCEDURE — G0279 TOMOSYNTHESIS, MAMMO: HCPCS

## 2018-10-09 NOTE — PROGRESS NOTES
Met with patient and Dr Raymundo Seay regarding recommendation for;      _____ RIGHT ___x___LEFT      __x___Ultrasound guided  ______Stereotactic  Breast biopsy  __x___Verbalized understanding        Blood thinners:  _____yes __x___no    Date stopped: ____n/a_______    Biopsy teaching sheet given:  ___x____yes ______no

## 2018-10-12 ENCOUNTER — HOSPITAL ENCOUNTER (OUTPATIENT)
Dept: ULTRASOUND IMAGING | Facility: CLINIC | Age: 43
Discharge: HOME | End: 2018-10-12
Admitting: RADIOLOGY
Payer: COMMERCIAL

## 2018-10-12 ENCOUNTER — HOSPITAL ENCOUNTER (OUTPATIENT)
Dept: MAMMOGRAPHY | Facility: CLINIC | Age: 43
Discharge: HOME | End: 2018-10-12

## 2018-10-12 ENCOUNTER — HOSPITAL ENCOUNTER (OUTPATIENT)
Dept: ULTRASOUND IMAGING | Facility: CLINIC | Age: 43
Discharge: HOME | End: 2018-10-12
Payer: COMMERCIAL

## 2018-10-12 VITALS — SYSTOLIC BLOOD PRESSURE: 114 MMHG | HEART RATE: 64 BPM | DIASTOLIC BLOOD PRESSURE: 84 MMHG

## 2018-10-12 DIAGNOSIS — R92.8 ABNORMAL MAMMOGRAM: ICD-10-CM

## 2018-10-12 DIAGNOSIS — R92.8 ABNORMAL ULTRASOUND OF BREAST: ICD-10-CM

## 2018-10-12 PROCEDURE — 88341 IMHCHEM/IMCYTCHM EA ADD ANTB: CPT | Performed by: PATHOLOGY

## 2018-10-12 PROCEDURE — 88342 IMHCHEM/IMCYTCHM 1ST ANTB: CPT | Performed by: PATHOLOGY

## 2018-10-12 PROCEDURE — 19084 BX BREAST ADD LESION US IMAG: CPT

## 2018-10-12 PROCEDURE — 88305 TISSUE EXAM BY PATHOLOGIST: CPT | Performed by: PATHOLOGY

## 2018-10-12 PROCEDURE — 19083 BX BREAST 1ST LESION US IMAG: CPT

## 2018-10-12 RX ORDER — LIDOCAINE HYDROCHLORIDE 10 MG/ML
5 INJECTION, SOLUTION INFILTRATION; PERINEURAL ONCE
Status: COMPLETED | OUTPATIENT
Start: 2018-10-12 | End: 2018-10-12

## 2018-10-12 RX ADMIN — LIDOCAINE HYDROCHLORIDE 5 ML: 10 INJECTION, SOLUTION INFILTRATION; PERINEURAL at 14:26

## 2018-10-12 RX ADMIN — LIDOCAINE HYDROCHLORIDE 5 ML: 10 INJECTION, SOLUTION INFILTRATION; PERINEURAL at 14:35

## 2018-10-12 NOTE — PROGRESS NOTES
Procedure type:    _____ultrasound guided _____stereotactic    Breast:    __x___Left _____Right    Location: 900 [id number]    Needle: heena santana    # of passes: 2    Clip:Ribbon    Performed by: Dr Lucina Edwards held for 5 minutes by: Alva Law Strips:    __x___yes _____no    Band aid:    __x___yes_____no    Tape and guaze:    _____yes ___x__no    Tolerated procedure:    ___x__yes _____no

## 2018-10-12 NOTE — PROGRESS NOTES
Ice pack given:    __x___yes _____no    Discharge instructions signed by patient:    ___x__yes _____no    Discharge instructions given to patient:    __x___yes _____no    Discharged via:    __x___amulatory    _____wheelchair    _____stretcher    Stable on discharge:    ___x__yes ____no    POST LARGE CORE BREAST BIOPSY PATIENT INFORMATION      1  Place an ice pack inside your bra over the top of the dressing every hour for 20 minutes (20 minutes on, 60 minutes off)  Do this until bedtime  2  Do not shower or bathe until the following morning  3  You may bathe your breast carefully with the steri-strips in place  Be careful    Not to loosen them  The steri-strips will fall off in 3-5 days  4  You may have mild discomfort, and you may have some bruising where the   Needle entered the skin  This should clear within 5-7 days  5  If you need medicine for discomfort, take acetaminophen products such as   Tylenol  You may also take Advil or Motrin products  6  Do not participate in strenuous activities such as-tennis, aerobics, skiing,  Weight lifting, etc  for 24 hours  Refrain from swimming/soaking for 72 hours  7  Wearing a bra for sleeping may be more comfortable for the first 24-48 hours  8  Watch for continued bleeding, pain or fever over 101; please call with any questions or concerns  For procedures done at the [facility] call:  Savannah Ramirez RN at [phone]  Js Moore RN at [phone]                    *After 4 PM call the Interventional Radiology Department                    [phone] and ask to speak with the nurse on call  For procedures done at the [address] [place] [address] call:         Oma Howe RN at   *After 4 PM call the Interventional Radiology Department   [phone] and ask to speak with the nurse on call  For procedures done at [address] [place] [address] call:   The Radiology Nurse at [phone]  *After 4 PM call your physician, or go to the Emergency Department  9          The final results of your biopsy are usually available within one week

## 2018-10-12 NOTE — PROGRESS NOTES
Procedure type:    ___x__ultrasound guided _____stereotactic    Breast:    __x___Left _____Right    Location: 200 7cmfn    Needle: 12ga max    # of passes: 2    Clip: wing    Performed by: Dr Branden Medrano held for 5 minutes by: Dr Marita Grey    Steri Strips:    __x___yes _____no    Band aid:    __x___yes_____no    Tape and guaze:    _____yes __x___no    Tolerated procedure:    __x___yes _____no

## 2018-10-16 NOTE — PROGRESS NOTES
Post procedure call completed 10/16/18 at 08:25    Bleeding: _____yes _x___no    Pain: _____yes ___x___no    Redness/Swelling: ______yes __x____no    Band aid removed: _x____yes _____no    Steri-Strips intact: ___x___yes _____no

## 2018-10-17 ENCOUNTER — TELEPHONE (OUTPATIENT)
Dept: MAMMOGRAPHY | Facility: CLINIC | Age: 43
End: 2018-10-17

## 2018-11-06 ENCOUNTER — AMBUL SURGICAL CARE (OUTPATIENT)
Dept: URBAN - METROPOLITAN AREA SURGERY 32 | Facility: SURGERY | Age: 43
Setting detail: OPHTHALMOLOGY
End: 2018-11-06
Payer: COMMERCIAL

## 2018-11-06 DIAGNOSIS — H40.032: ICD-10-CM

## 2018-11-06 PROCEDURE — G8918 PT W/O PREOP ORDER IV AB PRO: HCPCS | Performed by: OPHTHALMOLOGY

## 2018-11-06 PROCEDURE — G8907 PT DOC NO EVENTS ON DISCHARG: HCPCS | Performed by: OPHTHALMOLOGY

## 2018-11-06 PROCEDURE — 66761 REVISION OF IRIS: CPT | Performed by: OPHTHALMOLOGY

## 2018-11-20 ENCOUNTER — AMBUL SURGICAL CARE (OUTPATIENT)
Dept: URBAN - METROPOLITAN AREA SURGERY 32 | Facility: SURGERY | Age: 43
Setting detail: OPHTHALMOLOGY
End: 2018-11-20
Payer: COMMERCIAL

## 2018-11-20 DIAGNOSIS — H40.031: ICD-10-CM

## 2018-11-20 PROCEDURE — G8918 PT W/O PREOP ORDER IV AB PRO: HCPCS | Performed by: OPHTHALMOLOGY

## 2018-11-20 PROCEDURE — G8907 PT DOC NO EVENTS ON DISCHARG: HCPCS | Performed by: OPHTHALMOLOGY

## 2018-11-20 PROCEDURE — 66761 REVISION OF IRIS: CPT | Performed by: OPHTHALMOLOGY

## 2018-12-09 ENCOUNTER — APPOINTMENT (OUTPATIENT)
Dept: LAB | Age: 43
End: 2018-12-09
Payer: COMMERCIAL

## 2018-12-09 ENCOUNTER — TRANSCRIBE ORDERS (OUTPATIENT)
Dept: ADMINISTRATIVE | Age: 43
End: 2018-12-09

## 2018-12-09 DIAGNOSIS — Z01.818 PREOP EXAMINATION: ICD-10-CM

## 2018-12-09 DIAGNOSIS — Z01.818 PREOP EXAMINATION: Primary | ICD-10-CM

## 2018-12-09 LAB
ANION GAP SERPL CALCULATED.3IONS-SCNC: 6 MMOL/L (ref 4–13)
BUN SERPL-MCNC: 17 MG/DL (ref 5–25)
CALCIUM SERPL-MCNC: 7.8 MG/DL (ref 8.3–10.1)
CHLORIDE SERPL-SCNC: 108 MMOL/L (ref 100–108)
CO2 SERPL-SCNC: 27 MMOL/L (ref 21–32)
CREAT SERPL-MCNC: 0.59 MG/DL (ref 0.6–1.3)
ERYTHROCYTE [DISTWIDTH] IN BLOOD BY AUTOMATED COUNT: 13.8 % (ref 11.6–15.1)
GFR SERPL CREATININE-BSD FRML MDRD: 113 ML/MIN/1.73SQ M
GLUCOSE SERPL-MCNC: 84 MG/DL (ref 65–140)
HCT VFR BLD AUTO: 40 % (ref 34.8–46.1)
HGB BLD-MCNC: 12.8 G/DL (ref 11.5–15.4)
MCH RBC QN AUTO: 29.2 PG (ref 26.8–34.3)
MCHC RBC AUTO-ENTMCNC: 32 G/DL (ref 31.4–37.4)
MCV RBC AUTO: 91 FL (ref 82–98)
PLATELET # BLD AUTO: 196 THOUSANDS/UL (ref 149–390)
PMV BLD AUTO: 10.7 FL (ref 8.9–12.7)
POTASSIUM SERPL-SCNC: 3.8 MMOL/L (ref 3.5–5.3)
RBC # BLD AUTO: 4.39 MILLION/UL (ref 3.81–5.12)
SODIUM SERPL-SCNC: 141 MMOL/L (ref 136–145)
WBC # BLD AUTO: 5.34 THOUSAND/UL (ref 4.31–10.16)

## 2018-12-09 PROCEDURE — 85027 COMPLETE CBC AUTOMATED: CPT

## 2018-12-09 PROCEDURE — 36415 COLL VENOUS BLD VENIPUNCTURE: CPT

## 2018-12-09 PROCEDURE — 80048 BASIC METABOLIC PNL TOTAL CA: CPT

## 2019-01-11 ENCOUNTER — DOCTOR'S OFFICE (OUTPATIENT)
Dept: URBAN - METROPOLITAN AREA CLINIC 136 | Facility: CLINIC | Age: 44
Setting detail: OPHTHALMOLOGY
End: 2019-01-11
Payer: COMMERCIAL

## 2019-01-11 DIAGNOSIS — Z83.511: ICD-10-CM

## 2019-01-11 DIAGNOSIS — H40.033: ICD-10-CM

## 2019-01-11 PROCEDURE — 92020 GONIOSCOPY: CPT | Performed by: OPHTHALMOLOGY

## 2019-01-11 PROCEDURE — 92014 COMPRE OPH EXAM EST PT 1/>: CPT | Performed by: OPHTHALMOLOGY

## 2019-01-11 ASSESSMENT — REFRACTION_MANIFEST
OS_VA1: 20/
OD_VA3: 20/
OS_VA2: 20/
OD_VA2: 20/
OD_VA3: 20/
OD_VA2: 20/
OU_VA: 20/
OS_ADD: +1.25
OD_VA1: 20/
OD_SPHERE: PLANO
OS_SPHERE: PLANO
OS_VA1: 20/20
OD_VA1: 20/20
OD_ADD: +1.25
OS_VA3: 20/
OS_VA2: 20/
OS_VA3: 20/
OU_VA: 20/

## 2019-01-11 ASSESSMENT — REFRACTION_AUTOREFRACTION
OS_SPHERE: -0.25
OD_AXIS: 144
OS_AXIS: 111
OD_CYLINDER: -1.00
OD_SPHERE: +0.25
OS_CYLINDER: -0.75

## 2019-01-11 ASSESSMENT — CONFRONTATIONAL VISUAL FIELD TEST (CVF)
OD_FINDINGS: FULL
OS_FINDINGS: FULL

## 2019-01-11 ASSESSMENT — SPHEQUIV_DERIVED
OS_SPHEQUIV: -0.625
OD_SPHEQUIV: -0.25

## 2019-01-11 ASSESSMENT — REFRACTION_CURRENTRX
OD_SPHERE: PLANO
OD_VPRISM_DIRECTION: BF
OD_ADD: +1.00
OS_SPHERE: PLANO
OD_OVR_VA: 20/
OS_VPRISM_DIRECTION: BF
OS_OVR_VA: 20/
OS_OVR_VA: 20/
OD_OVR_VA: 20/
OS_ADD: +1.00
OS_OVR_VA: 20/
OD_OVR_VA: 20/

## 2019-01-11 ASSESSMENT — VISUAL ACUITY
OS_BCVA: 20/20
OD_BCVA: 20/20

## 2019-01-29 ENCOUNTER — DOCTOR'S OFFICE (OUTPATIENT)
Dept: URBAN - METROPOLITAN AREA CLINIC 136 | Facility: CLINIC | Age: 44
Setting detail: OPHTHALMOLOGY
End: 2019-01-29
Payer: COMMERCIAL

## 2019-01-29 DIAGNOSIS — H43.813: ICD-10-CM

## 2019-01-29 DIAGNOSIS — H40.033: ICD-10-CM

## 2019-01-29 DIAGNOSIS — Z83.511: ICD-10-CM

## 2019-01-29 DIAGNOSIS — H52.4: ICD-10-CM

## 2019-01-29 DIAGNOSIS — H04.123: ICD-10-CM

## 2019-01-29 PROCEDURE — 92012 INTRM OPH EXAM EST PATIENT: CPT | Performed by: OPHTHALMOLOGY

## 2019-01-29 ASSESSMENT — REFRACTION_CURRENTRX
OS_OVR_VA: 20/
OD_VPRISM_DIRECTION: BF
OD_ADD: +1.25
OD_SPHERE: PLANO
OS_SPHERE: PLANO
OD_OVR_VA: 20/
OS_VPRISM_DIRECTION: BF
OS_OVR_VA: 20/
OS_OVR_VA: 20/
OD_OVR_VA: 20/
OS_ADD: +1.25
OD_OVR_VA: 20/

## 2019-01-29 ASSESSMENT — REFRACTION_MANIFEST
OS_SPHERE: PLANO
OD_VA2: 20/
OD_VA3: 20/
OD_VA2: 20/
OD_VA1: 20/20
OS_VA2: 20/
OS_ADD: +1.25
OS_VA1: 20/20
OS_VA3: 20/
OD_SPHERE: PLANO
OS_VA3: 20/
OU_VA: 20/
OU_VA: 20/
OD_VA1: 20/
OS_VA2: 20/
OD_ADD: +1.25
OS_VA1: 20/
OD_VA3: 20/

## 2019-01-29 ASSESSMENT — CONFRONTATIONAL VISUAL FIELD TEST (CVF)
OD_FINDINGS: FULL
OS_FINDINGS: FULL

## 2019-01-29 ASSESSMENT — VISUAL ACUITY
OD_BCVA: 20/20
OS_BCVA: 20/20

## 2019-01-29 ASSESSMENT — SPHEQUIV_DERIVED
OD_SPHEQUIV: 1.25
OS_SPHEQUIV: 0.625

## 2019-01-29 ASSESSMENT — REFRACTION_AUTOREFRACTION
OS_AXIS: 077
OS_SPHERE: +1.00
OS_CYLINDER: -0.75
OD_CYLINDER: -1.00
OD_AXIS: 161
OD_SPHERE: +1.75

## 2019-04-15 ENCOUNTER — HOSPITAL ENCOUNTER (OUTPATIENT)
Dept: ULTRASOUND IMAGING | Facility: CLINIC | Age: 44
Discharge: HOME | End: 2019-04-15
Payer: COMMERCIAL

## 2019-04-15 VITALS — WEIGHT: 156 LBS | HEIGHT: 67 IN | BODY MASS INDEX: 24.48 KG/M2

## 2019-04-15 DIAGNOSIS — R92.8 ABNORMAL MAMMOGRAM: ICD-10-CM

## 2019-04-15 PROCEDURE — 76642 ULTRASOUND BREAST LIMITED: CPT

## 2019-04-16 DIAGNOSIS — N63.0 BREAST MASS: Primary | ICD-10-CM

## 2019-04-25 ENCOUNTER — ANNUAL EXAM (OUTPATIENT)
Dept: GYNECOLOGY | Facility: CLINIC | Age: 44
End: 2019-04-25
Payer: COMMERCIAL

## 2019-04-25 VITALS
BODY MASS INDEX: 25.62 KG/M2 | DIASTOLIC BLOOD PRESSURE: 88 MMHG | WEIGHT: 159.4 LBS | HEIGHT: 66 IN | SYSTOLIC BLOOD PRESSURE: 118 MMHG

## 2019-04-25 DIAGNOSIS — N94.6 DYSMENORRHEA: ICD-10-CM

## 2019-04-25 DIAGNOSIS — N80.0 ADENOMYOSIS: ICD-10-CM

## 2019-04-25 DIAGNOSIS — Z12.4 ENCOUNTER FOR PAPANICOLAOU SMEAR FOR CERVICAL CANCER SCREENING: Primary | ICD-10-CM

## 2019-04-25 PROCEDURE — G0145 SCR C/V CYTO,THINLAYER,RESCR: HCPCS | Performed by: OBSTETRICS & GYNECOLOGY

## 2019-04-25 PROCEDURE — S0612 ANNUAL GYNECOLOGICAL EXAMINA: HCPCS | Performed by: OBSTETRICS & GYNECOLOGY

## 2019-04-30 LAB
LAB AP GYN PRIMARY INTERPRETATION: NORMAL
Lab: NORMAL

## 2019-05-16 ENCOUNTER — OFFICE VISIT (OUTPATIENT)
Dept: INTERNAL MEDICINE CLINIC | Age: 44
End: 2019-05-16
Payer: COMMERCIAL

## 2019-05-16 VITALS
HEART RATE: 79 BPM | SYSTOLIC BLOOD PRESSURE: 110 MMHG | WEIGHT: 158.4 LBS | OXYGEN SATURATION: 97 % | BODY MASS INDEX: 25.57 KG/M2 | DIASTOLIC BLOOD PRESSURE: 78 MMHG | TEMPERATURE: 98.3 F

## 2019-05-16 DIAGNOSIS — H53.142 PHOTOPHOBIA OF LEFT EYE: ICD-10-CM

## 2019-05-16 DIAGNOSIS — Z87.74 HISTORY OF ARTERIOVENOUS MALFORMATION (AVM): ICD-10-CM

## 2019-05-16 DIAGNOSIS — Z01.812 ENCOUNTER FOR PRE-OPERATIVE LABORATORY TESTING: Primary | ICD-10-CM

## 2019-05-16 DIAGNOSIS — Z23 ENCOUNTER FOR IMMUNIZATION: ICD-10-CM

## 2019-05-16 DIAGNOSIS — R51.9 NEW ONSET OF HEADACHES: Primary | ICD-10-CM

## 2019-05-16 PROCEDURE — 99214 OFFICE O/P EST MOD 30 MIN: CPT | Performed by: PHYSICIAN ASSISTANT

## 2019-05-24 ENCOUNTER — TELEPHONE (OUTPATIENT)
Dept: INTERNAL MEDICINE CLINIC | Age: 44
End: 2019-05-24

## 2019-05-26 ENCOUNTER — APPOINTMENT (OUTPATIENT)
Dept: LAB | Age: 44
End: 2019-05-26
Payer: COMMERCIAL

## 2019-05-26 DIAGNOSIS — Z98.84 BARIATRIC SURGERY STATUS: ICD-10-CM

## 2019-05-26 DIAGNOSIS — Z87.74 HISTORY OF ARTERIOVENOUS MALFORMATION (AVM): ICD-10-CM

## 2019-05-26 DIAGNOSIS — R51.9 NEW ONSET OF HEADACHES: ICD-10-CM

## 2019-05-26 DIAGNOSIS — E61.1 IRON DEFICIENCY: ICD-10-CM

## 2019-05-26 DIAGNOSIS — R79.89 LOW SERUM VITAMIN A: ICD-10-CM

## 2019-05-26 DIAGNOSIS — H53.142 PHOTOPHOBIA OF LEFT EYE: ICD-10-CM

## 2019-05-26 DIAGNOSIS — K91.2 POSTSURGICAL MALABSORPTION: ICD-10-CM

## 2019-05-26 LAB
ALBUMIN SERPL BCP-MCNC: 3.8 G/DL (ref 3.5–5)
ALP SERPL-CCNC: 77 U/L (ref 46–116)
ALT SERPL W P-5'-P-CCNC: 16 U/L (ref 12–78)
ANION GAP SERPL CALCULATED.3IONS-SCNC: 3 MMOL/L (ref 4–13)
AST SERPL W P-5'-P-CCNC: 14 U/L (ref 5–45)
BASOPHILS # BLD AUTO: 0.05 THOUSANDS/ΜL (ref 0–0.1)
BASOPHILS NFR BLD AUTO: 1 % (ref 0–1)
BILIRUB SERPL-MCNC: 0.83 MG/DL (ref 0.2–1)
BUN SERPL-MCNC: 17 MG/DL (ref 5–25)
CALCIUM SERPL-MCNC: 8.3 MG/DL (ref 8.3–10.1)
CHLORIDE SERPL-SCNC: 110 MMOL/L (ref 100–108)
CO2 SERPL-SCNC: 27 MMOL/L (ref 21–32)
CREAT SERPL-MCNC: 0.66 MG/DL (ref 0.6–1.3)
EOSINOPHIL # BLD AUTO: 0.12 THOUSAND/ΜL (ref 0–0.61)
EOSINOPHIL NFR BLD AUTO: 2 % (ref 0–6)
ERYTHROCYTE [DISTWIDTH] IN BLOOD BY AUTOMATED COUNT: 14.1 % (ref 11.6–15.1)
ERYTHROCYTE [SEDIMENTATION RATE] IN BLOOD: 2 MM/HOUR (ref 0–20)
FERRITIN SERPL-MCNC: 7 NG/ML (ref 8–388)
GFR SERPL CREATININE-BSD FRML MDRD: 109 ML/MIN/1.73SQ M
GLUCOSE P FAST SERPL-MCNC: 85 MG/DL (ref 65–99)
HCT VFR BLD AUTO: 43.8 % (ref 34.8–46.1)
HGB BLD-MCNC: 14.3 G/DL (ref 11.5–15.4)
IMM GRANULOCYTES # BLD AUTO: 0.01 THOUSAND/UL (ref 0–0.2)
IMM GRANULOCYTES NFR BLD AUTO: 0 % (ref 0–2)
IRON SATN MFR SERPL: 38 %
IRON SERPL-MCNC: 155 UG/DL (ref 50–170)
LYMPHOCYTES # BLD AUTO: 1.26 THOUSANDS/ΜL (ref 0.6–4.47)
LYMPHOCYTES NFR BLD AUTO: 24 % (ref 14–44)
MCH RBC QN AUTO: 29.8 PG (ref 26.8–34.3)
MCHC RBC AUTO-ENTMCNC: 32.6 G/DL (ref 31.4–37.4)
MCV RBC AUTO: 91 FL (ref 82–98)
MONOCYTES # BLD AUTO: 0.47 THOUSAND/ΜL (ref 0.17–1.22)
MONOCYTES NFR BLD AUTO: 9 % (ref 4–12)
NEUTROPHILS # BLD AUTO: 3.37 THOUSANDS/ΜL (ref 1.85–7.62)
NEUTS SEG NFR BLD AUTO: 64 % (ref 43–75)
NRBC BLD AUTO-RTO: 0 /100 WBCS
PLATELET # BLD AUTO: 196 THOUSANDS/UL (ref 149–390)
PMV BLD AUTO: 10.8 FL (ref 8.9–12.7)
POTASSIUM SERPL-SCNC: 4.2 MMOL/L (ref 3.5–5.3)
PROT SERPL-MCNC: 6.8 G/DL (ref 6.4–8.2)
RBC # BLD AUTO: 4.8 MILLION/UL (ref 3.81–5.12)
SODIUM SERPL-SCNC: 140 MMOL/L (ref 136–145)
TIBC SERPL-MCNC: 407 UG/DL (ref 250–450)
TSH SERPL DL<=0.05 MIU/L-ACNC: 1.13 UIU/ML (ref 0.36–3.74)
WBC # BLD AUTO: 5.28 THOUSAND/UL (ref 4.31–10.16)

## 2019-05-26 PROCEDURE — 85652 RBC SED RATE AUTOMATED: CPT

## 2019-05-26 PROCEDURE — 36415 COLL VENOUS BLD VENIPUNCTURE: CPT

## 2019-05-26 PROCEDURE — 84590 ASSAY OF VITAMIN A: CPT

## 2019-05-26 PROCEDURE — 82728 ASSAY OF FERRITIN: CPT

## 2019-05-26 PROCEDURE — 85025 COMPLETE CBC W/AUTO DIFF WBC: CPT

## 2019-05-26 PROCEDURE — 80053 COMPREHEN METABOLIC PANEL: CPT

## 2019-05-26 PROCEDURE — 83540 ASSAY OF IRON: CPT

## 2019-05-26 PROCEDURE — 84443 ASSAY THYROID STIM HORMONE: CPT

## 2019-05-26 PROCEDURE — 86618 LYME DISEASE ANTIBODY: CPT

## 2019-05-26 PROCEDURE — 83550 IRON BINDING TEST: CPT

## 2019-05-26 PROCEDURE — 86038 ANTINUCLEAR ANTIBODIES: CPT

## 2019-05-28 LAB
B BURGDOR IGG SER IA-ACNC: 0.2
B BURGDOR IGM SER IA-ACNC: 0.33
RYE IGE QN: NEGATIVE

## 2019-05-30 ENCOUNTER — OFFICE VISIT (OUTPATIENT)
Dept: INTERNAL MEDICINE CLINIC | Age: 44
End: 2019-05-30
Payer: COMMERCIAL

## 2019-05-30 VITALS
HEIGHT: 68 IN | TEMPERATURE: 97.7 F | BODY MASS INDEX: 23.34 KG/M2 | SYSTOLIC BLOOD PRESSURE: 106 MMHG | DIASTOLIC BLOOD PRESSURE: 72 MMHG | HEART RATE: 82 BPM | OXYGEN SATURATION: 97 % | WEIGHT: 154 LBS

## 2019-05-30 DIAGNOSIS — D50.8 OTHER IRON DEFICIENCY ANEMIA: ICD-10-CM

## 2019-05-30 DIAGNOSIS — R51.9 NEW ONSET OF HEADACHES: Primary | ICD-10-CM

## 2019-05-30 PROCEDURE — 99214 OFFICE O/P EST MOD 30 MIN: CPT | Performed by: PHYSICIAN ASSISTANT

## 2019-05-30 PROCEDURE — 1036F TOBACCO NON-USER: CPT | Performed by: PHYSICIAN ASSISTANT

## 2019-05-30 PROCEDURE — 3008F BODY MASS INDEX DOCD: CPT | Performed by: PHYSICIAN ASSISTANT

## 2019-05-30 RX ORDER — BUTALBITAL, ACETAMINOPHEN AND CAFFEINE 50; 325; 40 MG/1; MG/1; MG/1
1 TABLET ORAL EVERY 4 HOURS PRN
Qty: 30 TABLET | Refills: 0 | Status: SHIPPED | OUTPATIENT
Start: 2019-05-30 | End: 2022-06-13

## 2019-05-31 DIAGNOSIS — E61.1 IRON DEFICIENCY: Primary | ICD-10-CM

## 2019-05-31 LAB — VIT A SERPL-MCNC: 27.9 UG/DL (ref 20.1–62)

## 2019-07-08 ENCOUNTER — OFFICE VISIT (OUTPATIENT)
Dept: INTERNAL MEDICINE CLINIC | Facility: CLINIC | Age: 44
End: 2019-07-08
Payer: COMMERCIAL

## 2019-07-08 VITALS
BODY MASS INDEX: 23.95 KG/M2 | WEIGHT: 152.6 LBS | HEART RATE: 62 BPM | HEIGHT: 67 IN | OXYGEN SATURATION: 100 % | DIASTOLIC BLOOD PRESSURE: 66 MMHG | TEMPERATURE: 97.4 F | SYSTOLIC BLOOD PRESSURE: 110 MMHG

## 2019-07-08 DIAGNOSIS — M54.41 ACUTE RIGHT-SIDED LOW BACK PAIN WITH RIGHT-SIDED SCIATICA: ICD-10-CM

## 2019-07-08 DIAGNOSIS — R30.0 BURNING WITH URINATION: Primary | ICD-10-CM

## 2019-07-08 DIAGNOSIS — N30.00 ACUTE CYSTITIS WITHOUT HEMATURIA: ICD-10-CM

## 2019-07-08 LAB
BILIRUB UR QL STRIP: NEGATIVE
CLARITY UR: CLEAR
COLOR UR: NORMAL
GLUCOSE UR STRIP-MCNC: NEGATIVE MG/DL
HGB UR QL STRIP.AUTO: NEGATIVE
KETONES UR STRIP-MCNC: NEGATIVE MG/DL
LEUKOCYTE ESTERASE UR QL STRIP: NEGATIVE
NITRITE UR QL STRIP: NEGATIVE
PH UR STRIP.AUTO: 6 [PH]
PROT UR STRIP-MCNC: NEGATIVE MG/DL
SL AMB  POCT GLUCOSE, UA: NEGATIVE
SL AMB LEUKOCYTE ESTERASE,UA: NEGATIVE
SL AMB POCT BILIRUBIN,UA: NEGATIVE
SL AMB POCT BLOOD,UA: NEGATIVE
SL AMB POCT CLARITY,UA: NORMAL
SL AMB POCT COLOR,UA: NORMAL
SL AMB POCT KETONES,UA: NEGATIVE
SL AMB POCT NITRITE,UA: NEGATIVE
SL AMB POCT PH,UA: 5.5
SL AMB POCT SPECIFIC GRAVITY,UA: 1.03
SL AMB POCT URINE PROTEIN: NEGATIVE
SL AMB POCT UROBILINOGEN: 0.2
SP GR UR STRIP.AUTO: 1.02 (ref 1–1.03)
UROBILINOGEN UR QL STRIP.AUTO: 1 E.U./DL

## 2019-07-08 PROCEDURE — 99213 OFFICE O/P EST LOW 20 MIN: CPT | Performed by: INTERNAL MEDICINE

## 2019-07-08 PROCEDURE — 81003 URINALYSIS AUTO W/O SCOPE: CPT | Performed by: INTERNAL MEDICINE

## 2019-07-08 RX ORDER — CYCLOBENZAPRINE HCL 5 MG
5 TABLET ORAL 3 TIMES DAILY PRN
Qty: 30 TABLET | Refills: 0 | Status: SHIPPED | OUTPATIENT
Start: 2019-07-08 | End: 2019-08-12

## 2019-07-08 RX ORDER — NITROFURANTOIN 25; 75 MG/1; MG/1
100 CAPSULE ORAL 2 TIMES DAILY
Qty: 10 CAPSULE | Refills: 0 | Status: SHIPPED | OUTPATIENT
Start: 2019-07-08 | End: 2019-07-13

## 2019-07-08 NOTE — PROGRESS NOTES
Assessment/Plan:    Acute cystitis without hematuria  Given history of postcoital UTI requiring antibiotic prophylaxis in the past as well as recent intercourse followed my current symptomatology, will start antibiotic therapy:  Macrobid  Will send out urine for urinalysis with culture and sensitivities  Acute right-sided low back pain with right-sided sciatica  Will prescribe cyclobenzaprine 5 mg to be taken every 8 hours as needed for muscle spasms  She may take Tylenol 1000 mg every 8 hours as needed for pain  Will refer to chiropractic medicine  Defer on physical therapy at this time as she performs home stretching exercises regularly  Diagnoses and all orders for this visit:    Burning with urination  -     POCT urine dip auto non-scope    Acute cystitis without hematuria  -     nitrofurantoin (MACROBID) 100 mg capsule; Take 1 capsule (100 mg total) by mouth 2 (two) times a day for 5 days  -     UA w Reflex to Microscopic w Reflex to Culture - Clinic Collect    Acute right-sided low back pain with right-sided sciatica  -     cyclobenzaprine (FLEXERIL) 5 mg tablet; Take 1 tablet (5 mg total) by mouth 3 (three) times a day as needed for muscle spasms  -     Ambulatory referral to Chiropractic; Future          Subjective:      Patient ID: Simon Durand is a 40 y o  female  40year old female is seen today with dysuria since yesterday  She has had a h/o of postcoital UTI to which she was on antibiotic prophylaxis  She also reports having low back pain since 2-weeks  Pain improved shortly after however pain restarted yesterday  She has been taking Tylenol for pain  Urinary Tract Infection    This is a new problem  The current episode started yesterday  The problem occurs every urination  The problem has been unchanged  The quality of the pain is described as burning  The patient is experiencing no pain  There has been no fever  She is sexually active (Sunday morning)   There is no history of pyelonephritis  Pertinent negatives include no chills, discharge, flank pain, frequency, hematuria, hesitancy, nausea, possible pregnancy, sweats, urgency or vomiting  She has tried acetaminophen for the symptoms  The treatment provided mild relief  Back Pain   This is a new problem  The current episode started yesterday  The pain is present in the lumbar spine  The quality of the pain is described as shooting  The pain radiates to the right knee, right thigh and right foot  The pain is the same all the time  The symptoms are aggravated by bending (Raising left leg)  Stiffness is present all day  Associated symptoms include dysuria  Pertinent negatives include no abdominal pain, bladder incontinence, bowel incontinence, chest pain, fever, headaches, leg pain, numbness, paresis, paresthesias, pelvic pain, perianal numbness, tingling, weakness or weight loss  Treatments tried: Tylenol  The following portions of the patient's history were reviewed and updated as appropriate: allergies, current medications, past family history, past medical history, past social history, past surgical history and problem list     Review of Systems   Constitutional: Negative for activity change, appetite change, chills, diaphoresis, fatigue, fever and weight loss  HENT: Negative for congestion, postnasal drip, rhinorrhea, sinus pressure, sinus pain, sneezing and sore throat  Eyes: Negative for visual disturbance  Respiratory: Negative for apnea, cough, choking, chest tightness, shortness of breath and wheezing  Cardiovascular: Negative for chest pain, palpitations and leg swelling  Gastrointestinal: Negative for abdominal distention, abdominal pain, anal bleeding, blood in stool, bowel incontinence, constipation, diarrhea, nausea and vomiting  Endocrine: Negative for cold intolerance and heat intolerance  Genitourinary: Positive for dysuria   Negative for bladder incontinence, difficulty urinating, flank pain, frequency, hematuria, hesitancy, pelvic pain and urgency  Musculoskeletal: Positive for back pain  Skin: Negative  Neurological: Negative for dizziness, tingling, weakness, light-headedness, numbness, headaches and paresthesias  Hematological: Negative for adenopathy  Psychiatric/Behavioral: Negative for agitation, sleep disturbance and suicidal ideas  All other systems reviewed and are negative          Past Medical History:   Diagnosis Date    Bulla of lung ([address] )     Coccydynia     Family history of vitamin B12 deficiency     Hematuria     last assessed: 12/11/2013    History of hypertension     History of morbid obesity     History of vitamin B deficiency     History of vitamin D deficiency     Hypertension     last assessed: 4/4/2014    Hypocalcemia     Hypocalcemia     Iron deficiency     Iron deficiency     Kyphosis deformity of spine     Menopause     Migraine     Morbid (severe) obesity due to excess calories ([facility] [address] )     per [employer]-last assessed: 4/11/2014    Postgastrectomy malabsorption     Vitamin A deficiency     last assessed: 11/11/2014         Current Outpatient Medications:     Ascorbic Acid ([employer]S VITAMIN C) 500 MG CHEW, Chew 1 tablet 2 (two) times a day, Disp: , Rfl:     butalbital-acetaminophen-caffeine (FIORICET,ESGIC) -40 mg per tablet, Take 1 tablet by mouth every 4 (four) hours as needed for headaches, Disp: 30 tablet, Rfl: 0    calcium carbonate-vitamin D (OSCAL-D) 500 mg-200 units per tablet, Take 1 tablet by mouth 2 (two) times a day with meals, Disp: , Rfl:     cyanocobalamin (VITAMIN B-12) 500 mcg tablet, Take 500 mcg by mouth daily, Disp: , Rfl:     ferrous sulfate (CVS IRON) 325 (65 Fe) mg tablet, Take 325 mg by mouth 2 (two) times a day, Disp: , Rfl:     multivitamin (THERAGRAN) TABS, Take 1 tablet by mouth 2 (two) times a day, Disp: , Rfl:     cyclobenzaprine (FLEXERIL) 5 mg tablet, Take 1 tablet (5 mg total) by mouth 3 (three) times a day as needed for muscle spasms, Disp: 30 tablet, Rfl: 0    nitrofurantoin (MACROBID) 100 mg capsule, Take 1 capsule (100 mg total) by mouth 2 (two) times a day for 5 days, Disp: 10 capsule, Rfl: 0    Allergies   Allergen Reactions    Pollen Extract Allergic Rhinitis       Social History   Past Surgical History:   Procedure Laterality Date    APPENDECTOMY      AUGMENTATION MAMMAPLASTY      BRAIN SURGERY      AVM    BREAST BIOPSY      BREAST LUMPECTOMY Right 1992     SECTION      COSMETIC SURGERY      tummy tuck,breast implants and lift    ENDOMETRIAL ABLATION  2018    REFRACTIVE SURGERY      glaucoma    HITESH-EN-Y PROCEDURE      TONSILLECTOMY AND ADENOIDECTOMY      including adenoids    TOOTH EXTRACTION      TUBAL LIGATION      US GUIDANCE BREAST BIOPSY LEFT EACH ADDITIONAL Left 10/12/2018    US GUIDED BREAST BIOPSY LEFT COMPLETE Left 10/12/2018     Family History   Problem Relation Age of Onset    Hypertension Mother     Atrial fibrillation Mother     Melanoma Mother     Cancer Mother         on left upper chest    Hypertension Father     Atrial fibrillation Father     Diabetes Father     Obesity Father     Stomach cancer Maternal Aunt     Other Maternal Aunt         GIST, malignant-per [employer]    Stomach cancer Paternal Aunt     Allergies Family     Cancer Family     Graves' disease Family     Heart disease Family     Obesity Family     Stroke Family     Depression Neg Hx     Anxiety disorder Neg Hx     Drug abuse Neg Hx        Objective:  /66 (BP Location: Left arm, Patient Position: Sitting, Cuff Size: Adult)   Pulse 62   Temp (!) 97 4 °F (36 3 °C) (Oral)   Ht 5' 7" (1 702 m)   Wt 69 2 kg (152 lb 9 6 oz)   SpO2 100% Comment: room air  BMI 23 90 kg/m²     Recent Results (from the past 1344 hour(s))   CBC and differential    Collection Time: 19  8:11 AM   Result Value Ref Range    WBC 5 28 4 31 - 10 16 Thousand/uL    RBC 4 80 3 81 - 5 12 Million/uL    Hemoglobin 14 3 11 5 - 15 4 g/dL    Hematocrit 43 8 34 8 - 46 1 %    MCV 91 82 - 98 fL    MCH 29 8 26 8 - 34 3 pg    MCHC 32 6 31 4 - 37 4 g/dL    RDW 14 1 11 6 - 15 1 %    MPV 10 8 8 9 - 12 7 fL    Platelets 395 887 - 716 Thousands/uL    nRBC 0 /100 WBCs    Neutrophils Relative 64 43 - 75 %    Immat GRANS % 0 0 - 2 %    Lymphocytes Relative 24 14 - 44 %    Monocytes Relative 9 4 - 12 %    Eosinophils Relative 2 0 - 6 %    Basophils Relative 1 0 - 1 %    Neutrophils Absolute 3 37 1 85 - 7 62 Thousands/µL    Immature Grans Absolute 0 01 0 00 - 0 20 Thousand/uL    Lymphocytes Absolute 1 26 0 60 - 4 47 Thousands/µL    Monocytes Absolute 0 47 0 17 - 1 22 Thousand/µL    Eosinophils Absolute 0 12 0 00 - 0 61 Thousand/µL    Basophils Absolute 0 05 0 00 - 0 10 Thousands/µL   Comprehensive metabolic panel    Collection Time: 05/26/19  8:11 AM   Result Value Ref Range    Sodium 140 136 - 145 mmol/L    Potassium 4 2 3 5 - 5 3 mmol/L    Chloride 110 (H) 100 - 108 mmol/L    CO2 27 21 - 32 mmol/L    ANION GAP 3 (L) 4 - 13 mmol/L    BUN 17 5 - 25 mg/dL    Creatinine 0 66 0 60 - 1 30 mg/dL    Glucose, Fasting 85 65 - 99 mg/dL    Calcium 8 3 8 3 - 10 1 mg/dL    AST 14 5 - 45 U/L    ALT 16 12 - 78 U/L    Alkaline Phosphatase 77 46 - 116 U/L    Total Protein 6 8 6 4 - 8 2 g/dL    Albumin 3 8 3 5 - 5 0 g/dL    Total Bilirubin 0 83 0 20 - 1 00 mg/dL    eGFR 109 ml/min/1 73sq m   Lyme Antibody Profile with reflex to WB    Collection Time: 05/26/19  8:11 AM   Result Value Ref Range    LYME AB IGG 0 20 0 00 - 0 79    LYME AB IGM 0 33 0 00 - 0 79   Sedimentation rate, automated    Collection Time: 05/26/19  8:11 AM   Result Value Ref Range    Sed Rate 2 0 - 20 mm/hour   TSH, 3rd generation    Collection Time: 05/26/19  8:11 AM   Result Value Ref Range    TSH 3RD GENERATON 1 130 0 358 - 3 740 uIU/mL   KIERAN Screen w/ Reflex to Titer/Pattern    Collection Time: 05/26/19  8:11 AM   Result Value Ref Range    KIERAN Negative Negative Ferritin    Collection Time: 05/26/19  8:11 AM   Result Value Ref Range    Ferritin 7 (L) 8 - 388 ng/mL   Iron Saturation %    Collection Time: 05/26/19  8:11 AM   Result Value Ref Range    Iron Saturation 38 %    TIBC 407 250 - 450 ug/dL    Iron 155 50 - 170 ug/dL   Vitamin A    Collection Time: 05/26/19  8:11 AM   Result Value Ref Range    Vitamin A 27 9 20 1 - 62 0 ug/dL   POCT urine dip auto non-scope    Collection Time: 07/08/19  3:22 PM   Result Value Ref Range     COLOR,UA dark yellow     CLARITY,UA cloudy     SPECIFIC GRAVITY,UA 1 030      PH,UA 5 5     LEUKOCYTE ESTERASE,UA negative     NITRITE,UA negative     GLUCOSE, UA negative     KETONES,UA negative     BILIRUBIN,UA negative     BLOOD,UA negative     POCT URINE PROTEIN negative     SL AMB POCT UROBILINOGEN 0 2             Physical Exam   Constitutional: She is oriented to person, place, and time  She appears well-developed and well-nourished  No distress  HENT:   Head: Normocephalic and atraumatic  Eyes: Pupils are equal, round, and reactive to light  Conjunctivae and EOM are normal  Right eye exhibits no discharge  Left eye exhibits no discharge  Neck: Normal range of motion  Neck supple  No JVD present  No thyromegaly present  Cardiovascular: Normal rate, regular rhythm, normal heart sounds and intact distal pulses  Exam reveals no gallop and no friction rub  No murmur heard  Pulmonary/Chest: Effort normal and breath sounds normal  No respiratory distress  She has no wheezes  She has no rales  She exhibits no tenderness  Abdominal: Soft  She exhibits no distension  There is no tenderness  Musculoskeletal: Normal range of motion  She exhibits no edema or deformity  Lumbar back: She exhibits tenderness and pain  She exhibits normal range of motion, no bony tenderness, no swelling, no edema, no deformity, no laceration and no spasm  Lymphadenopathy:     She has no cervical adenopathy     Neurological: She is alert and oriented to person, place, and time  No cranial nerve deficit  Coordination normal    Skin: Skin is warm and dry  No rash noted  She is not diaphoretic  No erythema  No pallor  Psychiatric: She has a normal mood and affect  Her behavior is normal  Judgment and thought content normal    Nursing note and vitals reviewed

## 2019-07-08 NOTE — ASSESSMENT & PLAN NOTE
Will prescribe cyclobenzaprine 5 mg to be taken every 8 hours as needed for muscle spasms  She may take Tylenol 1000 mg every 8 hours as needed for pain  Will refer to chiropractic medicine  Defer on physical therapy at this time as she performs home stretching exercises regularly

## 2019-07-08 NOTE — ASSESSMENT & PLAN NOTE
Given history of postcoital UTI requiring antibiotic prophylaxis in the past as well as recent intercourse followed my current symptomatology, will start antibiotic therapy:  [facility]  Will send out urine for urinalysis with culture and sensitivities

## 2019-08-12 ENCOUNTER — OFFICE VISIT (OUTPATIENT)
Dept: INTERNAL MEDICINE CLINIC | Facility: CLINIC | Age: 44
End: 2019-08-12
Payer: COMMERCIAL

## 2019-08-12 VITALS
WEIGHT: 153 LBS | DIASTOLIC BLOOD PRESSURE: 66 MMHG | TEMPERATURE: 98.6 F | SYSTOLIC BLOOD PRESSURE: 116 MMHG | HEIGHT: 67 IN | OXYGEN SATURATION: 98 % | HEART RATE: 70 BPM | BODY MASS INDEX: 24.01 KG/M2

## 2019-08-12 DIAGNOSIS — M54.41 ACUTE RIGHT-SIDED LOW BACK PAIN WITH RIGHT-SIDED SCIATICA: ICD-10-CM

## 2019-08-12 DIAGNOSIS — K91.2 POSTSURGICAL MALABSORPTION: Primary | ICD-10-CM

## 2019-08-12 PROBLEM — N30.00 ACUTE CYSTITIS WITHOUT HEMATURIA: Status: RESOLVED | Noted: 2019-07-08 | Resolved: 2019-08-12

## 2019-08-12 PROCEDURE — 99214 OFFICE O/P EST MOD 30 MIN: CPT | Performed by: INTERNAL MEDICINE

## 2019-08-12 PROCEDURE — 3008F BODY MASS INDEX DOCD: CPT | Performed by: INTERNAL MEDICINE

## 2019-08-12 PROCEDURE — 1036F TOBACCO NON-USER: CPT | Performed by: INTERNAL MEDICINE

## 2019-08-12 NOTE — PROGRESS NOTES
Assessment/Plan:    Postsurgical malabsorption  Follow-up with bariatric surgery  Acute right-sided low back pain with right-sided sciatica  Continue follow-up with chiropractic medicine as needed  Anemia, iron deficiency  Continue with iron supplementation  Diagnoses and all orders for this visit:    Postsurgical malabsorption    Acute right-sided low back pain with right-sided sciatica          Subjective:      Patient ID: Mindy Turner is a 40 y o  female  40year old female is seen today for follow up of chronic conditions  No laboratory studies to review today  No active complaints at this time  She reports urinary symptoms have subsided  She has follow-up with her bariatric surgeon and gynecologist   She continues to follow a healthy diet and exercise  The following portions of the patient's history were reviewed and updated as appropriate: allergies, current medications, past family history, past medical history, past social history, past surgical history and problem list     Review of Systems   Constitutional: Negative for activity change, appetite change, chills, diaphoresis, fatigue and fever  HENT: Negative for congestion, postnasal drip, rhinorrhea, sinus pressure, sinus pain, sneezing and sore throat  Eyes: Negative for visual disturbance  Respiratory: Negative for apnea, cough, choking, chest tightness, shortness of breath and wheezing  Cardiovascular: Negative for chest pain, palpitations and leg swelling  Gastrointestinal: Negative for abdominal distention, abdominal pain, anal bleeding, blood in stool, constipation, diarrhea, nausea and vomiting  Endocrine: Negative for cold intolerance and heat intolerance  Genitourinary: Negative for difficulty urinating, dysuria and hematuria  Musculoskeletal: Negative  Skin: Negative  Neurological: Negative for dizziness, weakness, light-headedness, numbness and headaches     Hematological: Negative for adenopathy  Psychiatric/Behavioral: Negative for agitation, sleep disturbance and suicidal ideas  All other systems reviewed and are negative          Past Medical History:   Diagnosis Date    Bulla of lung ([address] )     Coccydynia     Family history of vitamin B12 deficiency     Fibroid, uterine     Hematuria     last assessed: 2013    History of hypertension     History of morbid obesity     History of vitamin B deficiency     History of vitamin D deficiency     Hypertension     last assessed: 2014    Hypocalcemia     Hypocalcemia     Iron deficiency     Iron deficiency     Kyphosis deformity of spine     Menopause     Migraine     Morbid (severe) obesity due to excess calories ([address] )     per [employer]-last assessed: 2014    Postgastrectomy malabsorption     Vitamin A deficiency     last assessed: 2014         Current Outpatient Medications:     Ascorbic Acid (CVS VITAMIN C) 500 MG CHEW, Chew 1 tablet 2 (two) times a day, Disp: , Rfl:     butalbital-acetaminophen-caffeine (FIORICET,ESGIC) -40 mg per tablet, Take 1 tablet by mouth every 4 (four) hours as needed for headaches, Disp: 30 tablet, Rfl: 0    calcium carbonate-vitamin D (OSCAL-D) 500 mg-200 units per tablet, Take 1 tablet by mouth 2 (two) times a day with meals, Disp: , Rfl:     cyanocobalamin (VITAMIN B-12) 500 mcg tablet, Take 500 mcg by mouth daily, Disp: , Rfl:     ferrous sulfate (CVS IRON) 325 (65 Fe) mg tablet, Take 325 mg by mouth 2 (two) times a day, Disp: , Rfl:     multivitamin (THERAGRAN) TABS, Take 1 tablet by mouth 2 (two) times a day, Disp: , Rfl:     Allergies   Allergen Reactions    Pollen Extract Allergic Rhinitis       Social History   Past Surgical History:   Procedure Laterality Date    APPENDECTOMY      AUGMENTATION MAMMAPLASTY      BRAIN SURGERY      AVM    BREAST BIOPSY      BREAST LUMPECTOMY Right 1992     SECTION  1996    COSMETIC SURGERY      tummy tuck,breast implants and lift    ENDOMETRIAL ABLATION  08/2018    REFRACTIVE SURGERY      glaucoma    HITESH-EN-Y PROCEDURE  2014    TONSILLECTOMY AND ADENOIDECTOMY      including adenoids    TOOTH EXTRACTION      TUBAL LIGATION      US GUIDANCE BREAST BIOPSY LEFT EACH ADDITIONAL Left 10/12/2018    US GUIDED BREAST BIOPSY LEFT COMPLETE Left 10/12/2018     Family History   Problem Relation Age of Onset    Hypertension Mother     Atrial fibrillation Mother     Melanoma Mother     Cancer Mother         on left upper chest    Graves' disease Mother     Hypertension Father     Atrial fibrillation Father     Diabetes Father     Obesity Father     Allergies Father     Heart disease Father     Stomach cancer Maternal Aunt     Other Maternal Aunt         GIST, malignant-per [employer]    Stomach cancer Paternal Aunt     Cancer Maternal Grandfather         Lung    Stroke Paternal Grandmother     Cancer Paternal Grandfather         Lung    Depression Neg Hx     Anxiety disorder Neg Hx     Drug abuse Neg Hx        Objective:  /66 (BP Location: Left arm, Patient Position: Sitting, Cuff Size: Adult)   Pulse 70   Temp 98 6 °F (37 °C) (Oral)   Ht 5' 7" (1 702 m)   Wt 69 4 kg (153 lb) Comment: with sandals  SpO2 98% Comment: ra  BMI 23 96 kg/m²     Recent Results (from the past 1344 hour(s))   POCT urine dip auto non-scope    Collection Time: 07/08/19  3:22 PM   Result Value Ref Range     COLOR,UA dark yellow     CLARITY,UA cloudy     SPECIFIC GRAVITY,UA 1 030      PH,UA 5 5     LEUKOCYTE ESTERASE,UA negative     NITRITE,UA negative     GLUCOSE, UA negative     KETONES,UA negative     BILIRUBIN,UA negative     BLOOD,UA negative     POCT URINE PROTEIN negative     SL AMB POCT UROBILINOGEN 0 2    UA w Reflex to Microscopic w Reflex to Culture - Clinic Collect    Collection Time: 07/08/19  4:17 PM   Result Value Ref Range    Color, UA Dk Yellow     Clarity, UA Clear     Specific [place], UA 1 022 1 003 - 1 030    pH, UA 6 0 4 5, 5 0, 5 5, 6 0, 6 5, 7 0, 7 5, 8 0    Leukocytes, UA Negative Negative    Nitrite, UA Negative Negative    Protein, UA Negative Negative mg/dl    Glucose, UA Negative Negative mg/dl    Ketones, UA Negative Negative mg/dl    Urobilinogen, UA 1 0 0 2, 1 0 E U /dl E U /dl    Bilirubin, UA Negative Negative    Blood, UA Negative Negative            Physical Exam   Constitutional: She is oriented to person, place, and time  She appears well-developed and well-nourished  No distress  HENT:   Head: Normocephalic and atraumatic  Eyes: Pupils are equal, round, and reactive to light  Conjunctivae and EOM are normal  Right eye exhibits no discharge  Left eye exhibits no discharge  Neck: Normal range of motion  Neck supple  No JVD present  No thyromegaly present  Cardiovascular: Normal rate, regular rhythm, normal heart sounds and intact distal pulses  Exam reveals no gallop and no friction rub  No murmur heard  Pulmonary/Chest: Effort normal and breath sounds normal  No respiratory distress  She has no wheezes  She has no rales  She exhibits no tenderness  Abdominal: Soft  She exhibits no distension  There is no tenderness  Musculoskeletal: Normal range of motion  She exhibits no edema, tenderness or deformity  Lymphadenopathy:     She has no cervical adenopathy  Neurological: She is alert and oriented to person, place, and time  No cranial nerve deficit  Coordination normal    Skin: Skin is warm and dry  No rash noted  She is not diaphoretic  No erythema  No pallor  Psychiatric: She has a normal mood and affect  Her behavior is normal  Judgment and thought content normal    Nursing note and vitals reviewed

## 2019-08-18 ENCOUNTER — APPOINTMENT (OUTPATIENT)
Dept: LAB | Age: 44
End: 2019-08-18
Payer: COMMERCIAL

## 2019-08-18 DIAGNOSIS — E61.1 IRON DEFICIENCY: ICD-10-CM

## 2019-08-18 LAB
FERRITIN SERPL-MCNC: 14 NG/ML (ref 8–388)
IRON SATN MFR SERPL: 24 %
IRON SERPL-MCNC: 93 UG/DL (ref 50–170)
TIBC SERPL-MCNC: 385 UG/DL (ref 250–450)

## 2019-08-18 PROCEDURE — 82728 ASSAY OF FERRITIN: CPT

## 2019-08-18 PROCEDURE — 36415 COLL VENOUS BLD VENIPUNCTURE: CPT

## 2019-08-18 PROCEDURE — 83540 ASSAY OF IRON: CPT

## 2019-08-18 PROCEDURE — 83550 IRON BINDING TEST: CPT

## 2019-08-26 ENCOUNTER — APPOINTMENT (OUTPATIENT)
Dept: RADIOLOGY | Age: 44
End: 2019-08-26
Payer: COMMERCIAL

## 2019-08-26 ENCOUNTER — TRANSCRIBE ORDERS (OUTPATIENT)
Dept: ADMINISTRATIVE | Age: 44
End: 2019-08-26

## 2019-08-26 DIAGNOSIS — M79.671 RIGHT FOOT PAIN: ICD-10-CM

## 2019-08-26 DIAGNOSIS — M79.671 RIGHT FOOT PAIN: Primary | ICD-10-CM

## 2019-08-26 PROCEDURE — 73630 X-RAY EXAM OF FOOT: CPT

## 2019-08-28 PROBLEM — N80.03 ADENOMYOSIS: Status: ACTIVE | Noted: 2019-08-28

## 2019-08-28 PROBLEM — N80.0 ADENOMYOSIS: Status: ACTIVE | Noted: 2019-08-28

## 2019-08-28 PROBLEM — N94.6 DYSMENORRHEA: Status: ACTIVE | Noted: 2019-08-28

## 2019-09-05 ENCOUNTER — APPOINTMENT (OUTPATIENT)
Dept: LAB | Facility: HOSPITAL | Age: 44
End: 2019-09-05
Attending: OBSTETRICS & GYNECOLOGY
Payer: COMMERCIAL

## 2019-09-05 DIAGNOSIS — Z01.812 ENCOUNTER FOR PRE-OPERATIVE LABORATORY TESTING: ICD-10-CM

## 2019-09-05 LAB
EST. AVERAGE GLUCOSE BLD GHB EST-MCNC: 100 MG/DL
HBA1C MFR BLD: 5.1 % (ref 4.2–6.3)

## 2019-09-05 PROCEDURE — 36415 COLL VENOUS BLD VENIPUNCTURE: CPT

## 2019-09-05 PROCEDURE — 83036 HEMOGLOBIN GLYCOSYLATED A1C: CPT

## 2019-09-09 NOTE — PRE-PROCEDURE INSTRUCTIONS
Pre-Surgery Instructions:   Medication Instructions    Ascorbic Acid (CVS VITAMIN C) 500 MG CHEW Instructed patient per Anesthesia Guidelines   butalbital-acetaminophen-caffeine (FIORICET,ESGIC) -40 mg per tablet Instructed patient per Anesthesia Guidelines   calcium carbonate-vitamin D (OSCAL-D) 500 mg-200 units per tablet Instructed patient per Anesthesia Guidelines   cyanocobalamin (VITAMIN B-12) 500 mcg tablet Instructed patient per Anesthesia Guidelines   ferrous sulfate (CVS IRON) 325 (65 Fe) mg tablet Instructed patient per Anesthesia Guidelines   multivitamin (THERAGRAN) TABS Instructed patient per Anesthesia Guidelines  Instructed has no medications to be taken the morning of surgery  No aspirin, NSAIDs, vitamins or supplements 1 week before surgery

## 2019-09-10 PROCEDURE — NC001 PR NO CHARGE: Performed by: OBSTETRICS & GYNECOLOGY

## 2019-09-10 NOTE — H&P
Assessment/Plan:     Discussed with the patient my suspicion for adenomyosis  We discussed options including following versus medical management versus surgical management  Patient is considering a hysterectomy  We discussed supracervical versus total hysterectomy both with salpingectomy  The risks of the surgery were discussed including, but not limited to, infection, hemorrhage, bowel, bladder, vascular injury, ureteral injury, possible necessity for laparotomy           Adenomyosis     Dysmenorrhea         Subjective:       Patient ID: Eva Fine is a 37 y o  female      HPI    Patient is status post endometrial ablation performed in 2018  Since that time she has continued to have regular menses, not as heavy as prior to the ablation, but more like a normal flow  She still has fairly significant dysmenorrhea with her menses associated with lower back and pelvic pain and pressure      The following portions of the patient's history were reviewed and updated as appropriate:   She  has a past medical history of Bulla of lung ([address] ), Coccydynia, Family history of vitamin B12 deficiency, History of hypertension, History of morbid obesity, History of vitamin B deficiency, History of vitamin D deficiency, Hypocalcemia, Hypocalcemia, Iron deficiency, Iron deficiency, Kyphosis deformity of spine, Menopause, Migraine, and Postgastrectomy malabsorption  She        Patient Active Problem List     Diagnosis Date Noted    Bariatric surgery status 2018    Postsurgical malabsorption 2018    Abnormal CT of the chest 2017    Bulla, lung ([address] ) 2017    Anemia, iron deficiency 2017    Coccydynia 2016    Back pain 2014    Neoplasm of skin 2014      She  has a past surgical history that includes Marilee-en-y procedure ();  section (); Breast lumpectomy (Right, ); Tonsillectomy; Brain surgery (); Appendectomy;  Endometrial ablation (08/2018); US guided breast biopsy left complete (Left, 10/12/2018); US guidance breast biopsy left each additional (Left, 10/12/2018); Breast biopsy; Augmentation mammaplasty; Cosmetic surgery; and Refractive surgery  Her family history includes Atrial fibrillation in her father and mother; Diabetes in her father; Hypertension in her father and mother; Melanoma in her mother  She  reports that she has never smoked  She has never used smokeless tobacco  She reports that she drinks alcohol  She reports that she does not use drugs           Current Outpatient Medications   Medication Sig Dispense Refill    Ascorbic Acid ([employer]S VITAMIN C) 500 MG CHEW Chew 1 tablet 2 (two) times a day        calcium carbonate-vitamin D (OSCAL-D) 500 mg-200 units per tablet Take 1 tablet by mouth 2 (two) times a day with meals        cyanocobalamin (VITAMIN B-12) 500 mcg tablet Take 500 mcg by mouth daily        ferrous sulfate (CVS IRON) 325 (65 Fe) mg tablet Take 325 mg by mouth 2 (two) times a day        multivitamin (THERAGRAN) TABS Take 1 tablet by mouth 2 (two) times a day        oxyCODONE-acetaminophen (PERCOCET) 5-325 mg per tablet Take 1 tablet by mouth every 4 (four) hours as needed for moderate pain for up to 6 doses Max Daily Amount: 6 tablets (Patient not taking: Reported on 8/23/2018 ) 6 tablet 0    sucralfate (CARAFATE) 1 g/10 mL suspension Take 10 mL by mouth 4 (four) times a day (Patient not taking: Reported on 8/23/2018 ) 420 mL 0      No current facility-administered medications for this visit             Current Outpatient Medications on File Prior to Visit   Medication Sig    Ascorbic Acid (CVS VITAMIN C) 500 MG CHEW Chew 1 tablet 2 (two) times a day    calcium carbonate-vitamin D (OSCAL-D) 500 mg-200 units per tablet Take 1 tablet by mouth 2 (two) times a day with meals    cyanocobalamin (VITAMIN B-12) 500 mcg tablet Take 500 mcg by mouth daily    ferrous sulfate (CVS IRON) 325 (65 Fe) mg tablet Take 325 mg by mouth 2 (two) times a day    multivitamin (THERAGRAN) TABS Take 1 tablet by mouth 2 (two) times a day    oxyCODONE-acetaminophen (PERCOCET) 5-325 mg per tablet Take 1 tablet by mouth every 4 (four) hours as needed for moderate pain for up to 6 doses Max Daily Amount: 6 tablets (Patient not taking: Reported on 8/23/2018 )    sucralfate (CARAFATE) 1 g/10 mL suspension Take 10 mL by mouth 4 (four) times a day (Patient not taking: Reported on 8/23/2018 )      No current facility-administered medications on file prior to visit        She is allergic to pollen extract        Review of Systems   Constitutional: Negative  Gastrointestinal: Negative  Genitourinary: Positive for menstrual problem and pelvic pain  Negative for difficulty urinating, dyspareunia, dysuria, frequency, hematuria, urgency, vaginal discharge and vaginal pain           Objective:        /88 (BP Location: Left arm)   Ht 5' 6" (1 676 m)   Wt 72 3 kg (159 lb 6 4 oz)   LMP 04/10/2019 (Exact Date)   BMI 25 73 kg/m²             Physical Exam   Constitutional: She appears well-developed and well-nourished  Neck: Normal range of motion  Neck supple  No thyromegaly present  Cardiovascular: Normal rate, regular rhythm and normal heart sounds  Pulmonary/Chest: Effort normal and breath sounds normal  No respiratory distress  Right breast exhibits no inverted nipple, no mass, no nipple discharge, no skin change and no tenderness  Left breast exhibits no inverted nipple, no mass, no nipple discharge, no skin change and no tenderness  Bilateral implants   Abdominal: Soft  Bowel sounds are normal  She exhibits no distension and no mass  There is no tenderness  There is no rebound and no guarding  No hernia  Hernia confirmed negative in the right inguinal area and confirmed negative in the left inguinal area  Genitourinary: There is no rash or lesion on the right labia  There is no rash or lesion on the left labia   Uterus is not deviated, not enlarged, not fixed and not tender  Cervix exhibits no motion tenderness, no discharge and no friability  Right adnexum displays no mass, no tenderness and no fullness  Left adnexum displays no mass, no tenderness and no fullness  No bleeding in the vagina  No vaginal discharge found  Lymphadenopathy: No inguinal adenopathy noted on the right or left side

## 2019-09-11 ENCOUNTER — CONSULT (OUTPATIENT)
Dept: INTERNAL MEDICINE CLINIC | Facility: CLINIC | Age: 44
End: 2019-09-11
Payer: COMMERCIAL

## 2019-09-11 VITALS
OXYGEN SATURATION: 96 % | TEMPERATURE: 98.6 F | HEART RATE: 76 BPM | HEIGHT: 67 IN | DIASTOLIC BLOOD PRESSURE: 80 MMHG | BODY MASS INDEX: 23.07 KG/M2 | WEIGHT: 147 LBS | SYSTOLIC BLOOD PRESSURE: 110 MMHG

## 2019-09-11 DIAGNOSIS — Z98.84 BARIATRIC SURGERY STATUS: ICD-10-CM

## 2019-09-11 DIAGNOSIS — D50.8 OTHER IRON DEFICIENCY ANEMIA: ICD-10-CM

## 2019-09-11 DIAGNOSIS — Z01.818 PREOP EXAMINATION: Primary | ICD-10-CM

## 2019-09-11 DIAGNOSIS — K91.2 POSTSURGICAL MALABSORPTION: ICD-10-CM

## 2019-09-11 DIAGNOSIS — M89.8X7 EXOSTOSIS OF RIGHT FOOT: ICD-10-CM

## 2019-09-11 PROCEDURE — 99242 OFF/OP CONSLTJ NEW/EST SF 20: CPT | Performed by: INTERNAL MEDICINE

## 2019-09-11 PROCEDURE — 93000 ELECTROCARDIOGRAM COMPLETE: CPT | Performed by: INTERNAL MEDICINE

## 2019-09-11 NOTE — H&P (VIEW-ONLY)
Subjective:    Bo Orellana is a 40y o  year old female who presents to the office today for a preoperative consultation at the request of surgeon Dr Carine Martinez who plans on performing right foot bone spur removal/section on October 2  This consultation is requested for the specific conditions prompting preoperative evaluation (i e  because of potential affect on operative risk)  Planned anesthesia: general  The patient has no known anesthesia issues  Patients bleeding risk: no recent abnormal bleeding  Patient does not have objections to receiving blood products if needed  Patient is able to walk 4 blocks without symptoms  Patient is able to walk up 2 flights of stairs without symptoms  Significant past medical history includes status post gastric bypass, iron deficiency anemia  Tobacco use:  Negative  Alcohol use:  Social  Illicit drug use:  Negative    Symptoms:   Easy bleeding: no  Easy bruising: no  Frequent nose bleeds: no  Chest pain: no  Cough: no  Dyspnea on exertion:no  Edema: no  Palpitations: no  Wheezing: no    Living situation: Patient lives with her parents in a residential home  Her parents will be caring for her after surgery  shedoes not have post-op concerns  The following portions of the patient's history were reviewed and updated as appropriate: allergies, current medications, past family history, past medical history, past social history, past surgical history and problem list     Review of Systems  Review of Systems   Constitutional: Negative for activity change, appetite change, chills, diaphoresis, fatigue and fever  HENT: Negative for congestion, postnasal drip, rhinorrhea, sinus pressure, sinus pain, sneezing and sore throat  Eyes: Negative for visual disturbance  Respiratory: Negative for apnea, cough, choking, chest tightness, shortness of breath and wheezing  Cardiovascular: Negative for chest pain, palpitations and leg swelling     Gastrointestinal: Negative for abdominal distention, abdominal pain, anal bleeding, blood in stool, constipation, diarrhea, nausea and vomiting  Endocrine: Negative for cold intolerance and heat intolerance  Genitourinary: Negative for difficulty urinating, dysuria and hematuria  Musculoskeletal: Negative  Skin: Negative  Neurological: Negative for dizziness, weakness, light-headedness, numbness and headaches  Hematological: Negative for adenopathy  Psychiatric/Behavioral: Negative for agitation, sleep disturbance and suicidal ideas  All other systems reviewed and are negative          Past Medical History:   Diagnosis Date    Bulla of lung ([address] )     Coccydynia     Family history of vitamin B12 deficiency     Fibroid, uterine     Hematuria     last assessed: 2013    History of hypertension     History of morbid obesity     History of vitamin B deficiency     History of vitamin D deficiency     Hypocalcemia     Hypocalcemia     Iron deficiency     Iron deficiency     Kyphosis deformity of spine     Menopause     Migraine     Morbid (severe) obesity due to excess calories ([address] )     per [employer]-last assessed: 2014    Narrow angle glaucoma suspect of both eyes     PONV (postoperative nausea and vomiting)     nausea only    Postgastrectomy malabsorption     Vitamin A deficiency     last assessed: 2014     Past Surgical History:   Procedure Laterality Date    APPENDECTOMY      AUGMENTATION MAMMAPLASTY      BRAIN SURGERY      AVM    BREAST BIOPSY      BREAST LUMPECTOMY Right 1992     SECTION      COSMETIC SURGERY      tummy tuck,breast implants and lift    ENDOMETRIAL ABLATION  2018    REFRACTIVE SURGERY      glaucoma    HITESH-EN-Y PROCEDURE  2014    TONSILLECTOMY AND ADENOIDECTOMY      including adenoids    TOOTH EXTRACTION      TUBAL LIGATION      US GUIDANCE BREAST BIOPSY LEFT EACH ADDITIONAL Left 10/12/2018    US GUIDED BREAST BIOPSY LEFT COMPLETE Left 10/12/2018     Social History     Tobacco Use    Smoking status: Never Smoker    Smokeless tobacco: Never Used   Substance Use Topics    Alcohol use: Yes     Alcohol/week: 1 0 standard drinks     Types: 1 Standard drinks or equivalent per week     Frequency: Monthly or less     Drinks per session: 1 or 2     Binge frequency: Never     Comment: social    Drug use: No     Family History   Problem Relation Age of Onset    Hypertension Mother     Atrial fibrillation Mother     Melanoma Mother     Cancer Mother         on left upper chest    Graves' disease Mother     Hypertension Father     Atrial fibrillation Father     Diabetes Father     Obesity Father     Allergies Father     Heart disease Father     Stomach cancer Maternal Aunt     Other Maternal Aunt         GIST, malignant-per [employer]    Stomach cancer Paternal Aunt     Cancer Maternal Grandfather         Lung    Stroke Paternal Grandmother     Cancer Paternal Grandfather         Lung    Depression Neg Hx     Anxiety disorder Neg Hx     Drug abuse Neg Hx      Pollen extract  Current Outpatient Medications   Medication Sig Dispense Refill    Ascorbic Acid (CVS VITAMIN C) 500 MG CHEW Chew 1 tablet 2 (two) times a day      butalbital-acetaminophen-caffeine (FIORICET,ESGIC) -40 mg per tablet Take 1 tablet by mouth every 4 (four) hours as needed for headaches 30 tablet 0    calcium carbonate-vitamin D (OSCAL-D) 500 mg-200 units per tablet Take 1 tablet by mouth 2 (two) times a day with meals      cyanocobalamin (VITAMIN B-12) 500 mcg tablet Take 500 mcg by mouth daily      ferrous sulfate (CVS IRON) 325 (65 Fe) mg tablet Take 325 mg by mouth 2 (two) times a day      multivitamin (THERAGRAN) TABS Take 1 tablet by mouth 2 (two) times a day       No current facility-administered medications for this visit          Objective:       /80 (BP Location: Left arm, Patient Position: Sitting, Cuff Size: Adult)   Pulse 76   Temp 98 6 °F (37 °C) (Oral)   Ht 5' 7" (1 702 m)   Wt 66 7 kg (147 lb)   LMP 09/09/2019   SpO2 96% Comment: room air  Breastfeeding? Yes   BMI 23 02 kg/m²   Physical Exam   Constitutional: She is oriented to person, place, and time  She appears well-developed and well-nourished  No distress  HENT:   Head: Normocephalic and atraumatic  Eyes: Pupils are equal, round, and reactive to light  Conjunctivae and EOM are normal  Right eye exhibits no discharge  Left eye exhibits no discharge  Neck: Normal range of motion  Neck supple  No JVD present  No thyromegaly present  Cardiovascular: Normal rate, regular rhythm, normal heart sounds and intact distal pulses  Exam reveals no gallop and no friction rub  No murmur heard  Pulmonary/Chest: Effort normal and breath sounds normal  No respiratory distress  She has no wheezes  She has no rales  She exhibits no tenderness  Abdominal: Soft  She exhibits no distension  There is no tenderness  Musculoskeletal: Normal range of motion  She exhibits no edema, tenderness or deformity  Lymphadenopathy:     She has no cervical adenopathy  Neurological: She is alert and oriented to person, place, and time  No cranial nerve deficit  Coordination normal    Skin: Skin is warm and dry  No rash noted  She is not diaphoretic  No erythema  No pallor  Psychiatric: She has a normal mood and affect  Her behavior is normal  Judgment and thought content normal    Nursing note and vitals reviewed             Cardiographics  ECG: normal sinus rhythm, no blocks or conduction defects, no ischemic changes      Lab Review   Pending pre-op lab studies     Appointment on 09/05/2019   Component Date Value    Hemoglobin A1C 09/05/2019 5 1     EAG 09/05/2019 100    Appointment on 08/18/2019   Component Date Value    Ferritin 08/18/2019 14     Iron Saturation 08/18/2019 24     TIBC 08/18/2019 385     Iron 08/18/2019 93         Assessment:     40 y o  female with planned surgery as above     Known risk factors for perioperative complications: None      Cardiac Risk Estimation:  Low risk    Catherine Ovalle is cleared from a cardiovascular standpoint to proceed with surgery  she is at a low risk from a cardiovascular standpoint at this time without any additional cardiac testing  Reevaluation needed if he should present with symptoms prior to surgery  Plan:  Preoperative workup as follows none  Change in medication regimen before surgery: none, continue medication regimen including morning of surgery, with sip of water

## 2019-09-11 NOTE — PROGRESS NOTES
Subjective:    Tamea Ahumada is a 40y o  year old female who presents to the office today for a preoperative consultation at the request of surgeon Dr Aileen Muñoz who plans on performing right foot bone spur removal/section on October 2  This consultation is requested for the specific conditions prompting preoperative evaluation (i e  because of potential affect on operative risk)  Planned anesthesia: general  The patient has no known anesthesia issues  Patients bleeding risk: no recent abnormal bleeding  Patient does not have objections to receiving blood products if needed  Patient is able to walk 4 blocks without symptoms  Patient is able to walk up 2 flights of stairs without symptoms  Significant past medical history includes status post gastric bypass, iron deficiency anemia  Tobacco use:  Negative  Alcohol use:  Social  Illicit drug use:  Negative    Symptoms:   Easy bleeding: no  Easy bruising: no  Frequent nose bleeds: no  Chest pain: no  Cough: no  Dyspnea on exertion:no  Edema: no  Palpitations: no  Wheezing: no    Living situation: Patient lives with her parents in a residential home  Her parents will be caring for her after surgery  shedoes not have post-op concerns  The following portions of the patient's history were reviewed and updated as appropriate: allergies, current medications, past family history, past medical history, past social history, past surgical history and problem list     Review of Systems  Review of Systems   Constitutional: Negative for activity change, appetite change, chills, diaphoresis, fatigue and fever  HENT: Negative for congestion, postnasal drip, rhinorrhea, sinus pressure, sinus pain, sneezing and sore throat  Eyes: Negative for visual disturbance  Respiratory: Negative for apnea, cough, choking, chest tightness, shortness of breath and wheezing  Cardiovascular: Negative for chest pain, palpitations and leg swelling     Gastrointestinal: Negative for abdominal distention, abdominal pain, anal bleeding, blood in stool, constipation, diarrhea, nausea and vomiting  Endocrine: Negative for cold intolerance and heat intolerance  Genitourinary: Negative for difficulty urinating, dysuria and hematuria  Musculoskeletal: Negative  Skin: Negative  Neurological: Negative for dizziness, weakness, light-headedness, numbness and headaches  Hematological: Negative for adenopathy  Psychiatric/Behavioral: Negative for agitation, sleep disturbance and suicidal ideas  All other systems reviewed and are negative          Past Medical History:   Diagnosis Date    Bulla of lung ([address] )     Coccydynia     Family history of vitamin B12 deficiency     Fibroid, uterine     Hematuria     last assessed: 2013    History of hypertension     History of morbid obesity     History of vitamin B deficiency     History of vitamin D deficiency     Hypocalcemia     Hypocalcemia     Iron deficiency     Iron deficiency     Kyphosis deformity of spine     Menopause     Migraine     Morbid (severe) obesity due to excess calories ([address] )     per [employer]-last assessed: 2014    Narrow angle glaucoma suspect of both eyes     PONV (postoperative nausea and vomiting)     nausea only    Postgastrectomy malabsorption     Vitamin A deficiency     last assessed: 2014     Past Surgical History:   Procedure Laterality Date    APPENDECTOMY      AUGMENTATION MAMMAPLASTY      BRAIN SURGERY      AVM    BREAST BIOPSY      BREAST LUMPECTOMY Right 1992     SECTION      COSMETIC SURGERY      tummy tuck,breast implants and lift    ENDOMETRIAL ABLATION  2018    REFRACTIVE SURGERY      glaucoma    HITESH-EN-Y PROCEDURE  2014    TONSILLECTOMY AND ADENOIDECTOMY      including adenoids    TOOTH EXTRACTION      TUBAL LIGATION      US GUIDANCE BREAST BIOPSY LEFT EACH ADDITIONAL Left 10/12/2018    US GUIDED BREAST BIOPSY LEFT COMPLETE Left 10/12/2018     Social History     Tobacco Use    Smoking status: Never Smoker    Smokeless tobacco: Never Used   Substance Use Topics    Alcohol use: Yes     Alcohol/week: 1 0 standard drinks     Types: 1 Standard drinks or equivalent per week     Frequency: Monthly or less     Drinks per session: 1 or 2     Binge frequency: Never     Comment: social    Drug use: No     Family History   Problem Relation Age of Onset    Hypertension Mother     Atrial fibrillation Mother     Melanoma Mother     Cancer Mother         on left upper chest    Graves' disease Mother     Hypertension Father     Atrial fibrillation Father     Diabetes Father     Obesity Father     Allergies Father     Heart disease Father     Stomach cancer Maternal Aunt     Other Maternal Aunt         GIST, malignant-per [employer]    Stomach cancer Paternal Aunt     Cancer Maternal Grandfather         Lung    Stroke Paternal Grandmother     Cancer Paternal Grandfather         Lung    Depression Neg Hx     Anxiety disorder Neg Hx     Drug abuse Neg Hx      Pollen extract  Current Outpatient Medications   Medication Sig Dispense Refill    Ascorbic Acid ([employer]S VITAMIN C) 500 MG CHEW Chew 1 tablet 2 (two) times a day      butalbital-acetaminophen-caffeine (FIORICET,ESGIC) -40 mg per tablet Take 1 tablet by mouth every 4 (four) hours as needed for headaches 30 tablet 0    calcium carbonate-vitamin D (OSCAL-D) 500 mg-200 units per tablet Take 1 tablet by mouth 2 (two) times a day with meals      cyanocobalamin (VITAMIN B-12) 500 mcg tablet Take 500 mcg by mouth daily      ferrous sulfate ([employer]S IRON) 325 (65 Fe) mg tablet Take 325 mg by mouth 2 (two) times a day      multivitamin (THERAGRAN) TABS Take 1 tablet by mouth 2 (two) times a day       No current facility-administered medications for this visit          Objective:       /80 (BP Location: Left arm, Patient Position: Sitting, Cuff Size: Adult)   Pulse 76   Temp 98 6 °F (37 °C) (Oral)   Ht 5' 7" (1 702 m)   Wt 66 7 kg (147 lb)   LMP 09/09/2019   SpO2 96% Comment: room air  Breastfeeding? Yes   BMI 23 02 kg/m²   Physical Exam   Constitutional: She is oriented to person, place, and time  She appears well-developed and well-nourished  No distress  HENT:   Head: Normocephalic and atraumatic  Eyes: Pupils are equal, round, and reactive to light  Conjunctivae and EOM are normal  Right eye exhibits no discharge  Left eye exhibits no discharge  Neck: Normal range of motion  Neck supple  No JVD present  No thyromegaly present  Cardiovascular: Normal rate, regular rhythm, normal heart sounds and intact distal pulses  Exam reveals no gallop and no friction rub  No murmur heard  Pulmonary/Chest: Effort normal and breath sounds normal  No respiratory distress  She has no wheezes  She has no rales  She exhibits no tenderness  Abdominal: Soft  She exhibits no distension  There is no tenderness  Musculoskeletal: Normal range of motion  She exhibits no edema, tenderness or deformity  Lymphadenopathy:     She has no cervical adenopathy  Neurological: She is alert and oriented to person, place, and time  No cranial nerve deficit  Coordination normal    Skin: Skin is warm and dry  No rash noted  She is not diaphoretic  No erythema  No pallor  Psychiatric: She has a normal mood and affect  Her behavior is normal  Judgment and thought content normal    Nursing note and vitals reviewed             Cardiographics  ECG: normal sinus rhythm, no blocks or conduction defects, no ischemic changes      Lab Review   Pending pre-op lab studies     Appointment on 09/05/2019   Component Date Value    Hemoglobin A1C 09/05/2019 5 1     EAG 09/05/2019 100    Appointment on 08/18/2019   Component Date Value    Ferritin 08/18/2019 14     Iron Saturation 08/18/2019 24     TIBC 08/18/2019 385     Iron 08/18/2019 93         Assessment:     40 y o  female with planned surgery as above     Known risk factors for perioperative complications: None      Cardiac Risk Estimation:  Low risk    Kristie Oconnor is cleared from a cardiovascular standpoint to proceed with surgery  she is at a low risk from a cardiovascular standpoint at this time without any additional cardiac testing  Reevaluation needed if he should present with symptoms prior to surgery  Plan:  Preoperative workup as follows none  Change in medication regimen before surgery: none, continue medication regimen including morning of surgery, with sip of water

## 2019-09-13 ENCOUNTER — ANESTHESIA EVENT (OUTPATIENT)
Dept: PERIOP | Facility: HOSPITAL | Age: 44
End: 2019-09-13
Payer: COMMERCIAL

## 2019-09-16 ENCOUNTER — ANESTHESIA (OUTPATIENT)
Dept: PERIOP | Facility: HOSPITAL | Age: 44
End: 2019-09-16
Payer: COMMERCIAL

## 2019-09-16 ENCOUNTER — HOSPITAL ENCOUNTER (OUTPATIENT)
Facility: HOSPITAL | Age: 44
Setting detail: OUTPATIENT SURGERY
Discharge: HOME | End: 2019-09-16
Attending: OBSTETRICS & GYNECOLOGY | Admitting: OBSTETRICS & GYNECOLOGY
Payer: COMMERCIAL

## 2019-09-16 VITALS
BODY MASS INDEX: 23.07 KG/M2 | SYSTOLIC BLOOD PRESSURE: 109 MMHG | TEMPERATURE: 97.5 F | OXYGEN SATURATION: 97 % | RESPIRATION RATE: 15 BRPM | DIASTOLIC BLOOD PRESSURE: 67 MMHG | HEART RATE: 72 BPM | WEIGHT: 147 LBS | HEIGHT: 67 IN

## 2019-09-16 DIAGNOSIS — N94.6 DYSMENORRHEA: ICD-10-CM

## 2019-09-16 DIAGNOSIS — N80.0 ADENOMYOSIS: ICD-10-CM

## 2019-09-16 PROBLEM — Z90.711 STATUS POST LAPAROSCOPIC SUPRACERVICAL HYSTERECTOMY: Status: ACTIVE | Noted: 2019-09-16

## 2019-09-16 LAB
ABO GROUP BLD: NORMAL
BLD GP AB SCN SERPL QL: NEGATIVE
EXT PREGNANCY TEST URINE: NEGATIVE
EXT. CONTROL: NORMAL
RH BLD: POSITIVE
SPECIMEN EXPIRATION DATE: NORMAL

## 2019-09-16 PROCEDURE — 88307 TISSUE EXAM BY PATHOLOGIST: CPT | Performed by: PATHOLOGY

## 2019-09-16 PROCEDURE — 88304 TISSUE EXAM BY PATHOLOGIST: CPT | Performed by: PATHOLOGY

## 2019-09-16 PROCEDURE — 51798 US URINE CAPACITY MEASURE: CPT | Performed by: OBSTETRICS & GYNECOLOGY

## 2019-09-16 PROCEDURE — 58542 LSH W/T/O UT 250 G OR LESS: CPT | Performed by: OBSTETRICS & GYNECOLOGY

## 2019-09-16 PROCEDURE — 81025 URINE PREGNANCY TEST: CPT | Performed by: ANESTHESIOLOGY

## 2019-09-16 PROCEDURE — 58662 LAPAROSCOPY EXCISE LESIONS: CPT | Performed by: OBSTETRICS & GYNECOLOGY

## 2019-09-16 PROCEDURE — 86900 BLOOD TYPING SEROLOGIC ABO: CPT | Performed by: OBSTETRICS & GYNECOLOGY

## 2019-09-16 PROCEDURE — 86901 BLOOD TYPING SEROLOGIC RH(D): CPT | Performed by: OBSTETRICS & GYNECOLOGY

## 2019-09-16 PROCEDURE — 86850 RBC ANTIBODY SCREEN: CPT | Performed by: OBSTETRICS & GYNECOLOGY

## 2019-09-16 RX ORDER — PROPOFOL 10 MG/ML
INJECTION, EMULSION INTRAVENOUS AS NEEDED
Status: DISCONTINUED | OUTPATIENT
Start: 2019-09-16 | End: 2019-09-16 | Stop reason: SURG

## 2019-09-16 RX ORDER — OXYCODONE HYDROCHLORIDE AND ACETAMINOPHEN 5; 325 MG/1; MG/1
1 TABLET ORAL EVERY 4 HOURS PRN
Qty: 20 TABLET | Refills: 0 | Status: SHIPPED | OUTPATIENT
Start: 2019-09-16 | End: 2019-09-26

## 2019-09-16 RX ORDER — ACETAMINOPHEN 500 MG
500 TABLET ORAL EVERY 6 HOURS PRN
COMMUNITY
End: 2021-08-14

## 2019-09-16 RX ORDER — SCOLOPAMINE TRANSDERMAL SYSTEM 1 MG/1
1 PATCH, EXTENDED RELEASE TRANSDERMAL ONCE AS NEEDED
Status: DISCONTINUED | OUTPATIENT
Start: 2019-09-16 | End: 2019-09-16

## 2019-09-16 RX ORDER — MIDAZOLAM HYDROCHLORIDE 1 MG/ML
INJECTION INTRAMUSCULAR; INTRAVENOUS AS NEEDED
Status: DISCONTINUED | OUTPATIENT
Start: 2019-09-16 | End: 2019-09-16 | Stop reason: SURG

## 2019-09-16 RX ORDER — MAGNESIUM HYDROXIDE 1200 MG/15ML
LIQUID ORAL AS NEEDED
Status: DISCONTINUED | OUTPATIENT
Start: 2019-09-16 | End: 2019-09-16 | Stop reason: HOSPADM

## 2019-09-16 RX ORDER — NEOSTIGMINE METHYLSULFATE 1 MG/ML
INJECTION INTRAVENOUS AS NEEDED
Status: DISCONTINUED | OUTPATIENT
Start: 2019-09-16 | End: 2019-09-16 | Stop reason: SURG

## 2019-09-16 RX ORDER — CEFAZOLIN SODIUM 1 G/50ML
1000 SOLUTION INTRAVENOUS ONCE
Status: COMPLETED | OUTPATIENT
Start: 2019-09-16 | End: 2019-09-16

## 2019-09-16 RX ORDER — FENTANYL CITRATE 50 UG/ML
INJECTION, SOLUTION INTRAMUSCULAR; INTRAVENOUS AS NEEDED
Status: DISCONTINUED | OUTPATIENT
Start: 2019-09-16 | End: 2019-09-16 | Stop reason: SURG

## 2019-09-16 RX ORDER — FENTANYL CITRATE 50 UG/ML
25 INJECTION, SOLUTION INTRAMUSCULAR; INTRAVENOUS
Status: DISCONTINUED | OUTPATIENT
Start: 2019-09-16 | End: 2019-09-16 | Stop reason: HOSPADM

## 2019-09-16 RX ORDER — PROMETHAZINE HYDROCHLORIDE 25 MG/ML
12.5 INJECTION, SOLUTION INTRAMUSCULAR; INTRAVENOUS EVERY 4 HOURS PRN
Status: DISCONTINUED | OUTPATIENT
Start: 2019-09-16 | End: 2019-09-16 | Stop reason: HOSPADM

## 2019-09-16 RX ORDER — SODIUM CHLORIDE 9 MG/ML
125 INJECTION, SOLUTION INTRAVENOUS CONTINUOUS
Status: DISCONTINUED | OUTPATIENT
Start: 2019-09-16 | End: 2019-09-16 | Stop reason: HOSPADM

## 2019-09-16 RX ORDER — ONDANSETRON 2 MG/ML
4 INJECTION INTRAMUSCULAR; INTRAVENOUS EVERY 6 HOURS PRN
Status: DISCONTINUED | OUTPATIENT
Start: 2019-09-16 | End: 2019-09-16 | Stop reason: HOSPADM

## 2019-09-16 RX ORDER — OXYCODONE HYDROCHLORIDE 5 MG/1
5 TABLET ORAL EVERY 4 HOURS PRN
Status: DISCONTINUED | OUTPATIENT
Start: 2019-09-16 | End: 2019-09-16 | Stop reason: HOSPADM

## 2019-09-16 RX ORDER — IBUPROFEN 600 MG/1
600 TABLET ORAL EVERY 6 HOURS PRN
Status: DISCONTINUED | OUTPATIENT
Start: 2019-09-16 | End: 2019-09-16 | Stop reason: HOSPADM

## 2019-09-16 RX ORDER — KETOROLAC TROMETHAMINE 30 MG/ML
INJECTION, SOLUTION INTRAMUSCULAR; INTRAVENOUS AS NEEDED
Status: DISCONTINUED | OUTPATIENT
Start: 2019-09-16 | End: 2019-09-16 | Stop reason: SURG

## 2019-09-16 RX ORDER — OXYCODONE HYDROCHLORIDE 5 MG/1
10 TABLET ORAL EVERY 4 HOURS PRN
Status: DISCONTINUED | OUTPATIENT
Start: 2019-09-16 | End: 2019-09-16 | Stop reason: HOSPADM

## 2019-09-16 RX ORDER — GLYCOPYRROLATE 0.2 MG/ML
INJECTION INTRAMUSCULAR; INTRAVENOUS AS NEEDED
Status: DISCONTINUED | OUTPATIENT
Start: 2019-09-16 | End: 2019-09-16 | Stop reason: SURG

## 2019-09-16 RX ORDER — HYDROMORPHONE HCL/PF 1 MG/ML
0.5 SYRINGE (ML) INJECTION
Status: DISCONTINUED | OUTPATIENT
Start: 2019-09-16 | End: 2019-09-16 | Stop reason: HOSPADM

## 2019-09-16 RX ORDER — ONDANSETRON 2 MG/ML
INJECTION INTRAMUSCULAR; INTRAVENOUS AS NEEDED
Status: DISCONTINUED | OUTPATIENT
Start: 2019-09-16 | End: 2019-09-16 | Stop reason: SURG

## 2019-09-16 RX ORDER — ACETAMINOPHEN 325 MG/1
650 TABLET ORAL EVERY 6 HOURS PRN
Status: DISCONTINUED | OUTPATIENT
Start: 2019-09-16 | End: 2019-09-16 | Stop reason: HOSPADM

## 2019-09-16 RX ORDER — SODIUM CHLORIDE 9 MG/ML
125 INJECTION, SOLUTION INTRAVENOUS CONTINUOUS
Status: DISCONTINUED | OUTPATIENT
Start: 2019-09-16 | End: 2019-09-16

## 2019-09-16 RX ORDER — ROCURONIUM BROMIDE 10 MG/ML
INJECTION, SOLUTION INTRAVENOUS AS NEEDED
Status: DISCONTINUED | OUTPATIENT
Start: 2019-09-16 | End: 2019-09-16 | Stop reason: SURG

## 2019-09-16 RX ORDER — DEXAMETHASONE SODIUM PHOSPHATE 4 MG/ML
INJECTION, SOLUTION INTRA-ARTICULAR; INTRALESIONAL; INTRAMUSCULAR; INTRAVENOUS; SOFT TISSUE AS NEEDED
Status: DISCONTINUED | OUTPATIENT
Start: 2019-09-16 | End: 2019-09-16 | Stop reason: SURG

## 2019-09-16 RX ORDER — SODIUM CHLORIDE 9 MG/ML
INJECTION, SOLUTION INTRAVENOUS CONTINUOUS PRN
Status: DISCONTINUED | OUTPATIENT
Start: 2019-09-16 | End: 2019-09-16 | Stop reason: SURG

## 2019-09-16 RX ADMIN — FENTANYL CITRATE 50 MCG: 50 INJECTION, SOLUTION INTRAMUSCULAR; INTRAVENOUS at 07:31

## 2019-09-16 RX ADMIN — FENTANYL CITRATE 25 MCG: 50 INJECTION INTRAMUSCULAR; INTRAVENOUS at 09:43

## 2019-09-16 RX ADMIN — MIDAZOLAM 2 MG: 1 INJECTION INTRAMUSCULAR; INTRAVENOUS at 07:23

## 2019-09-16 RX ADMIN — FENTANYL CITRATE 50 MCG: 50 INJECTION, SOLUTION INTRAMUSCULAR; INTRAVENOUS at 09:18

## 2019-09-16 RX ADMIN — ROCURONIUM BROMIDE 40 MG: 100 INJECTION, SOLUTION INTRAVENOUS at 07:31

## 2019-09-16 RX ADMIN — PROPOFOL 50 MG: 10 INJECTION, EMULSION INTRAVENOUS at 07:32

## 2019-09-16 RX ADMIN — KETOROLAC TROMETHAMINE 30 MG: 30 INJECTION, SOLUTION INTRAMUSCULAR at 08:52

## 2019-09-16 RX ADMIN — FENTANYL CITRATE 25 MCG: 50 INJECTION INTRAMUSCULAR; INTRAVENOUS at 09:33

## 2019-09-16 RX ADMIN — ROCURONIUM BROMIDE 10 MG: 100 INJECTION, SOLUTION INTRAVENOUS at 08:11

## 2019-09-16 RX ADMIN — SODIUM CHLORIDE: 0.9 INJECTION, SOLUTION INTRAVENOUS at 07:34

## 2019-09-16 RX ADMIN — FENTANYL CITRATE 50 MCG: 50 INJECTION, SOLUTION INTRAMUSCULAR; INTRAVENOUS at 08:28

## 2019-09-16 RX ADMIN — DEXAMETHASONE SODIUM PHOSPHATE 4 MG: 4 INJECTION, SOLUTION INTRAMUSCULAR; INTRAVENOUS at 07:38

## 2019-09-16 RX ADMIN — NEOSTIGMINE METHYLSULFATE 3 MG: 1 INJECTION, SOLUTION INTRAVENOUS at 08:52

## 2019-09-16 RX ADMIN — SODIUM CHLORIDE: 0.9 INJECTION, SOLUTION INTRAVENOUS at 07:38

## 2019-09-16 RX ADMIN — GLYCOPYRROLATE 0.6 MG: 0.2 INJECTION INTRAMUSCULAR; INTRAVENOUS at 08:52

## 2019-09-16 RX ADMIN — SODIUM CHLORIDE 125 ML/HR: 0.9 INJECTION, SOLUTION INTRAVENOUS at 06:47

## 2019-09-16 RX ADMIN — CEFAZOLIN SODIUM 1000 MG: 1 SOLUTION INTRAVENOUS at 07:20

## 2019-09-16 RX ADMIN — PROPOFOL 150 MG: 10 INJECTION, EMULSION INTRAVENOUS at 07:31

## 2019-09-16 RX ADMIN — SCOPALAMINE 1 PATCH: 1 PATCH, EXTENDED RELEASE TRANSDERMAL at 07:06

## 2019-09-16 RX ADMIN — FENTANYL CITRATE 25 MCG: 50 INJECTION, SOLUTION INTRAMUSCULAR; INTRAVENOUS at 09:06

## 2019-09-16 RX ADMIN — ONDANSETRON 4 MG: 2 INJECTION INTRAMUSCULAR; INTRAVENOUS at 07:38

## 2019-09-16 NOTE — DISCHARGE INSTRUCTIONS
Laparoscopic Hysterectomy   WHAT YOU SHOULD KNOW:   Laparoscopic hysterectomy [facility]) is surgery to remove your uterus only (partial hysterectomy), or your uterus and cervix (total hysterectomy)  Other organs, such as your ovaries and fallopian tubes, may also be removed  AFTER YOU LEAVE:   Medicines:   · Medicines  may be given to decrease pain or to treat or prevent a bacterial infection  Ask your primary healthcare provider [facility]) how to take prescription pain medicine safely  You may also need to take hormone medicine, such as estrogen  · Take your medicine as directed  Contact your PHP if you think your medicine is not helping or if you have side effects  Tell him if you are allergic to any medicine  Keep a list of the medicines, vitamins, and herbs you take  Include the amounts, and when and why you take them  Bring the list or the pill bottles to follow-up visits  Carry your medicine list with you in case of an emergency  Activity guidelines: You may feel like resting more after surgery  Slowly start to do more each day  Rest when you feel it is needed  Ask when you can return to your usual activities  What to expect after surgery:   · Ask your gynecologist or PHP about routine tests that you will need  · It is normal to have some bleeding from your vagina after certain types of hysterectomy surgery  Ask your gynecologist or PHP if you should expect bleeding, and how much bleeding to expect  Contact your gynecologist or PHP if:   · You have a fever  · You have pain that gets worse or does not decrease or go away with treatment  · You notice pus or a foul-smelling drainage coming from an incision, or it is red or swollen  · You have new or more bright red bleeding from your vagina or your incisions  · You have yellow, green, or foul-smelling discharge coming from your vagina  · You have trouble urinating, burning when you urinate, or feel a need to urinate often      · You have trouble having a bowel movement  · Your skin is itchy, swollen, or has a rash  · You have questions or concerns about your condition or care  Seek care immediately or call 911 if:   · You are bleeding from your vagina, or bleeding more than you were told to expect  · Your arm or leg feels warm, tender, and painful  It may look swollen and red  · You feel lightheaded, short of breath, and have chest pain  · You cough up blood  © 2014 [address] is for End User's use only and may not be sold, redistributed or otherwise used for commercial purposes  All illustrations and images included in CareNotes® are the copyrighted property of A D A M , Inc  or Nils Palmer  The above information is an  only  It is not intended as medical advice for individual conditions or treatments  Talk to your doctor, nurse or pharmacist before following any medical regimen to see if it is safe and effective for you

## 2019-09-16 NOTE — OP NOTE
OPERATIVE REPORT  PATIENT NAME: Simon Durand    :  1975  MRN: [id number]  Pt Location: [facility] OR ROOM 01    SURGERY DATE: 2019    Surgeon(s) and Role:     Maria Ines Quintana,  - Primary     * Brittni Sumner MD - Assisting    Preop Diagnosis:  Adenomyosis [N80 0]  Dysmenorrhea [N94 6]    Post-Op Diagnosis Codes:     * Adenomyosis [N80 0]     * Dysmenorrhea [N94 6]    Procedure(s) (LRB):  HYSTERECTOMY LAPAROSCOPIC SUPRACERVICAL ([address] [place] [address] Street)  WITH B/L SALPINGECTOMY  EXCISION OF ENDOMETRIAL IMPLANT    Specimen(s):  ID Type Source Tests Collected by Time Destination   1 : uterus , bilateral fallopian tubes Tissue Uterus TISSUE Martin Alvarenga, DO 2019 0824    2 : endometrial cyst Tissue Endometrium TISSUE EXAM Alexandra Cruz, DO 2019 0851        Estimated Blood Loss:   200 mL    Drains:  * No LDAs found *    Anesthesia Type:   General    Operative Indications:  Adenomyosis [N80 0]  Dysmenorrhea [N94 6]      Operative Findings:  Normal appearing external female genitalia & vaginal mucosa  Small normal appearing cervix  Small, mobile, anteverted uterus, no palpable adnexal masses or fullness  On laparoscopic evaluation:   Abdominal survey completed without evidence of injury or pathology   Pelvic survey notable for normal appearing, small, anteverted uterus, normal appearing bilateral tubes and ovaries  Evidence of endometriosis with powderburn lesion on the right pelvic side wall    Complications:   None    Procedure and Technique:  The patient was identified in the holding area by the operating room staff and attending physician  She was taken to the operating room where anesthesia was instituted without complications  Appropriate preoperative antibiotics chosen per ACOG guidelines were given  Bilateral SCDs were placed in the lower extremities for DVT prevention prior to the institution of anesthesia    She was placed in the dorsal lithotomy position with the legs in [address] [place] [address] with care taken to avoid excessive flexion or extension of her lower extremities  The patient was prepped and draped in the usual sterile fashion  A time out was held to identify the proper patient and procedure  A weighted speculum was used to visualize the anterior lip of the cervix which was grasped with a single tooth tenaculum  The uterus was gently sounded to 6cm  The Kristen was placed  A Shaver catheter was inserted  Gloves were exchanged and attention was then turned to abdomen  Next, the deepest part of the umbilicus was grasped and everted with Allis clamps  A Veress needle was gently inserted into the umbilicus at a 45 degree angle  Once entry into the abdominal cavity was confirmed, the abdomen was insufflated with CO2 gas to 15 mmHg  Next, a 10 mm diagnostic laparoscope was inserted via direct entry into the umbilicus  The patient was then placed in Trendelenburg for better visualization of the pelvis  Next, a 5 mm trocar was inserted into the left lateral quadrant under direct visualization  Then a 5 mm trocar was inserted into the right lateral quadrant under direct visualization  First, a survey of the cavity was performed, and we confirmed the above-mentioned findings  We started a salpingectomy on the left side with the Ligasure device  The salpingectomy was performed by transecting the mesosalpinx of the right fallopian tube to the mesosalpinx to the level of the cornua  The left utero-ovarian ligament was coagulated and transected  The contralateral side was treated in the same manner  Next, the round ligament was opened on the right side, and the anterior leaf of the broad ligament was dissected toward the level of the internal cervical os to develop the bladder flap  The left round ligament was then entered in similar fashion  The bladder flap was completed  The uterine arteries were skeletonized, coagulated and transected bilaterally      Next, the bipolar Viktoriya loop was introduced and the loop tightened at the level of the cervical canal  Once the loop was cleared of bowel and other major structures, the loop was activated and the corpus of the uterus was amputated without difficulty  Next, a 2 5-cm incision was created at the level of the suprapubic area in the midline in a horizontal fashion  To introduce the Lazaro retractor  A 15mm specimen bag was placed into the abdomen through this minilaparotomy incision  Next, the Gelpoint cover was placed over the retractor to maintain pneumoperitoneum  Next, the specimen was placed into the bag and the tail of the bag pulled through the suprapubic incision  The edges were rolled down and it was confirmed that the specimen was safely in the bag  The specimen was morcellated manually without difficulty with the scalpel  The bag was then removed once the specimen was removed  The Lazaro retractor was removed  The fascia was closed with 0 Vicryl suture in a running stitch  The skin was closed with 4-0 Plain interrupted sutures  A final laparoscopic survey of the pelvic cavity and we confirmed good hemostasis after use of electrocautery  There was an endometrial powder burn implant measuring approximately 1x1cm on the right pelvic side wall which was excised using scissors and electrocautery  The surgical site was hemostatic  The trocars were then removed  The gas was allowed to escape  The skin incisions were closed with plain interrupted sutures without complications  Skin glue was applied to the incisions and they were dressed  The Shaver was removed  The patient tolerated the procedure well  The sponge, needle and instrument count were correct x 2  The patient tolerated the procedure well  She was awakened from anesthesia and transferred to the recovery room in stable condition  Dr Tye Villafana was present for the entire procedure      Patient Disposition:  PACU     SIGNATURE: Fanny Lakhani MD  DATE: September 16, 2019  TIME: 9:20 AM

## 2019-09-16 NOTE — PROGRESS NOTES
D/C instructions reviewed w/ pt & spouse, verbalized understanding  Seen by Dr Luis Morocho at bedside  3  trocars over abd & 1 suprapubic remain CD&I covered w/ bandaides, given extra for home  Ice packs refreshed  PO fluids encouraged  Voided 150ml + missed toilet a little, BS 55ml  Bladder feels empty  Rx filled at [facility]

## 2019-09-20 ENCOUNTER — TRANSCRIBE ORDERS (OUTPATIENT)
Dept: ADMINISTRATIVE | Facility: HOSPITAL | Age: 44
End: 2019-09-20

## 2019-09-20 ENCOUNTER — APPOINTMENT (OUTPATIENT)
Dept: LAB | Age: 44
End: 2019-09-20
Payer: COMMERCIAL

## 2019-09-20 DIAGNOSIS — Z01.89 ENCOUNTER FOR OTHER SPECIFIED SPECIAL EXAMINATIONS: Primary | ICD-10-CM

## 2019-09-20 DIAGNOSIS — Z01.89 ENCOUNTER FOR OTHER SPECIFIED SPECIAL EXAMINATIONS: ICD-10-CM

## 2019-09-20 LAB
ANION GAP SERPL CALCULATED.3IONS-SCNC: 5 MMOL/L (ref 4–13)
B-HCG SERPL-ACNC: <2 MIU/ML
BASOPHILS # BLD AUTO: 0.05 THOUSANDS/ΜL (ref 0–0.1)
BASOPHILS NFR BLD AUTO: 1 % (ref 0–1)
BUN SERPL-MCNC: 13 MG/DL (ref 5–25)
CALCIUM SERPL-MCNC: 9.7 MG/DL (ref 8.3–10.1)
CHLORIDE SERPL-SCNC: 109 MMOL/L (ref 100–108)
CO2 SERPL-SCNC: 28 MMOL/L (ref 21–32)
CREAT SERPL-MCNC: 0.68 MG/DL (ref 0.6–1.3)
EOSINOPHIL # BLD AUTO: 0.2 THOUSAND/ΜL (ref 0–0.61)
EOSINOPHIL NFR BLD AUTO: 3 % (ref 0–6)
ERYTHROCYTE [DISTWIDTH] IN BLOOD BY AUTOMATED COUNT: 13.5 % (ref 11.6–15.1)
GFR SERPL CREATININE-BSD FRML MDRD: 107 ML/MIN/1.73SQ M
GLUCOSE SERPL-MCNC: 85 MG/DL (ref 65–140)
HCT VFR BLD AUTO: 46.7 % (ref 34.8–46.1)
HGB BLD-MCNC: 15 G/DL (ref 11.5–15.4)
IMM GRANULOCYTES # BLD AUTO: 0.02 THOUSAND/UL (ref 0–0.2)
IMM GRANULOCYTES NFR BLD AUTO: 0 % (ref 0–2)
LYMPHOCYTES # BLD AUTO: 1.24 THOUSANDS/ΜL (ref 0.6–4.47)
LYMPHOCYTES NFR BLD AUTO: 19 % (ref 14–44)
MCH RBC QN AUTO: 30.9 PG (ref 26.8–34.3)
MCHC RBC AUTO-ENTMCNC: 32.1 G/DL (ref 31.4–37.4)
MCV RBC AUTO: 96 FL (ref 82–98)
MONOCYTES # BLD AUTO: 0.53 THOUSAND/ΜL (ref 0.17–1.22)
MONOCYTES NFR BLD AUTO: 8 % (ref 4–12)
NEUTROPHILS # BLD AUTO: 4.64 THOUSANDS/ΜL (ref 1.85–7.62)
NEUTS SEG NFR BLD AUTO: 69 % (ref 43–75)
NRBC BLD AUTO-RTO: 0 /100 WBCS
PLATELET # BLD AUTO: 233 THOUSANDS/UL (ref 149–390)
PMV BLD AUTO: 11.2 FL (ref 8.9–12.7)
POTASSIUM SERPL-SCNC: 4.1 MMOL/L (ref 3.5–5.3)
RBC # BLD AUTO: 4.85 MILLION/UL (ref 3.81–5.12)
SODIUM SERPL-SCNC: 142 MMOL/L (ref 136–145)
WBC # BLD AUTO: 6.68 THOUSAND/UL (ref 4.31–10.16)

## 2019-09-20 PROCEDURE — 85025 COMPLETE CBC W/AUTO DIFF WBC: CPT

## 2019-09-20 PROCEDURE — 36415 COLL VENOUS BLD VENIPUNCTURE: CPT

## 2019-09-20 PROCEDURE — 80048 BASIC METABOLIC PNL TOTAL CA: CPT

## 2019-09-20 PROCEDURE — 84702 CHORIONIC GONADOTROPIN TEST: CPT

## 2019-09-23 ENCOUNTER — DOCTOR'S OFFICE (OUTPATIENT)
Dept: URBAN - METROPOLITAN AREA CLINIC 136 | Facility: CLINIC | Age: 44
Setting detail: OPHTHALMOLOGY
End: 2019-09-23
Payer: COMMERCIAL

## 2019-09-23 DIAGNOSIS — H40.033: ICD-10-CM

## 2019-09-23 DIAGNOSIS — Z83.511: ICD-10-CM

## 2019-09-23 DIAGNOSIS — H43.813: ICD-10-CM

## 2019-09-23 DIAGNOSIS — H04.123: ICD-10-CM

## 2019-09-23 PROCEDURE — 92014 COMPRE OPH EXAM EST PT 1/>: CPT | Performed by: OPHTHALMOLOGY

## 2019-09-23 ASSESSMENT — REFRACTION_MANIFEST
OU_VA: 20/
OD_SPHERE: PLANO
OD_VA2: 20/
OS_ADD: +1.25
OD_VA3: 20/
OD_ADD: +1.25
OS_VA3: 20/
OS_VA1: 20/
OS_VA1: 20/20
OD_VA1: 20/20
OS_VA3: 20/
OS_VA2: 20/
OS_SPHERE: PLANO
OD_VA1: 20/
OD_VA2: 20/
OS_VA2: 20/
OD_VA3: 20/
OU_VA: 20/

## 2019-09-23 ASSESSMENT — REFRACTION_CURRENTRX
OD_OVR_VA: 20/
OS_OVR_VA: 20/
OD_OVR_VA: 20/
OD_SPHERE: PLANO
OS_OVR_VA: 20/
OD_OVR_VA: 20/
OS_VPRISM_DIRECTION: BF
OS_OVR_VA: 20/
OS_ADD: +1.25
OD_ADD: +1.25
OS_SPHERE: PLANO
OD_VPRISM_DIRECTION: BF

## 2019-09-23 ASSESSMENT — CONFRONTATIONAL VISUAL FIELD TEST (CVF)
OS_FINDINGS: FULL
OD_FINDINGS: FULL

## 2019-09-23 ASSESSMENT — REFRACTION_AUTOREFRACTION
OD_AXIS: 161
OD_SPHERE: +1.75
OD_CYLINDER: -1.00
OS_SPHERE: +1.00
OS_CYLINDER: -0.75
OS_AXIS: 077

## 2019-09-23 ASSESSMENT — SPHEQUIV_DERIVED
OS_SPHEQUIV: 0.625
OD_SPHEQUIV: 1.25

## 2019-09-23 ASSESSMENT — SUPERFICIAL PUNCTATE KERATITIS (SPK)
OS_SPK: 1+
OD_SPK: 1+

## 2019-09-23 ASSESSMENT — VISUAL ACUITY
OD_BCVA: 20/20
OS_BCVA: 20/20

## 2019-09-27 NOTE — PRE-PROCEDURE INSTRUCTIONS
Pre-Surgery Instructions:   Medication Instructions    butalbital-acetaminophen-caffeine (FIORICET,ESGIC) -40 mg per tablet Instructed patient per Anesthesia Guidelines  Pre-op Showering Instructions for Surgery Patients    Before your operation, you play an important role in decreasing your risk for infection by washing with special antiseptic soap  This is an effective way to reduce bacteria on the skin which may help to prevent infections at the surgical site  Please read the following directions in advance  1  In the week before your operation, purchase a 4 ounce bottle of antiseptic soap containing chlorhexidine gluconate (CHG)  4%  Some brand names include: Aplicare®, Endure, and Hibiclens®  The cost is usually less than $5 00   For your convenience, the [employer] carries the soap   It may also be available at your doctors office or pre-admission testing center, and at most retail pharmacies   If you are allergic or sensitive to soaps containing CHG, please let your doctor know so another antiseptic can be suggested   CHG antiseptic soap is for external use only  2  The day before your operation, follow these instructions carefully to get ready   Please clean linens (sheets) on your bed; you should sleep on clean sheets after your evening shower   Get clean towels and washcloth ready - you need enough for 2 showers   Set aside clean underwear, pajamas, and clothing to wear after the showers     Reminders:   DO NOT use any other soap or body rinse on your skin during or after the antiseptic showers   DO NOT use lotion, powder, deodorant, or perfume/aftershave of any kind on your skin after your antiseptic shower   DO NOT shave any body parts in the 24 hours/day before your operation   DO NOT get the antiseptic soap in your eyes, ears, nose, mouth, or vaginal area    3   You will need to shower the night before AND the morning of your surgery  Shower 1:   The first evening before the operation, take the first shower   First, shampoo your hair with regular shampoo and rinse it completely before you use the antiseptic soap  After washing and rinsing your hair, rinse your body   Next, use a clean washcloth to apply the antiseptic soap and wash your body from the neck down to your toes using ½ bottle of the antiseptic soap   You will use the other ½ bottle for the second shower   Clean the area where your incision will be; lather this area well for about 2 minutes   If you are having head or neck surgery, wash areas with the antiseptic soap   Rinse yourself completely with running water   Use a clean towel to dry off   Wear clean underwear and clothing/pajamas  Shower 2   The morning of your operation, take the second shower following the same steps as Shower 1 using the second ½ of the bottle of antiseptic soap   Use clean cloths and towels to wash and dry yourself   Wear clean underwear and clothing

## 2019-10-01 ENCOUNTER — ANESTHESIA EVENT (OUTPATIENT)
Dept: PERIOP | Facility: HOSPITAL | Age: 44
End: 2019-10-01
Payer: COMMERCIAL

## 2019-10-01 ENCOUNTER — OFFICE VISIT (OUTPATIENT)
Dept: GYNECOLOGY | Facility: CLINIC | Age: 44
End: 2019-10-01

## 2019-10-01 VITALS
SYSTOLIC BLOOD PRESSURE: 106 MMHG | WEIGHT: 148.4 LBS | BODY MASS INDEX: 23.29 KG/M2 | HEIGHT: 67 IN | HEART RATE: 72 BPM | DIASTOLIC BLOOD PRESSURE: 70 MMHG

## 2019-10-01 DIAGNOSIS — Z48.89 POSTOPERATIVE VISIT: Primary | ICD-10-CM

## 2019-10-01 PROCEDURE — 99024 POSTOP FOLLOW-UP VISIT: CPT | Performed by: OBSTETRICS & GYNECOLOGY

## 2019-10-01 NOTE — PROGRESS NOTES
Patient presents to the office today for postoperative check she is status post [facility] BS and excision of peritoneal endometriosis  She is doing well since the surgery with no complaints  Her incisions are healing well  There is no exudate or erythema or evidence of hematomas  Pathology report revealed adenomyosis leiomyomata and endometriosis      Impression:  Postoperative check    Plan:  Return to the office p r n /annual

## 2019-10-01 NOTE — ANESTHESIA PREPROCEDURE EVALUATION
Review of Systems/Medical History  Patient summary reviewed  Chart reviewed  History of anesthetic complications PONV    Cardiovascular  Hypertension ,    Pulmonary    Comment: Bulla of lung     GI/Hepatic  Negative GI/hepatic ROS   Bariatric surgery,        Negative  ROS        Endo/Other  Negative endo/other ROS      GYN    Hysterectomy,        Hematology  Anemia iron deficiency anemia,     Musculoskeletal  Negative musculoskeletal ROS        Neurology    Headaches (AVM - clipped at age 15),   Comment: Narrow angle glaucoma repaired with laser  AVM - Crani cauterized age 15 Psychology           Physical Exam    Airway    Mallampati score: I  TM Distance: >3 FB  Neck ROM: full     Dental       Cardiovascular  Cardiovascular exam normal    Pulmonary  Pulmonary exam normal     Other Findings        Anesthesia Plan  ASA Score- 2     Anesthesia Type- IV sedation with anesthesia with ASA Monitors  Additional Monitors:   Airway Plan:         Plan Factors-    Induction-     Postoperative Plan-     Informed Consent- Anesthetic plan and risks discussed with patient  I personally reviewed this patient with the CRNA  Discussed and agreed on the Anesthesia Plan with the CRNA  Sixto Rain

## 2019-10-02 ENCOUNTER — ANESTHESIA (OUTPATIENT)
Dept: PERIOP | Facility: HOSPITAL | Age: 44
End: 2019-10-02
Payer: COMMERCIAL

## 2019-10-02 ENCOUNTER — APPOINTMENT (OUTPATIENT)
Dept: RADIOLOGY | Facility: HOSPITAL | Age: 44
End: 2019-10-02
Payer: COMMERCIAL

## 2019-10-02 ENCOUNTER — HOSPITAL ENCOUNTER (OUTPATIENT)
Facility: HOSPITAL | Age: 44
Setting detail: OUTPATIENT SURGERY
Discharge: HOME | End: 2019-10-02
Attending: PODIATRIST | Admitting: PODIATRIST
Payer: COMMERCIAL

## 2019-10-02 VITALS
BODY MASS INDEX: 23.23 KG/M2 | DIASTOLIC BLOOD PRESSURE: 66 MMHG | OXYGEN SATURATION: 100 % | TEMPERATURE: 97 F | HEART RATE: 52 BPM | RESPIRATION RATE: 16 BRPM | WEIGHT: 148 LBS | HEIGHT: 67 IN | SYSTOLIC BLOOD PRESSURE: 108 MMHG

## 2019-10-02 PROCEDURE — 73630 X-RAY EXAM OF FOOT: CPT

## 2019-10-02 RX ORDER — DIPHENHYDRAMINE HYDROCHLORIDE 50 MG/ML
12.5 INJECTION INTRAMUSCULAR; INTRAVENOUS ONCE AS NEEDED
Status: DISCONTINUED | OUTPATIENT
Start: 2019-10-02 | End: 2019-10-02 | Stop reason: HOSPADM

## 2019-10-02 RX ORDER — LIDOCAINE HYDROCHLORIDE 10 MG/ML
INJECTION, SOLUTION INFILTRATION; PERINEURAL AS NEEDED
Status: DISCONTINUED | OUTPATIENT
Start: 2019-10-02 | End: 2019-10-02 | Stop reason: SURG

## 2019-10-02 RX ORDER — CEFAZOLIN SODIUM 2 G/50ML
2000 SOLUTION INTRAVENOUS ONCE
Status: COMPLETED | OUTPATIENT
Start: 2019-10-02 | End: 2019-10-02

## 2019-10-02 RX ORDER — OXYCODONE HYDROCHLORIDE AND ACETAMINOPHEN 5; 325 MG/1; MG/1
1 TABLET ORAL EVERY 4 HOURS PRN
Status: DISCONTINUED | OUTPATIENT
Start: 2019-10-02 | End: 2019-10-02 | Stop reason: HOSPADM

## 2019-10-02 RX ORDER — SODIUM CHLORIDE, SODIUM LACTATE, POTASSIUM CHLORIDE, CALCIUM CHLORIDE 600; 310; 30; 20 MG/100ML; MG/100ML; MG/100ML; MG/100ML
75 INJECTION, SOLUTION INTRAVENOUS CONTINUOUS
Status: DISCONTINUED | OUTPATIENT
Start: 2019-10-02 | End: 2019-10-02 | Stop reason: HOSPADM

## 2019-10-02 RX ORDER — MIDAZOLAM HYDROCHLORIDE 1 MG/ML
INJECTION INTRAMUSCULAR; INTRAVENOUS AS NEEDED
Status: DISCONTINUED | OUTPATIENT
Start: 2019-10-02 | End: 2019-10-02 | Stop reason: SURG

## 2019-10-02 RX ORDER — FENTANYL CITRATE 50 UG/ML
INJECTION, SOLUTION INTRAMUSCULAR; INTRAVENOUS AS NEEDED
Status: DISCONTINUED | OUTPATIENT
Start: 2019-10-02 | End: 2019-10-02 | Stop reason: SURG

## 2019-10-02 RX ORDER — FENTANYL CITRATE/PF 50 MCG/ML
25 SYRINGE (ML) INJECTION
Status: DISCONTINUED | OUTPATIENT
Start: 2019-10-02 | End: 2019-10-02 | Stop reason: HOSPADM

## 2019-10-02 RX ORDER — HYDROMORPHONE HCL/PF 1 MG/ML
0.5 SYRINGE (ML) INJECTION
Status: DISCONTINUED | OUTPATIENT
Start: 2019-10-02 | End: 2019-10-02 | Stop reason: HOSPADM

## 2019-10-02 RX ORDER — MAGNESIUM HYDROXIDE 1200 MG/15ML
LIQUID ORAL AS NEEDED
Status: DISCONTINUED | OUTPATIENT
Start: 2019-10-02 | End: 2019-10-02 | Stop reason: HOSPADM

## 2019-10-02 RX ORDER — ONDANSETRON 2 MG/ML
INJECTION INTRAMUSCULAR; INTRAVENOUS AS NEEDED
Status: DISCONTINUED | OUTPATIENT
Start: 2019-10-02 | End: 2019-10-02 | Stop reason: SURG

## 2019-10-02 RX ORDER — OXYCODONE HYDROCHLORIDE AND ACETAMINOPHEN 5; 325 MG/1; MG/1
2 TABLET ORAL EVERY 4 HOURS PRN
Status: DISCONTINUED | OUTPATIENT
Start: 2019-10-02 | End: 2019-10-02 | Stop reason: HOSPADM

## 2019-10-02 RX ORDER — PROPOFOL 10 MG/ML
INJECTION, EMULSION INTRAVENOUS AS NEEDED
Status: DISCONTINUED | OUTPATIENT
Start: 2019-10-02 | End: 2019-10-02 | Stop reason: SURG

## 2019-10-02 RX ORDER — DEXAMETHASONE SODIUM PHOSPHATE 4 MG/ML
INJECTION, SOLUTION INTRA-ARTICULAR; INTRALESIONAL; INTRAMUSCULAR; INTRAVENOUS; SOFT TISSUE AS NEEDED
Status: DISCONTINUED | OUTPATIENT
Start: 2019-10-02 | End: 2019-10-02 | Stop reason: SURG

## 2019-10-02 RX ORDER — PROPOFOL 10 MG/ML
INJECTION, EMULSION INTRAVENOUS CONTINUOUS PRN
Status: DISCONTINUED | OUTPATIENT
Start: 2019-10-02 | End: 2019-10-02 | Stop reason: SURG

## 2019-10-02 RX ADMIN — DEXAMETHASONE SODIUM PHOSPHATE 4 MG: 4 INJECTION, SOLUTION INTRAMUSCULAR; INTRAVENOUS at 08:01

## 2019-10-02 RX ADMIN — SODIUM CHLORIDE, SODIUM LACTATE, POTASSIUM CHLORIDE, AND CALCIUM CHLORIDE: .6; .31; .03; .02 INJECTION, SOLUTION INTRAVENOUS at 07:40

## 2019-10-02 RX ADMIN — MIDAZOLAM HYDROCHLORIDE 2 MG: 1 INJECTION, SOLUTION INTRAMUSCULAR; INTRAVENOUS at 07:45

## 2019-10-02 RX ADMIN — LIDOCAINE HYDROCHLORIDE 30 MG: 10 INJECTION, SOLUTION INFILTRATION; PERINEURAL at 07:50

## 2019-10-02 RX ADMIN — ONDANSETRON 4 MG: 2 INJECTION INTRAMUSCULAR; INTRAVENOUS at 08:25

## 2019-10-02 RX ADMIN — FENTANYL CITRATE 25 MCG: 50 INJECTION INTRAMUSCULAR; INTRAVENOUS at 08:07

## 2019-10-02 RX ADMIN — FENTANYL CITRATE 50 MCG: 50 INJECTION INTRAMUSCULAR; INTRAVENOUS at 07:50

## 2019-10-02 RX ADMIN — PROPOFOL 100 MCG/KG/MIN: 10 INJECTION, EMULSION INTRAVENOUS at 07:52

## 2019-10-02 RX ADMIN — PROPOFOL 50 MG: 10 INJECTION, EMULSION INTRAVENOUS at 07:51

## 2019-10-02 RX ADMIN — CEFAZOLIN SODIUM 2000 MG: 2 SOLUTION INTRAVENOUS at 07:48

## 2019-10-02 RX ADMIN — FENTANYL CITRATE 25 MCG: 50 INJECTION INTRAMUSCULAR; INTRAVENOUS at 08:14

## 2019-10-02 NOTE — OP NOTE
OPERATIVE REPORT  PATIENT NAME: Zack Coker    :  1975  MRN: [id number]  Pt Location:  OR ROOM 12    SURGERY DATE: 10/2/2019    Surgeon(s) and Role:     * Antony Zacarias DPM - Primary     * Norma Rucker DPM - Assisting    Preop Diagnosis:  Other enthesopathy of right foot [M77 51]    Post-Op Diagnosis Codes:     * Other enthesopathy of right foot [M77 51]    Procedure(s) (LRB):  EXOSTECTOMY RIGHT FOOT (Right)    Specimen(s):  * No specimens in log *    Estimated Blood Loss:   Minimal    Drains:  * No LDAs found *    Anesthesia Type:   IV Sedation with Anesthesia    Injectables: One-to-one mixture 1% lidocaine plain and 0 5% Marcaine plain    Hemostasis:  Right pneumatic ankle tourniquet at 250 mmHg for 22 minutes    Materials:  3 0 Vicryl sutures, 4 nylon sutures    Operative Indications: Other enthesopathy of right foot [M77 51]    Operative Findings:COnsistent with the diagnosis  Complications:   None    Procedure and Technique:    Under mild sedation, the patient was brought into the operating room and placed on the operating room table in the supine position  A pneumatic ankle tourniquet was then placed around the patient's right ankle with ample webril padding  A time out was performed to confirm the correct patient, procedure and site with all parties in agreement  Following IV sedation, local anesthetic was obtained about the patient's medial midfoot was performed consisting of 10 ml of 1% Lidocaine and 0 5% Bupivacaine in a 1:1 mixture  The foot was then scrubbed, prepped and draped in the usual aseptic manner  An esmarch bandage was utilized to exsangunate the patients foot and the pneumatic ankle tourniquet was then inflated  The esmarch bandage was removed and the foot was placed on the operating room table  Attention was then directed to the dorsal aspect of the right foot where the exostosis was prominent at the level of 1st tarsometatarsal joint  Utilizing a 15   Blade and S shape incision was made over the TMT joint  The incision was deepened through the subcutaneous tissues using sharp and blunt dissection  Care was taken to identify and retract all the vital neural vascular structures  All bleeders were cauterized as necessary  Extensor hallucis longus tendon was then identified and retracted laterally  The periosteal and capsular structures were then carefully dissected free of their osseous attachments and reflected medially and laterally, this exposing the exostosis at the level of tarsometatarsal joint  Utilizing a rongeur the bony prominence was removed  Power marcel was utilized to smoothed bony prominence  The incision site was then rinsed with copious amount of normal saline  Incision site was further inspected and no exostosis was noted at this time  The periosteal and capsular structures were then reapproximated utilizing a 3 0 Vicryl sutures  The subcutaneous tissue were then reapproximated utilizing the 0 Vicryl sutures  The skin was then reapproximated utilizing a 4 0 nylon suture in horizontal mattress fashion  Incision site was dressed with Xeroform, 4 x 4, Macy and a Coban  The pneumatic ankle tourniquet was then deflated and prompt hyperemic response was noted to the all digits of the right foot  Patient tolerated the procedure well and was transferred to PACU with stable vital signs      Dr Adrian Smyth was present for the entire procedure and participated in all key aspects    Patient Disposition:  PACU  and hemodynamically stable    SIGNATURE: Ras Durand DPM  DATE: October 2, 2019  TIME: 8:39 AM

## 2019-10-02 NOTE — NURSING NOTE
Returned from PACU at [address]  Alert and oriented  Denies pain  Right foot dressing and ACE wrap dry and intact  Toes warm to touch  Positive sensation and movement of toes  Foot elevated  Ice to foot  Respirations easy and non labored  IV fluids continue  Call bell in reach

## 2019-10-02 NOTE — ANESTHESIA POSTPROCEDURE EVALUATION
Post-Op Assessment Note    CV Status:  Stable    Pain management: adequate     Mental Status:  Awake   Hydration Status:  Euvolemic   PONV Controlled:  None   Post Op Vitals Reviewed: Yes      Staff: Anesthesiologist           /69 (10/02/19 0835)    Temp 97 8 °F (36 6 °C) (10/02/19 0835)    Pulse 65 (10/02/19 0835)   Resp 12 (10/02/19 0835)    SpO2 96 % (10/02/19 0835)

## 2019-10-02 NOTE — DISCHARGE INSTRUCTIONS
Dr Yovanny Adrian Instructions    1  Take your prescribed medication as directed  2  Upon arrival at home, lie down and elevate your surgical foot on 2 pillows  3  Remain quiet, off your feet as much as possible, for the first 24-48 hours  This is when your feet first swell and may become painful  After 48 hours you may begin limited walking following these restrictions:   Weightbear as tolerated to surgical foot use crutches if needed  4  Drink large quantities of water  Consume no alcohol  Continue a well-balanced diet  5  Report any unusual discomfort or fever to this office  6  A limited amount of discomfort and swelling is to be expected  In some cases the skin may take on a bruised appearance  The surgical solution that was applied to your foot prior to the operation is dark in color and the operation site may appear to be oozing when it actually is not  7  A slight amount of blood is to be expected, and is no cause for alarm  Do not remove the dressings  If there is active bleeding and if the bleeding persists, add additional gauze to the bandage, apply direct pressure, elevate your feet and call this office  8  Do not get the dressings wet  As regular bathing may be inconvenient, sponge baths are recommended  9  When anesthesia wears off and if any discomfort should be present, apply an ice pack directly over the operated area for 15 minute intervals for several hours or until the pain leaves  (USE IN EXCESS OF 15 MINUTES COULD CAUSE FROSTBITE)  Do not use hot water bags or electric pads  A convenient icepack can be made by placing ice cubes in a plastic bag and covering this with a towel  10  If necessary, take a mild laxative before retiring  11  Wear your special open shoes anytime you put weight on your foot, even if it is just to walk to the bathroom and back  It will probably be 2 or 3 weeks before you will be permitted to try regular shoes    12  Having performed the operation, we are interested in a prompt recovery  Please cooperate by following the above instructions  13  Please call to confirm your post-op appointment or call with any other questions

## 2019-10-02 NOTE — NURSING NOTE
Patient without complaints  No noted drainage  Toes warm/good capillary refill noted  No noted distress

## 2019-10-02 NOTE — DISCHARGE SUMMARY
Discharge Summary Outpatient Procedure Podiatry -   Yeny Yusuf 40 y o  female MRN: [id number]  Unit/Bed#: OR POOL Encounter: [id number]    Admission Date: 10/2/2019     Admitting Diagnosis: Other enthesopathy of right foot [M77 51]    Discharge Diagnosis: same    Procedures Performed: EXOSTECTOMY RIGHT FOOT:     Complications: none    Condition at Discharge: stable    Discharge instructions/Information to patient and family:   See after visit summary for information provided to patient and family  Provisions for Follow-Up Care/Important appointments:  See after visit summary for information related to follow-up care and any pertinent home health orders  Discharge Medications:  See after visit summary for reconciled discharge medications provided to patient and family

## 2019-10-02 NOTE — INTERVAL H&P NOTE
H&P reviewed  After examining the patient I find no changes in the patients condition since the H&P had been written      Vitals:    10/02/19 0610   BP: 118/74   Pulse: 68   Resp: 20   Temp: (!) 97 3 °F (36 3 °C)   SpO2: 95%

## 2019-11-07 ENCOUNTER — CONSULT (OUTPATIENT)
Dept: SURGERY | Facility: CLINIC | Age: 44
End: 2019-11-07
Payer: COMMERCIAL

## 2019-11-07 VITALS
WEIGHT: 148 LBS | HEIGHT: 67 IN | DIASTOLIC BLOOD PRESSURE: 60 MMHG | HEART RATE: 60 BPM | SYSTOLIC BLOOD PRESSURE: 100 MMHG | TEMPERATURE: 98.5 F | BODY MASS INDEX: 23.23 KG/M2

## 2019-11-07 DIAGNOSIS — R10.31 RIGHT LOWER QUADRANT ABDOMINAL PAIN: Primary | ICD-10-CM

## 2019-11-07 PROCEDURE — 99243 OFF/OP CNSLTJ NEW/EST LOW 30: CPT | Performed by: SURGERY

## 2019-11-07 NOTE — PROGRESS NOTES
Assessment/Plan:   Edd Guillaume is a 40 y  o female who is here for There were no encounter diagnoses  Plan:  The patient was told by somebody around the time surgery she might have a hernia  No hernias felt on physical exam and her clinical symptoms do not correlate with a hernia  I thoroughly read the operative report and in the findings and the body of the report there is no mention of a hernia whatsoever  Therefore there is no indication for surgery at this time and will see her back on an as-needed basis  She is very happy with this conclusion  She has had multiple previous surgeries which are risk factor for hernia  She is to return to this office immediately if she has any signs or symptoms of hernia and we reviewed this with her       ______________________________________________________  CC:Hernia (Possible hernia )    HPI:  Edd Guillaume is a 40 y  o female who was referred for evaluation of Hernia (Possible hernia )    Currently patient had it hysterectomy by Dr Ana Hoffman in September 2019  She was told she had a hernia fixed the  She is not sure how this was discussed but she did have some tenderness around her Pfannenstiel incision and wanted us to examine her to see if there was any hernia  She denies nausea, vomiting, fevers, chills  Moving her bowels daily  Tenderness at the incision site          ROS:  General ROS: negative  negative for - chills, fatigue, fever or night sweats, weight loss  Respiratory ROS: no cough, shortness of breath, or wheezing  Cardiovascular ROS: no chest pain or dyspnea on exertion  Genito-Urinary ROS: no dysuria, trouble voiding, or hematuria  Musculoskeletal ROS: negative for - gait disturbance, joint pain or muscle pain  Neurological ROS: no TIA or stroke symptoms  Gastrointestinal: see HPI  No abdominal pain, melena, BRB unless specified in HPI  Skin ROS: no new rashes or lesions   Lymphatic ROS: no new adenopathy noted by pt     GYN ROS: see HPI, no new GYN history or bleeding noted  Psy ROS: no new mental or behavioral disturbances       Patient Active Problem List   Diagnosis    Bariatric surgery status    Postsurgical malabsorption    Abnormal CT of the chest    Anemia, iron deficiency    Acute right-sided low back pain with right-sided sciatica    Bulla, lung (HCC)    Coccydynia    Neoplasm of skin    Adenomyosis    Dysmenorrhea    Preop examination    Exostosis of right foot    Status post laparoscopic supracervical hysterectomy         Allergies:  Pollen extract      Current Outpatient Medications:     acetaminophen (TYLENOL) 500 mg tablet, Take 500 mg by mouth every 6 (six) hours as needed for mild pain, Disp: , Rfl:     Ascorbic Acid (CVS VITAMIN C) 500 MG CHEW, Chew 1 tablet 2 (two) times a day, Disp: , Rfl:     butalbital-acetaminophen-caffeine (FIORICET,ESGIC) -40 mg per tablet, Take 1 tablet by mouth every 4 (four) hours as needed for headaches, Disp: 30 tablet, Rfl: 0    calcium carbonate-vitamin D (OSCAL-D) 500 mg-200 units per tablet, Take 1 tablet by mouth 2 (two) times a day with meals, Disp: , Rfl:     cyanocobalamin (VITAMIN B-12) 500 mcg tablet, Take 500 mcg by mouth daily, Disp: , Rfl:     ferrous sulfate (CVS IRON) 325 (65 Fe) mg tablet, Take 325 mg by mouth 2 (two) times a day, Disp: , Rfl:     multivitamin (THERAGRAN) TABS, Take 1 tablet by mouth 2 (two) times a day, Disp: , Rfl:     Past Medical History:   Diagnosis Date    Anemia     iron def    Bulla of lung ([address] )     Coccydynia     Family history of vitamin B12 deficiency     Fibroid, uterine     Hematuria     last assessed: 12/11/2013    History of hypertension     History of morbid obesity     History of vitamin B deficiency     History of vitamin D deficiency     Hypertension     Hypocalcemia     Hypocalcemia     Iron deficiency     Iron deficiency     Kyphosis deformity of spine     Menopause     Migraine     Morbid (severe) obesity due to excess calories ([address] )     per [employer]-last assessed: 4/11/2014    Narrow angle glaucoma suspect of both eyes     PONV (postoperative nausea and vomiting)     nausea only    Postgastrectomy malabsorption     Vitamin A deficiency     last assessed: 11/11/2014       Past Surgical History:   Procedure Laterality Date    APPENDECTOMY      AUGMENTATION MAMMAPLASTY      BRAIN SURGERY  1990    AVM    BREAST BIOPSY      BREAST LUMPECTOMY Right 1992    BUNIONECTOMY Right 10/2/2019    Procedure: EXOSTECTOMY RIGHT FOOT;  Surgeon: Annamarie Mooney DPM;  Location: [facility] MAIN OR;  Service: Podiatry   [facility] [place] [facility]      tummy tuck,breast implants and lift    ENDOMETRIAL ABLATION  08/2018    PA LAP, SUPRACERVIAL HYSTERECTOMY W/ TUBE&OV, <250G N/A 9/16/2019    Procedure: HYSTERECTOMY LAPAROSCOPIC SUPRACERVICAL ([facility])  WITH B/L SALPINGECTOMY   EXCISION OF ENDOMETRIAL CYST;  Surgeon: Abdirashid Evans DO;  Location: [facility];  Service: Gynecology    REFRACTIVE SURGERY      glaucoma    HITESH-EN-Y PROCEDURE  2014    TONSILLECTOMY AND ADENOIDECTOMY      including adenoids    TOOTH EXTRACTION      TUBAL LIGATION      US GUIDANCE BREAST BIOPSY LEFT EACH ADDITIONAL Left 10/12/2018    US GUIDED BREAST BIOPSY LEFT COMPLETE Left 10/12/2018       Family History   Problem Relation Age of Onset    Hypertension Mother     Atrial fibrillation Mother     Melanoma Mother     Cancer Mother         on left upper chest    Graves' disease Mother     Hypertension Father     Atrial fibrillation Father     Diabetes Father     Obesity Father     Allergies Father     Heart disease Father     Stomach cancer Maternal Aunt     Other Maternal Aunt         GIST, malignant-per Allscripts    Stomach cancer Paternal Aunt     Cancer Maternal Grandfather         Lung    Stroke Paternal Grandmother     Cancer Paternal Grandfather         Lung    Depression Neg Hx     Anxiety disorder Neg Hx     Drug abuse Neg Hx         reports that she has never smoked  She has never used smokeless tobacco  She reports that she drinks about 1 0 standard drinks of alcohol per week  She reports that she does not use drugs  Labs:   Lab Results   Component Value Date    WBC 6 68 09/20/2019    HGB 15 0 09/20/2019    HCT 46 7 (H) 09/20/2019    MCV 96 09/20/2019     09/20/2019     Lab Results   Component Value Date     09/10/2014    K 4 1 09/20/2019     (H) 09/20/2019    CO2 28 09/20/2019    ANIONGAP 5 09/10/2014    BUN 13 09/20/2019    CREATININE 0 68 09/20/2019    GLUCOSE 88 09/10/2014    GLUF 85 05/26/2019    CALCIUM 9 7 09/20/2019    AST 14 05/26/2019    ALT 16 05/26/2019    ALKPHOS 77 05/26/2019    PROT 6 6 09/10/2014    BILITOT 1 1 (H) 09/10/2014    EGFR 107 09/20/2019         Imaging: No new pertinent imaging studies  PHYSICAL EXAM    Vitals:    11/07/19 1310   BP: 100/60   Pulse: 60   Temp: 98 5 °F (36 9 °C)          PHYSICAL EXAM  General Appearance:    Alert, cooperative, no distress, thin   Head:    Normocephalic without obvious abnormality   Eyes:    PERRL, conjunctiva/corneas clear, EOM's intact        Neck:   Supple, no adenopathy, no JVD   Back:     Symmetric, no spinal or CVA tenderness   Lungs:     Clear to auscultation bilaterally, no wheezing or rhonchi   Heart:    Regular rate and rhythm, S1 and S2 normal, no murmur   Abdomen:     Is soft, flat, nontender, incisions are very well-healed  She has a Pfannenstiel incision from hip to hip and a periumbilical incision   Extremities:   Extremities normal  No clubbing, cyanosis or edema   Psych:   Normal Affect   Neurologic:   CNII-XII intact  Strength symmetric, speech intact                                           Some portions of this record may have been generated with voice recognition software  There may be translation, syntax,  or grammatical errors   Occasional wrong word or "sound-a-like" substitutions may have occurred due to the inherent limitations of the voice recognition software  Read the chart carefully and recognize, using context, where substitutions may have occurred  If you have any questions, please contact the dictating provider for clarification or correction, as needed  This encounter has been coded by a non-certified coder         Selina Garsia MD    Date: 11/7/2019 Time: 1:39 PM

## 2019-11-15 ENCOUNTER — TELEPHONE (OUTPATIENT)
Dept: GYNECOLOGY | Facility: CLINIC | Age: 44
End: 2019-11-15

## 2019-11-15 NOTE — TELEPHONE ENCOUNTER
----- Message from Kyle Salas, [address] sent at 4/16/2019  7:30 AM EDT -----  Due for a diagnostic B/L mammo and LEFT breast ultrasound 10/15/19 as follow up

## 2019-11-15 NOTE — TELEPHONE ENCOUNTER
Spoke with patient about need for B/L diagnostic mammogram and left breast ultrasound  She stated she was not able to get it yet because her job went on strike, but she is back at work now and will schedule  Order is in the computer, phone number given to central scheduling

## 2019-12-03 ENCOUNTER — TRANSCRIBE ORDERS (OUTPATIENT)
Dept: MAMMOGRAPHY | Facility: CLINIC | Age: 44
End: 2019-12-03

## 2019-12-03 ENCOUNTER — HOSPITAL ENCOUNTER (OUTPATIENT)
Dept: MAMMOGRAPHY | Facility: CLINIC | Age: 44
Discharge: HOME | End: 2019-12-03
Payer: COMMERCIAL

## 2019-12-03 ENCOUNTER — HOSPITAL ENCOUNTER (OUTPATIENT)
Dept: ULTRASOUND IMAGING | Facility: CLINIC | Age: 44
Discharge: HOME | End: 2019-12-03
Payer: COMMERCIAL

## 2019-12-03 VITALS — BODY MASS INDEX: 23.23 KG/M2 | HEIGHT: 67 IN | WEIGHT: 148 LBS

## 2019-12-03 DIAGNOSIS — Z12.31 VISIT FOR SCREENING MAMMOGRAM: Primary | ICD-10-CM

## 2019-12-03 DIAGNOSIS — N63.0 BREAST MASS: ICD-10-CM

## 2019-12-03 DIAGNOSIS — R92.8 ABNORMAL FINDINGS ON DIAGNOSTIC IMAGING OF BREAST: ICD-10-CM

## 2019-12-03 PROCEDURE — 77066 DX MAMMO INCL CAD BI: CPT

## 2019-12-03 PROCEDURE — G0279 TOMOSYNTHESIS, MAMMO: HCPCS

## 2019-12-03 PROCEDURE — 76642 ULTRASOUND BREAST LIMITED: CPT

## 2019-12-05 ENCOUNTER — APPOINTMENT (EMERGENCY)
Dept: RADIOLOGY | Facility: HOSPITAL | Age: 44
End: 2019-12-05
Payer: COMMERCIAL

## 2019-12-05 ENCOUNTER — TELEPHONE (OUTPATIENT)
Dept: GYNECOLOGY | Facility: CLINIC | Age: 44
End: 2019-12-05

## 2019-12-05 ENCOUNTER — HOSPITAL ENCOUNTER (EMERGENCY)
Facility: HOSPITAL | Age: 44
Discharge: HOME | End: 2019-12-05
Attending: EMERGENCY MEDICINE
Payer: COMMERCIAL

## 2019-12-05 VITALS
DIASTOLIC BLOOD PRESSURE: 67 MMHG | HEART RATE: 62 BPM | SYSTOLIC BLOOD PRESSURE: 109 MMHG | BODY MASS INDEX: 23.79 KG/M2 | TEMPERATURE: 98.3 F | WEIGHT: 151.9 LBS | OXYGEN SATURATION: 98 % | RESPIRATION RATE: 16 BRPM

## 2019-12-05 DIAGNOSIS — Z51.89 VISIT FOR WOUND CHECK: Primary | ICD-10-CM

## 2019-12-05 LAB
ALBUMIN SERPL BCP-MCNC: 3.4 G/DL (ref 3.5–5)
ALP SERPL-CCNC: 68 U/L (ref 46–116)
ALT SERPL W P-5'-P-CCNC: 13 U/L (ref 12–78)
ANION GAP SERPL CALCULATED.3IONS-SCNC: 6 MMOL/L (ref 4–13)
AST SERPL W P-5'-P-CCNC: 15 U/L (ref 5–45)
BASOPHILS # BLD AUTO: 0.06 THOUSANDS/ΜL (ref 0–0.1)
BASOPHILS NFR BLD AUTO: 1 % (ref 0–1)
BILIRUB SERPL-MCNC: 1.2 MG/DL (ref 0.2–1)
BUN SERPL-MCNC: 11 MG/DL (ref 5–25)
CALCIUM SERPL-MCNC: 8.5 MG/DL (ref 8.3–10.1)
CHLORIDE SERPL-SCNC: 105 MMOL/L (ref 100–108)
CO2 SERPL-SCNC: 28 MMOL/L (ref 21–32)
CREAT SERPL-MCNC: 0.66 MG/DL (ref 0.6–1.3)
EOSINOPHIL # BLD AUTO: 0.11 THOUSAND/ΜL (ref 0–0.61)
EOSINOPHIL NFR BLD AUTO: 1 % (ref 0–6)
ERYTHROCYTE [DISTWIDTH] IN BLOOD BY AUTOMATED COUNT: 12.4 % (ref 11.6–15.1)
GFR SERPL CREATININE-BSD FRML MDRD: 108 ML/MIN/1.73SQ M
GLUCOSE SERPL-MCNC: 88 MG/DL (ref 65–140)
HCT VFR BLD AUTO: 42 % (ref 34.8–46.1)
HGB BLD-MCNC: 13.7 G/DL (ref 11.5–15.4)
IMM GRANULOCYTES # BLD AUTO: 0.05 THOUSAND/UL (ref 0–0.2)
IMM GRANULOCYTES NFR BLD AUTO: 1 % (ref 0–2)
LYMPHOCYTES # BLD AUTO: 1.16 THOUSANDS/ΜL (ref 0.6–4.47)
LYMPHOCYTES NFR BLD AUTO: 12 % (ref 14–44)
MCH RBC QN AUTO: 31.1 PG (ref 26.8–34.3)
MCHC RBC AUTO-ENTMCNC: 32.6 G/DL (ref 31.4–37.4)
MCV RBC AUTO: 96 FL (ref 82–98)
MONOCYTES # BLD AUTO: 0.77 THOUSAND/ΜL (ref 0.17–1.22)
MONOCYTES NFR BLD AUTO: 8 % (ref 4–12)
NEUTROPHILS # BLD AUTO: 7.42 THOUSANDS/ΜL (ref 1.85–7.62)
NEUTS SEG NFR BLD AUTO: 77 % (ref 43–75)
NRBC BLD AUTO-RTO: 0 /100 WBCS
PLATELET # BLD AUTO: 198 THOUSANDS/UL (ref 149–390)
PMV BLD AUTO: 9.8 FL (ref 8.9–12.7)
POTASSIUM SERPL-SCNC: 4.1 MMOL/L (ref 3.5–5.3)
PROT SERPL-MCNC: 6.3 G/DL (ref 6.4–8.2)
RBC # BLD AUTO: 4.4 MILLION/UL (ref 3.81–5.12)
SODIUM SERPL-SCNC: 139 MMOL/L (ref 136–145)
WBC # BLD AUTO: 9.57 THOUSAND/UL (ref 4.31–10.16)

## 2019-12-05 PROCEDURE — 99285 EMERGENCY DEPT VISIT HI MDM: CPT | Performed by: EMERGENCY MEDICINE

## 2019-12-05 PROCEDURE — 80053 COMPREHEN METABOLIC PANEL: CPT | Performed by: EMERGENCY MEDICINE

## 2019-12-05 PROCEDURE — 73630 X-RAY EXAM OF FOOT: CPT

## 2019-12-05 PROCEDURE — 99284 EMERGENCY DEPT VISIT MOD MDM: CPT

## 2019-12-05 PROCEDURE — 85025 COMPLETE CBC W/AUTO DIFF WBC: CPT | Performed by: EMERGENCY MEDICINE

## 2019-12-05 PROCEDURE — 36415 COLL VENOUS BLD VENIPUNCTURE: CPT | Performed by: EMERGENCY MEDICINE

## 2019-12-05 RX ORDER — ACETAMINOPHEN 325 MG/1
650 TABLET ORAL ONCE
Status: COMPLETED | OUTPATIENT
Start: 2019-12-05 | End: 2019-12-05

## 2019-12-05 RX ORDER — IBUPROFEN 600 MG/1
600 TABLET ORAL ONCE
Status: DISCONTINUED | OUTPATIENT
Start: 2019-12-05 | End: 2019-12-05

## 2019-12-05 RX ORDER — CEPHALEXIN 500 MG/1
500 CAPSULE ORAL EVERY 6 HOURS SCHEDULED
Qty: 20 CAPSULE | Refills: 0 | Status: SHIPPED | OUTPATIENT
Start: 2019-12-05 | End: 2019-12-10

## 2019-12-05 RX ADMIN — ACETAMINOPHEN 650 MG: 325 TABLET ORAL at 11:09

## 2019-12-05 NOTE — TELEPHONE ENCOUNTER
LM on VM to [facility] - discuss no change on diagnostic breast imaging and need for ultrasound at time for B/L breast mammo next year

## 2019-12-05 NOTE — CONSULTS
Consult - Podiatry   Karine Olivares 40 y o  female MRN: [id number]  Unit/Bed#: ED 21 Encounter: [id number]    Assessment/Plan     Assessment:  37yo female s/p right foot exostectomy (DOS 10/2/19) with mild surgical site dehiscence    Plan:  - recommend oral Keflex 500mg q6h for 5 days, follow-up with Dr Shelbi Talbot outpatient within one week, this is likely reaction to vicryl sutures as patient does have history of this occurring before on a different surgery   - instructed patient and family to monitor area for increased redness, streaking, purulent drainage, and other signs of infection  If any of these symptoms occur, she is to return to ED for further workup    History of Present Illness   Chief Complaint: "right foot pain"    HPI:  Karine Olivares is a 40 y o  female with no significant PMH  She underwent right foot surgery with Dr Shelbi Talbot on 10/2/19  She is presenting with worsening right foot pain  She states has had a normal postoperative recovery period with no complications thus far  States she noticed some clear fluid draining from her incision site last night  She woke up this morning with increased redness on her foot and pain  She noted the pain in her foot increased to a point where she could no longer bear weight  She called Dr Patricio Chowdhury office and was told to come to ED for further evaluation  States this has happened before on a different surgery and resolved without any complications  States this is similar to that episode  Denies any cloudy purulent drainage, no malodor, nausea, vomiting, fever, chills, shortness of breath, chest pain  Consults  Review of Systems   Constitutional: Negative  HENT: Negative  Eyes: Negative  Respiratory: Negative  Cardiovascular: Negative  Gastrointestinal: Negative  Musculoskeletal: right foot pain   Skin: right foot redness and drainage   Neurological: Negative  Psych: negative         Historical Information   Past Medical History:   Diagnosis Date    Anemia     iron def    Bulla of lung ([address] )     Coccydynia     Family history of vitamin B12 deficiency     Fibroid, uterine     Hematuria     last assessed: 12/11/2013    History of hypertension     History of morbid obesity     History of vitamin B deficiency     History of vitamin D deficiency     Hypertension     Hypocalcemia     Hypocalcemia     Iron deficiency     Iron deficiency     Kyphosis deformity of spine     Menopause     Migraine     Morbid (severe) obesity due to excess calories ([address] )     per [employer]scripts-last assessed: 4/11/2014    Narrow angle glaucoma suspect of both eyes     PONV (postoperative nausea and vomiting)     nausea only    Postgastrectomy malabsorption     Vitamin A deficiency     last assessed: 11/11/2014     Past Surgical History:   Procedure Laterality Date    APPENDECTOMY      AUGMENTATION MAMMAPLASTY      BRAIN SURGERY  1990    AVM    BREAST BIOPSY      BREAST LUMPECTOMY Right 1992    BUNIONECTOMY Right 10/2/2019    Procedure: EXOSTECTOMY RIGHT FOOT;  Surgeon: Mallory Roman DPM;  Location[facility] MAIN OR;  Service: Podiatry   [address]    COSMETIC SURGERY      tummy tuck,breast implants and lift    ENDOMETRIAL ABLATION  08/2018    IA LAP, SUPRACERVIAL HYSTERECTOMY W/ TUBE&OV, <250G N/A 9/16/2019    Procedure: [address]  WITH B/L SALPINGECTOMY   EXCISION OF ENDOMETRIAL CYST;  Surgeon: Yfn Lozano DO;  Location: [facility];  Service: Gynecology    REFRACTIVE SURGERY      glaucoma    HITESH-EN-Y PROCEDURE  2014    TONSILLECTOMY AND ADENOIDECTOMY      including adenoids    TOOTH EXTRACTION      TUBAL LIGATION      US GUIDANCE BREAST BIOPSY LEFT EACH ADDITIONAL Left 10/12/2018    US GUIDED BREAST BIOPSY LEFT COMPLETE Left 10/12/2018     Social History   Social History     Substance and Sexual Activity   Alcohol Use Yes    Alcohol/week: 1 0 standard drinks    Types: 1 Standard drinks or equivalent per week    Frequency: Monthly or less    Drinks per session: 1 or 2    Binge frequency: Never    Comment: social     Social History     Substance and Sexual Activity   Drug Use No     Social History     Tobacco Use   Smoking Status Never Smoker   Smokeless Tobacco Never Used     Family History:   Family History   Problem Relation Age of Onset    Hypertension Mother     Atrial fibrillation Mother     Melanoma Mother     Cancer Mother         on left upper chest    Graves' disease Mother     Hypertension Father     Atrial fibrillation Father     Diabetes Father     Obesity Father     Allergies Father     Heart disease Father     Stomach cancer Maternal Aunt     Other Maternal Aunt         GIST, malignant-per Allscripts    Stomach cancer Paternal Aunt     Cancer Maternal Grandfather         Lung    Stroke Paternal Grandmother     Cancer Paternal Grandfather         Lung    Depression Neg Hx     Anxiety disorder Neg Hx     Drug abuse Neg Hx        Meds/Allergies     (Not in a hospital admission)  Allergies   Allergen Reactions    Pollen Extract Allergic Rhinitis       Objective   First Vitals:   Blood Pressure: 110/74 (12/05/19 1023)  Pulse: 71 (12/05/19 1023)  Temperature: 98 3 °F (36 8 °C) (12/05/19 1023)  Temp Source: Oral (12/05/19 1023)  Respirations: 16 (12/05/19 1023)  Weight - Scale: 68 9 kg (151 lb 14 4 oz) (12/05/19 1023)  SpO2: 98 % (12/05/19 1023)    Current Vitals:   Blood Pressure: 109/67 (12/05/19 1216)  Pulse: 62 (12/05/19 1216)  Temperature: 98 3 °F (36 8 °C) (12/05/19 1023)  Temp Source: Oral (12/05/19 1023)  Respirations: 16 (12/05/19 1216)  Weight - Scale: 68 9 kg (151 lb 14 4 oz) (12/05/19 1023)  SpO2: 98 % (12/05/19 1216)        /67 (BP Location: Left arm)   Pulse 62   Temp 98 3 °F (36 8 °C) (Oral)   Resp 16   Wt 68 9 kg (151 lb 14 4 oz)   LMP 09/09/2019   SpO2 98%   BMI 23 79 kg/m²      General Appearance:    Alert, cooperative, no distress   Head: Normocephalic, without obvious abnormality, atraumatic   Eyes:    PERRL, conjunctiva/corneas clear, EOM's intact        Nose:   Moist mucous membranes   Neck:   Supple, symmetrical, trachea midline   Back:     Symmetric   Lungs:     Respirations unlabored, clear to ascultations    Heart:    Regular rate and rhythm, S1 and S2 normal, no murmur, rub   or gallop   Abdomen:     Soft, non-tender     Lower Extremities     Dermatologic:      Vascular:   Right dorsal foot with mild surgical site dehiscence, serous drainage, no malodor, no purulence, mild erythema surrounding incision site    Pedal pulses palpable   Musculoskeletal:   MSK function WNL   Neurologic:   Gross sensation is intact  Protective sensation is intact                 Lab Results:   Admission on 12/05/2019   Component Date Value    WBC 12/05/2019 9 57     RBC 12/05/2019 4 40     Hemoglobin 12/05/2019 13 7     Hematocrit 12/05/2019 42 0     MCV 12/05/2019 96     MCH 12/05/2019 31 1     MCHC 12/05/2019 32 6     RDW 12/05/2019 12 4     MPV 12/05/2019 9 8     Platelets 64/82/7928 198     nRBC 12/05/2019 0     Neutrophils Relative 12/05/2019 77*    Immat GRANS % 12/05/2019 1     Lymphocytes Relative 12/05/2019 12*    Monocytes Relative 12/05/2019 8     Eosinophils Relative 12/05/2019 1     Basophils Relative 12/05/2019 1     Neutrophils Absolute 12/05/2019 7 42     Immature Grans Absolute 12/05/2019 0 05     Lymphocytes Absolute 12/05/2019 1 16     Monocytes Absolute 12/05/2019 0 77     Eosinophils Absolute 12/05/2019 0 11     Basophils Absolute 12/05/2019 0 06     Sodium 12/05/2019 139     Potassium 12/05/2019 4 1     Chloride 12/05/2019 105     CO2 12/05/2019 28     ANION GAP 12/05/2019 6     BUN 12/05/2019 11     Creatinine 12/05/2019 0 66     Glucose 12/05/2019 88     Calcium 12/05/2019 8 5     AST 12/05/2019 15     ALT 12/05/2019 13     Alkaline Phosphatase 12/05/2019 68     Total Protein 12/05/2019 6 3*    Albumin 12/05/2019 3 4*    Total Bilirubin 12/05/2019 1 20*    eGFR 12/05/2019 108        Imaging: I have personally reviewed pertinent films in PACS  EKG, Pathology, and Other Studies: I have personally reviewed pertinent reports  Code Status: No Order      Portions of the record may have been created with voice recognition software  Occasional wrong word or "sound a like" substitutions may have occurred due to the inherent limitations of voice recognition software  Read the chart carefully and recognize, using context, where substitutions have occurred

## 2019-12-05 NOTE — ED PROVIDER NOTES
History  Chief Complaint   Patient presents with    Foot Swelling     Patient reports surgery to dorsum of foot on 10/3 to remove bone spur  This morning she awoke with swelling over the incision line and with small open wound at the top of the incision line  This is a 26-year-old female who presents with a right foot wound  On October 2nd, the patient had a exostectomy performed on the dorsum of the right foot  Few days ago, the patient noticed slight separation of the wound itself  Starting last night, she noticed serous drainage from the site  She has also noticed increased pain especially with ambulation  She called her podiatrist today who told her to come to the emergency department for the resident to evaluate her  Denies fever/chills, nausea/vomiting, lightheadedness/dizziness, numbness/weakness, headache, change in vision, URI symptoms, neck pain, chest pain, palpitations, shortness of breath, cough, back pain, flank pain, abdominal pain, diarrhea, hematochezia, melena, dysuria, hematuria, abnormal vaginal discharge/bleeding  Prior to Admission Medications   Prescriptions Last Dose Informant Patient Reported? Taking?    Ascorbic Acid ([facility]S VITAMIN C) 500 MG CHEW 12/4/2019 at Unknown time Self Yes Yes   Sig: Chew 1 tablet 2 (two) times a day   acetaminophen (TYLENOL) 500 mg tablet 12/5/2019 at Unknown time  Yes Yes   Sig: Take 500 mg by mouth every 6 (six) hours as needed for mild pain   butalbital-acetaminophen-caffeine (FIORICET,ESGIC) -40 mg per tablet More than a month at Unknown time Self No No   Sig: Take 1 tablet by mouth every 4 (four) hours as needed for headaches   calcium carbonate-vitamin D (OSCAL-D) 500 mg-200 units per tablet 12/4/2019 at Unknown time Self Yes Yes   Sig: Take 1 tablet by mouth 2 (two) times a day with meals   cyanocobalamin (VITAMIN B-12) 500 mcg tablet 12/4/2019 at Unknown time Self Yes Yes   Sig: Take 500 mcg by mouth daily   ferrous sulfate (CVS IRON) 325 (65 Fe) mg tablet 12/4/2019 at Unknown time Self Yes Yes   Sig: Take 325 mg by mouth 2 (two) times a day   multivitamin (THERAGRAN) TABS 12/4/2019 at Unknown time Self Yes Yes   Sig: Take 1 tablet by mouth 2 (two) times a day      Facility-Administered Medications: None       Past Medical History:   Diagnosis Date    Anemia     iron def    Bulla of lung ([address] )     Coccydynia     Family history of vitamin B12 deficiency     Fibroid, uterine     Hematuria     last assessed: 12/11/2013    History of hypertension     History of morbid obesity     History of vitamin B deficiency     History of vitamin D deficiency     Hypertension     Hypocalcemia     Hypocalcemia     Iron deficiency     Iron deficiency     Kyphosis deformity of spine     Menopause     Migraine     Morbid (severe) obesity due to excess calories ([address] )     per [employer]-last assessed: 4/11/2014    Narrow angle glaucoma suspect of both eyes     PONV (postoperative nausea and vomiting)     nausea only    Postgastrectomy malabsorption     Vitamin A deficiency     last assessed: 11/11/2014       Past Surgical History:   Procedure Laterality Date    APPENDECTOMY      AUGMENTATION MAMMAPLASTY      BRAIN SURGERY  1990    AVM    BREAST BIOPSY      BREAST LUMPECTOMY Right 1992    BUNIONECTOMY Right 10/2/2019    Procedure: EXOSTECTOMY RIGHT FOOT;  Surgeon: Edilson White DPM;  Location: [facility] MAIN OR;  Service: Podiatry   [place] [address]    COSMETIC SURGERY      tummy tuck,breast implants and lift    ENDOMETRIAL ABLATION  08/2018    MD LAP, SUPRACERVIAL HYSTERECTOMY W/ TUBE&OV, <250G N/A 9/16/2019    Procedure: HYSTERECTOMY LAPAROSCOPIC SUPRACERVICAL ([address])  WITH B/L SALPINGECTOMY   EXCISION OF ENDOMETRIAL CYST;  Surgeon: Marcos Moon DO;  Location: [facility] Main OR;  Service: Gynecology    REFRACTIVE SURGERY      glaucoma    HITESH-EN-Y PROCEDURE  2014    TONSILLECTOMY AND ADENOIDECTOMY      including adenoids    TOOTH EXTRACTION      TUBAL LIGATION      US GUIDANCE BREAST BIOPSY LEFT EACH ADDITIONAL Left 10/12/2018    US GUIDED BREAST BIOPSY LEFT COMPLETE Left 10/12/2018       Family History   Problem Relation Age of Onset    Hypertension Mother     Atrial fibrillation Mother     Melanoma Mother     Cancer Mother         on left upper chest    Graves' disease Mother     Hypertension Father     Atrial fibrillation Father     Diabetes Father     Obesity Father     Allergies Father     Heart disease Father     Stomach cancer Maternal Aunt     Other Maternal Aunt         GIST, malignant-per [employer]    Stomach cancer Paternal Aunt     Cancer Maternal Grandfather         Lung    Stroke Paternal Grandmother     Cancer Paternal Grandfather         Lung    Depression Neg Hx     Anxiety disorder Neg Hx     Drug abuse Neg Hx      I have reviewed and agree with the history as documented  Social History     Tobacco Use    Smoking status: Never Smoker    Smokeless tobacco: Never Used   Substance Use Topics    Alcohol use: Yes     Alcohol/week: 1 0 standard drinks     Types: 1 Standard drinks or equivalent per week     Frequency: Monthly or less     Drinks per session: 1 or 2     Binge frequency: Never     Comment: social    Drug use: No        Review of Systems   Constitutional: Negative for chills and fever  HENT: Negative for rhinorrhea, sore throat and trouble swallowing  Eyes: Negative for photophobia and visual disturbance  Respiratory: Negative for cough, chest tightness and shortness of breath  Cardiovascular: Negative for chest pain, palpitations and leg swelling  Gastrointestinal: Negative for abdominal pain, blood in stool, diarrhea, nausea and vomiting  Endocrine: Negative for polyuria  Genitourinary: Negative for dysuria, flank pain, hematuria, vaginal bleeding and vaginal discharge  Musculoskeletal: Negative for back pain and neck pain  Skin: Positive for wound   Negative for color change and rash  Allergic/Immunologic: Negative for immunocompromised state  Neurological: Negative for dizziness, weakness, light-headedness, numbness and headaches  All other systems reviewed and are negative  Physical Exam  Physical Exam   Constitutional: Vital signs are normal  She appears well-developed  She is cooperative  No distress  HENT:   Mouth/Throat: Uvula is midline, oropharynx is clear and moist and mucous membranes are normal    Eyes: Pupils are equal, round, and reactive to light  Conjunctivae and EOM are normal    Neck: Trachea normal  No thyroid mass and no thyromegaly present  Cardiovascular: Normal rate, regular rhythm, normal heart sounds, intact distal pulses and normal pulses  No murmur heard  Pulmonary/Chest: Effort normal and breath sounds normal    Abdominal: Soft  Normal appearance and bowel sounds are normal  There is no tenderness  There is no rebound, no guarding and no CVA tenderness  Musculoskeletal:   Surgical site on dorsum of right foot   (picture in chart) serous drainage noted from the surgical wound  Tenderness surrounding wound  No erythema, warmth, fluctuance, induration to indicate infection  Mild dehiscence at proximal wound  DP and PT pulses are 2+   Neurological: She is alert  Skin: Skin is warm, dry and intact  Psychiatric: She has a normal mood and affect   Her speech is normal and behavior is normal  Thought content normal        Vital Signs  ED Triage Vitals [12/05/19 1023]   Temperature Pulse Respirations Blood Pressure SpO2   98 3 °F (36 8 °C) 71 16 110/74 98 %      Temp Source Heart Rate Source Patient Position - Orthostatic VS BP Location FiO2 (%)   Oral Monitor Lying Left arm --      Pain Score       6           Vitals:    12/05/19 1023 12/05/19 1216   BP: 110/74 109/67   Pulse: 71 62   Patient Position - Orthostatic VS: Lying Lying         Visual Acuity      ED Medications  Medications   acetaminophen (TYLENOL) tablet 650 mg (650 mg Oral Given 12/5/19 1109)       Diagnostic Studies  Results Reviewed     Procedure Component Value Units Date/Time    Comprehensive metabolic panel [[id number]]  (Abnormal) Collected:  12/05/19 1226    Lab Status:  Final result Specimen:  Blood from Arm, Right Updated:  12/05/19 1259     Sodium 139 mmol/L      Potassium 4 1 mmol/L      Chloride 105 mmol/L      CO2 28 mmol/L      ANION GAP 6 mmol/L      BUN 11 mg/dL      Creatinine 0 66 mg/dL      Glucose 88 mg/dL      Calcium 8 5 mg/dL      AST 15 U/L      ALT 13 U/L      Alkaline Phosphatase 68 U/L      Total Protein 6 3 g/dL      Albumin 3 4 g/dL      Total Bilirubin 1 20 mg/dL      eGFR 108 ml/min/1 73sq m     Narrative:       National Kidney Disease Foundation guidelines for Chronic Kidney Disease (CKD):     Stage 1 with normal or high GFR (GFR > 90 mL/min/1 73 square meters)    Stage 2 Mild CKD (GFR = 60-89 mL/min/1 73 square meters)    Stage 3A Moderate CKD (GFR = 45-59 mL/min/1 73 square meters)    Stage 3B Moderate CKD (GFR = 30-44 mL/min/1 73 square meters)    Stage 4 Severe CKD (GFR = 15-29 mL/min/1 73 square meters)    Stage 5 End Stage CKD (GFR <15 mL/min/1 73 square meters)  Note: GFR calculation is accurate only with a steady state creatinine    CBC and differential [[id number]]  (Abnormal) Collected:  12/05/19 1226    Lab Status:  Final result Specimen:  Blood from Arm, Right Updated:  12/05/19 1236     WBC 9 57 Thousand/uL      RBC 4 40 Million/uL      Hemoglobin 13 7 g/dL      Hematocrit 42 0 %      MCV 96 fL      MCH 31 1 pg      MCHC 32 6 g/dL      RDW 12 4 %      MPV 9 8 fL      Platelets 588 Thousands/uL      nRBC 0 /100 WBCs      Neutrophils Relative 77 %      Immat GRANS % 1 %      Lymphocytes Relative 12 %      Monocytes Relative 8 %      Eosinophils Relative 1 %      Basophils Relative 1 %      Neutrophils Absolute 7 42 Thousands/µL      Immature Grans Absolute 0 05 Thousand/uL      Lymphocytes Absolute 1 16 Thousands/µL      Monocytes Absolute 0 77 Thousand/µL      Eosinophils Absolute 0 11 Thousand/µL      Basophils Absolute 0 06 Thousands/µL                  XR foot 3+ views RIGHT   ED Interpretation by Sunshine Wynn MD (12/05 1201)   No acute osseous abnormality, specifically no signs of osteomyelitis as interpreted by myself  Final Result by Isela Edwards DO (12/05 1243)      No acute osseous abnormality  Workstation performed: [id number]                    Procedures  Procedures         ED Course  ED Course as of Dec 05 1317   Thu Dec 05, 2019   1214 I spoke with Podiatry  They recommend CBC and CMP in addition to the x-ray  They will see the patient  [address] to see patient  1309 Patient seen by Podiatry  Recommend discharging on p o  Keflex for 5 days  Follow up as an outpatient  Likely related to reaction to the deep sutures placed according to Podiatry  MDM  Number of Diagnoses or Management Options  Diagnosis management comments: X-ray right foot  I spoke with Podiatry resident who is currently in a case  She will be unavailable for the next 90 to 120 minutes  Disposition  Final diagnoses:   Visit for wound check     Time reflects when diagnosis was documented in both MDM as applicable and the Disposition within this note     Time User Action Codes Description Comment    12/5/2019  1:10 PM Gerry Champion Add [Z51 89] Visit for wound check       ED Disposition     ED Disposition Condition Date/Time Comment    Discharge Stable Thu Dec 5, 2019  1:09 PM Leslie Alan discharge to home/self care              Follow-up Information     Follow up With Specialties Details Why Contact Info Additional Information    Radha Cardoza MD Internal Medicine Schedule an appointment as soon as possible for a visit   [address] Marcos Ruelas DPM Podiatry, Wound Care Schedule an appointment as soon as possible for a visit   [address]  [address] [place] [address] [address]       [address] Emergency Medicine Go to  If symptoms worsen [facility] [place] [address] [phone] [place] [facility], [address] [facility] [place], [place], [address]          Patient's Medications   Discharge Prescriptions    CEPHALEXIN (KEFLEX) 500 MG CAPSULE    Take 1 capsule (500 mg total) by mouth every 6 (six) hours for 5 days       Start Date: 12/5/2019 End Date: 12/10/2019       Order Dose: 500 mg       Quantity: 20 capsule    Refills: 0     No discharge procedures on file      ED Provider  Electronically Signed by           Chitra Woods MD  12/05/19 4006

## 2020-04-14 ENCOUNTER — TELEMEDICINE (OUTPATIENT)
Dept: BARIATRICS | Facility: CLINIC | Age: 45
End: 2020-04-14
Payer: COMMERCIAL

## 2020-04-14 VITALS — BODY MASS INDEX: 24.33 KG/M2 | HEIGHT: 67 IN | WEIGHT: 155 LBS

## 2020-04-14 DIAGNOSIS — Z48.815 ENCOUNTER FOR SURGICAL AFTERCARE FOLLOWING SURGERY OF DIGESTIVE SYSTEM: Primary | ICD-10-CM

## 2020-04-14 DIAGNOSIS — K91.2 POSTSURGICAL MALABSORPTION: ICD-10-CM

## 2020-04-14 DIAGNOSIS — R53.83 FATIGUE: ICD-10-CM

## 2020-04-14 DIAGNOSIS — Z98.84 BARIATRIC SURGERY STATUS: ICD-10-CM

## 2020-04-14 PROBLEM — N94.6 DYSMENORRHEA: Status: RESOLVED | Noted: 2019-08-28 | Resolved: 2020-04-14

## 2020-04-14 PROCEDURE — 3008F BODY MASS INDEX DOCD: CPT | Performed by: PHYSICIAN ASSISTANT

## 2020-04-14 PROCEDURE — 99213 OFFICE O/P EST LOW 20 MIN: CPT | Performed by: PHYSICIAN ASSISTANT

## 2020-04-20 ENCOUNTER — APPOINTMENT (OUTPATIENT)
Dept: LAB | Age: 45
End: 2020-04-20
Payer: COMMERCIAL

## 2020-04-20 DIAGNOSIS — K91.2 POSTSURGICAL MALABSORPTION: ICD-10-CM

## 2020-04-20 DIAGNOSIS — Z98.84 BARIATRIC SURGERY STATUS: ICD-10-CM

## 2020-04-20 DIAGNOSIS — Z48.815 ENCOUNTER FOR SURGICAL AFTERCARE FOLLOWING SURGERY OF DIGESTIVE SYSTEM: ICD-10-CM

## 2020-04-20 DIAGNOSIS — R53.83 FATIGUE: ICD-10-CM

## 2020-04-20 LAB
25(OH)D3 SERPL-MCNC: 27.7 NG/ML (ref 30–100)
ALBUMIN SERPL BCP-MCNC: 3.6 G/DL (ref 3.5–5)
ALP SERPL-CCNC: 76 U/L (ref 46–116)
ALT SERPL W P-5'-P-CCNC: 20 U/L (ref 12–78)
ANION GAP SERPL CALCULATED.3IONS-SCNC: 4 MMOL/L (ref 4–13)
AST SERPL W P-5'-P-CCNC: 17 U/L (ref 5–45)
BILIRUB SERPL-MCNC: 0.99 MG/DL (ref 0.2–1)
BUN SERPL-MCNC: 17 MG/DL (ref 5–25)
CALCIUM SERPL-MCNC: 8.7 MG/DL (ref 8.3–10.1)
CHLORIDE SERPL-SCNC: 107 MMOL/L (ref 100–108)
CO2 SERPL-SCNC: 28 MMOL/L (ref 21–32)
CREAT SERPL-MCNC: 0.68 MG/DL (ref 0.6–1.3)
ERYTHROCYTE [DISTWIDTH] IN BLOOD BY AUTOMATED COUNT: 13 % (ref 11.6–15.1)
FERRITIN SERPL-MCNC: 24 NG/ML (ref 8–388)
FOLATE SERPL-MCNC: 16.8 NG/ML (ref 3.1–17.5)
GFR SERPL CREATININE-BSD FRML MDRD: 107 ML/MIN/1.73SQ M
GLUCOSE P FAST SERPL-MCNC: 81 MG/DL (ref 65–99)
HCT VFR BLD AUTO: 44.8 % (ref 34.8–46.1)
HGB BLD-MCNC: 14.7 G/DL (ref 11.5–15.4)
IRON SATN MFR SERPL: 31 %
IRON SERPL-MCNC: 119 UG/DL (ref 50–170)
MCH RBC QN AUTO: 30.9 PG (ref 26.8–34.3)
MCHC RBC AUTO-ENTMCNC: 32.8 G/DL (ref 31.4–37.4)
MCV RBC AUTO: 94 FL (ref 82–98)
PLATELET # BLD AUTO: 214 THOUSANDS/UL (ref 149–390)
PMV BLD AUTO: 10.4 FL (ref 8.9–12.7)
POTASSIUM SERPL-SCNC: 4.4 MMOL/L (ref 3.5–5.3)
PROT SERPL-MCNC: 6.7 G/DL (ref 6.4–8.2)
PTH-INTACT SERPL-MCNC: 46.7 PG/ML (ref 18.4–80.1)
RBC # BLD AUTO: 4.76 MILLION/UL (ref 3.81–5.12)
SODIUM SERPL-SCNC: 139 MMOL/L (ref 136–145)
TIBC SERPL-MCNC: 387 UG/DL (ref 250–450)
VIT B12 SERPL-MCNC: 613 PG/ML (ref 100–900)
WBC # BLD AUTO: 5.2 THOUSAND/UL (ref 4.31–10.16)

## 2020-04-20 PROCEDURE — 85027 COMPLETE CBC AUTOMATED: CPT

## 2020-04-20 PROCEDURE — 83540 ASSAY OF IRON: CPT

## 2020-04-20 PROCEDURE — 82306 VITAMIN D 25 HYDROXY: CPT

## 2020-04-20 PROCEDURE — 36415 COLL VENOUS BLD VENIPUNCTURE: CPT

## 2020-04-20 PROCEDURE — 84630 ASSAY OF ZINC: CPT

## 2020-04-20 PROCEDURE — 82746 ASSAY OF FOLIC ACID SERUM: CPT

## 2020-04-20 PROCEDURE — 84590 ASSAY OF VITAMIN A: CPT

## 2020-04-20 PROCEDURE — 82607 VITAMIN B-12: CPT

## 2020-04-20 PROCEDURE — 84425 ASSAY OF VITAMIN B-1: CPT

## 2020-04-20 PROCEDURE — 83550 IRON BINDING TEST: CPT

## 2020-04-20 PROCEDURE — 83970 ASSAY OF PARATHORMONE: CPT

## 2020-04-20 PROCEDURE — 80053 COMPREHEN METABOLIC PANEL: CPT

## 2020-04-20 PROCEDURE — 82728 ASSAY OF FERRITIN: CPT

## 2020-04-22 LAB
VIT A SERPL-MCNC: 16.7 UG/DL (ref 20.1–62)
VIT B1 BLD-SCNC: 142.6 NMOL/L (ref 66.5–200)
ZINC SERPL-MCNC: 75 UG/DL (ref 56–134)

## 2020-04-24 DIAGNOSIS — Z98.84 BARIATRIC SURGERY STATUS: ICD-10-CM

## 2020-04-24 DIAGNOSIS — E50.9 VITAMIN A DEFICIENCY: Primary | ICD-10-CM

## 2020-04-24 DIAGNOSIS — K91.2 POSTSURGICAL MALABSORPTION: ICD-10-CM

## 2020-04-24 DIAGNOSIS — Z48.815 ENCOUNTER FOR SURGICAL AFTERCARE FOLLOWING SURGERY OF DIGESTIVE SYSTEM: ICD-10-CM

## 2020-08-11 ENCOUNTER — RX ONLY (RX ONLY)
Age: 45
End: 2020-08-11

## 2020-08-11 ENCOUNTER — DOCTOR'S OFFICE (OUTPATIENT)
Dept: URBAN - METROPOLITAN AREA CLINIC 136 | Facility: CLINIC | Age: 45
Setting detail: OPHTHALMOLOGY
End: 2020-08-11
Payer: COMMERCIAL

## 2020-08-11 ENCOUNTER — OPTICAL OFFICE (OUTPATIENT)
Dept: URBAN - METROPOLITAN AREA CLINIC 143 | Facility: CLINIC | Age: 45
Setting detail: OPHTHALMOLOGY
End: 2020-08-11
Payer: COMMERCIAL

## 2020-08-11 DIAGNOSIS — H52.4: ICD-10-CM

## 2020-08-11 DIAGNOSIS — H52.02: ICD-10-CM

## 2020-08-11 DIAGNOSIS — H52.221: ICD-10-CM

## 2020-08-11 PROCEDURE — V2200 LENS SPHER BIFOC PLANO 4.00D: HCPCS | Performed by: OPTOMETRIST

## 2020-08-11 PROCEDURE — 92015 DETERMINE REFRACTIVE STATE: CPT | Performed by: OPTOMETRIST

## 2020-08-11 PROCEDURE — 92012 INTRM OPH EXAM EST PATIENT: CPT | Performed by: OPTOMETRIST

## 2020-08-11 PROCEDURE — V2203 LENS SPHCYL BIFOCAL 4.00D/.1: HCPCS | Performed by: OPTOMETRIST

## 2020-08-11 PROCEDURE — V2020 VISION SVCS FRAMES PURCHASES: HCPCS | Performed by: OPTOMETRIST

## 2020-08-11 PROCEDURE — 92310 CONTACT LENS FITTING OU: CPT | Performed by: OPTOMETRIST

## 2020-08-11 ASSESSMENT — REFRACTION_CURRENTRX
OS_OVR_VA: 20/
OD_VPRISM_DIRECTION: BF
OS_VPRISM_DIRECTION: BF
OD_OVR_VA: 20/
OS_ADD: +1.25
OS_SPHERE: PLANO
OD_SPHERE: PLANO
OD_ADD: +1.25

## 2020-08-11 ASSESSMENT — REFRACTION_AUTOREFRACTION
OS_CYLINDER: 0.00
OS_SPHERE: +0.50
OD_CYLINDER: -0.75
OD_AXIS: 167
OD_SPHERE: +0.75

## 2020-08-11 ASSESSMENT — REFRACTION_MANIFEST
OD_ADD: +1.50
OS_VA1: 20/20
OD_VA1: 20/20
OS_ADD: +1.50
OS_CYLINDER: SPH
OD_AXIS: 165
OS_SPHERE: +0.50
OD_CYLINDER: -0.50
OU_VA: 20/20
OD_SPHERE: PLANO

## 2020-08-11 ASSESSMENT — VISUAL ACUITY
OD_BCVA: 20/20-1
OS_BCVA: 20/20

## 2020-08-11 ASSESSMENT — SPHEQUIV_DERIVED
OD_SPHEQUIV: 0.375
OS_SPHEQUIV: 0.5

## 2020-08-11 ASSESSMENT — CONFRONTATIONAL VISUAL FIELD TEST (CVF)
OD_FINDINGS: FULL
OS_FINDINGS: FULL

## 2020-08-27 ENCOUNTER — DOCTOR'S OFFICE (OUTPATIENT)
Dept: URBAN - METROPOLITAN AREA CLINIC 136 | Facility: CLINIC | Age: 45
Setting detail: OPHTHALMOLOGY
End: 2020-08-27
Payer: COMMERCIAL

## 2020-08-27 DIAGNOSIS — H43.813: ICD-10-CM

## 2020-08-27 DIAGNOSIS — H04.123: ICD-10-CM

## 2020-08-27 DIAGNOSIS — H01.002: ICD-10-CM

## 2020-08-27 DIAGNOSIS — H01.001: ICD-10-CM

## 2020-08-27 PROBLEM — H01.004 BLEPHARITIS; RIGHT UPPER LID, RIGHT LOWER LID, LEFT UPPER LID, LEFT LOWER LID: Status: ACTIVE | Noted: 2020-08-27

## 2020-08-27 PROBLEM — H04.121 DRY EYE SYNDROME; RIGHT EYE, LEFT EYE: Status: ACTIVE | Noted: 2020-08-27

## 2020-08-27 PROBLEM — H10.13 ALLERGIC CONJUNCTIVITS; BOTH EYES: Status: ACTIVE | Noted: 2020-08-27

## 2020-08-27 PROBLEM — Z83.511: Status: ACTIVE | Noted: 2018-09-06

## 2020-08-27 PROBLEM — H04.122 DRY EYE SYNDROME; RIGHT EYE, LEFT EYE: Status: ACTIVE | Noted: 2020-08-27

## 2020-08-27 PROBLEM — H01.005 BLEPHARITIS; RIGHT UPPER LID, RIGHT LOWER LID, LEFT UPPER LID, LEFT LOWER LID: Status: ACTIVE | Noted: 2020-08-27

## 2020-08-27 PROCEDURE — 92014 COMPRE OPH EXAM EST PT 1/>: CPT | Performed by: OPHTHALMOLOGY

## 2020-08-27 ASSESSMENT — REFRACTION_MANIFEST
OD_VA1: 20/20
OD_CYLINDER: -0.50
OU_VA: 20/20
OD_ADD: +1.50
OS_ADD: +1.50
OS_VA1: 20/20
OS_CYLINDER: SPH
OS_SPHERE: +0.50
OD_AXIS: 165
OD_SPHERE: PLANO

## 2020-08-27 ASSESSMENT — REFRACTION_CURRENTRX
OD_VPRISM_DIRECTION: BF
OS_OVR_VA: 20/
OS_SPHERE: PLANO
OS_VPRISM_DIRECTION: BF
OD_SPHERE: PLANO
OD_OVR_VA: 20/
OS_ADD: +1.25
OD_ADD: +1.25

## 2020-08-27 ASSESSMENT — REFRACTION_AUTOREFRACTION
OD_SPHERE: +0.75
OS_CYLINDER: 0.00
OD_AXIS: 167
OD_CYLINDER: -0.75
OS_SPHERE: +0.50

## 2020-08-27 ASSESSMENT — SUPERFICIAL PUNCTATE KERATITIS (SPK)
OS_SPK: 1+
OD_SPK: 1+

## 2020-08-27 ASSESSMENT — VISUAL ACUITY
OS_BCVA: 20/20-2
OD_BCVA: 20/25+1

## 2020-08-27 ASSESSMENT — LID EXAM ASSESSMENTS
OS_BLEPHARITIS: 1+
OD_BLEPHARITIS: 1+

## 2020-08-27 ASSESSMENT — CONFRONTATIONAL VISUAL FIELD TEST (CVF)
OD_FINDINGS: FULL
OS_FINDINGS: FULL

## 2020-08-27 ASSESSMENT — SPHEQUIV_DERIVED
OS_SPHEQUIV: 0.5
OD_SPHEQUIV: 0.375

## 2020-09-28 ENCOUNTER — TELEPHONE (OUTPATIENT)
Dept: BARIATRICS | Facility: CLINIC | Age: 45
End: 2020-09-28

## 2020-09-28 NOTE — TELEPHONE ENCOUNTER
Patient's iron studies were within normal limits in April, so does not warrant an infusion  If patient is experiencing any symptoms she can come in sooner  Her next appt is with Korin Blanco in April but she can come in to see thai sooner if necessary  Thanks!

## 2020-09-28 NOTE — TELEPHONE ENCOUNTER
Pt called stating that she feels like she is not absorbing the iron with pills   She is requesting infusion

## 2020-09-28 NOTE — TELEPHONE ENCOUNTER
I spoke with patient  She states she will call and schedule an appointment   She was unable to now because she is at work~cd

## 2020-09-29 ENCOUNTER — ANNUAL EXAM (OUTPATIENT)
Dept: GYNECOLOGY | Facility: CLINIC | Age: 45
End: 2020-09-29
Payer: COMMERCIAL

## 2020-09-29 VITALS
BODY MASS INDEX: 24.48 KG/M2 | HEIGHT: 67 IN | DIASTOLIC BLOOD PRESSURE: 60 MMHG | WEIGHT: 156 LBS | SYSTOLIC BLOOD PRESSURE: 90 MMHG | HEART RATE: 82 BPM

## 2020-09-29 DIAGNOSIS — Z01.419 ENCOUNTER FOR GYNECOLOGICAL EXAMINATION WITHOUT ABNORMAL FINDING: Primary | ICD-10-CM

## 2020-09-29 DIAGNOSIS — Z01.419 ENCOUNTER FOR GYNECOLOGICAL EXAMINATION WITH PAPANICOLAOU SMEAR OF CERVIX: ICD-10-CM

## 2020-09-29 PROCEDURE — G0145 SCR C/V CYTO,THINLAYER,RESCR: HCPCS | Performed by: OBSTETRICS & GYNECOLOGY

## 2020-09-29 PROCEDURE — S0612 ANNUAL GYNECOLOGICAL EXAMINA: HCPCS | Performed by: OBSTETRICS & GYNECOLOGY

## 2020-09-29 NOTE — PROGRESS NOTES
Assessment/Plan:         Diagnoses and all orders for this visit:    Encounter for gynecological examination without abnormal finding    Other orders  -     Cholecalciferol ([employer]S VIT D 5000 HIGH-POTENCY PO); Take by mouth        Subjective:      Patient ID: Luis Alfredo Spring is a 39 y o  female  HPI  patient presents for annual examination  She offers no complaints  She is status post [facility] BS with excision of endometriosis in September 2019  This was for and adenomyosis and leiomyomata and endometriosis  She is doing well since the surgery with no complaints  Denies any vaginal irritation, burning, discharge or bleeding  No vasomotor complaints  No dysuria, hematuria urgency or stress urinary incontinence  No GI complaints  The following portions of the patient's history were reviewed and updated as appropriate:   She  has a past medical history of Anemia, Bulla of lung ([address] ), Coccydynia, Family history of vitamin B12 deficiency, Fibroid, uterine, Hematuria, History of hypertension, History of morbid obesity, History of vitamin B deficiency, History of vitamin D deficiency, Hypertension, Hypocalcemia, Hypocalcemia, Iron deficiency, Iron deficiency, Kyphosis deformity of spine, Menopause, Migraine, Morbid (severe) obesity due to excess calories (HCC), Narrow angle glaucoma suspect of both eyes, PONV (postoperative nausea and vomiting), Postgastrectomy malabsorption, and Vitamin A deficiency    She   Patient Active Problem List    Diagnosis Date Noted    Encounter for surgical aftercare following surgery of digestive system 04/14/2020    Fatigue 04/14/2020    Status post laparoscopic supracervical hysterectomy 09/16/2019    Preop examination 09/11/2019    Exostosis of right foot 09/11/2019    Adenomyosis 08/28/2019    Bariatric surgery status 08/28/2018    Postsurgical malabsorption 08/28/2018    Abnormal CT of the chest 06/23/2017    Bulla, lung ([address] ) 06/23/2017    Anemia, iron deficiency 2017    Coccydynia 2016    Acute right-sided low back pain with right-sided sciatica 2014    Neoplasm of skin 2014     She  has a past surgical history that includes Marilee-en-y procedure ();  section (); Breast lumpectomy (Right, ); Brain surgery (); Appendectomy; Endometrial ablation (2018); US guided breast biopsy left complete (Left, 10/12/2018); US guidance breast biopsy left each additional (Left, 10/12/2018); Breast biopsy; Augmentation mammaplasty; Cosmetic surgery; Refractive surgery; Tooth extraction; Tonsillectomy and adenoidectomy; Tubal ligation; pr lap, supracervial hysterectomy w/ tube&ov, <250g (N/A, 2019); and Bunionectomy (Right, 10/2/2019)  Her family history includes Allergies in her father; Atrial fibrillation in her father and mother; Cancer in her maternal grandfather, mother, and paternal grandfather; Diabetes in her father; Shereen Panning' disease in her mother; Heart disease in her father; Hypertension in her father and mother; Melanoma in her mother; Obesity in her father; Other in her maternal aunt; Stomach cancer in her maternal aunt and paternal aunt; Stroke in her paternal grandmother  She  reports that she has never smoked  She has never used smokeless tobacco  She reports current alcohol use of about 1 0 standard drinks of alcohol per week  She reports that she does not use drugs    Current Outpatient Medications   Medication Sig Dispense Refill    Ascorbic Acid (CVS VITAMIN C) 500 MG CHEW Chew 1 tablet 2 (two) times a day      calcium carbonate-vitamin D (OSCAL-D) 500 mg-200 units per tablet Take 1 tablet by mouth 2 (two) times a day with meals      Cholecalciferol (CVS VIT D 5000 HIGH-POTENCY PO) Take by mouth      cyanocobalamin (VITAMIN B-12) 500 mcg tablet Take 500 mcg by mouth daily      ferrous sulfate (CVS IRON) 325 (65 Fe) mg tablet Take 325 mg by mouth 2 (two) times a day      multivitamin (THERAGRAN) TABS Take 1 tablet by mouth 2 (two) times a day      acetaminophen (TYLENOL) 500 mg tablet Take 500 mg by mouth every 6 (six) hours as needed for mild pain      butalbital-acetaminophen-caffeine (FIORICET,ESGIC) -40 mg per tablet Take 1 tablet by mouth every 4 (four) hours as needed for headaches (Patient not taking: Reported on 9/29/2020) 30 tablet 0     No current facility-administered medications for this visit  Current Outpatient Medications on File Prior to Visit   Medication Sig    Ascorbic Acid (CVS VITAMIN C) 500 MG CHEW Chew 1 tablet 2 (two) times a day    calcium carbonate-vitamin D (OSCAL-D) 500 mg-200 units per tablet Take 1 tablet by mouth 2 (two) times a day with meals    Cholecalciferol (CVS VIT D 5000 HIGH-POTENCY PO) Take by mouth    cyanocobalamin (VITAMIN B-12) 500 mcg tablet Take 500 mcg by mouth daily    ferrous sulfate (CVS IRON) 325 (65 Fe) mg tablet Take 325 mg by mouth 2 (two) times a day    multivitamin (THERAGRAN) TABS Take 1 tablet by mouth 2 (two) times a day    acetaminophen (TYLENOL) 500 mg tablet Take 500 mg by mouth every 6 (six) hours as needed for mild pain    butalbital-acetaminophen-caffeine (FIORICET,ESGIC) -40 mg per tablet Take 1 tablet by mouth every 4 (four) hours as needed for headaches (Patient not taking: Reported on 9/29/2020)     No current facility-administered medications on file prior to visit  She is allergic to pollen extract and suture       Review of Systems   Constitutional: Negative  HENT: Negative for sore throat and trouble swallowing  Gastrointestinal: Negative  Genitourinary: Negative  Objective:      BP 90/60 (BP Location: Left arm)   Pulse 82   Ht 5' 7" (1 702 m)   Wt 70 8 kg (156 lb)   LMP 09/09/2019   BMI 24 43 kg/m²          Physical Exam  Vitals signs reviewed  Constitutional:       Appearance: Normal appearance  She is normal weight  Neck:      Musculoskeletal: Normal range of motion and neck supple   No muscular tenderness  Cardiovascular:      Rate and Rhythm: Normal rate and regular rhythm  Pulses: Normal pulses  Heart sounds: Normal heart sounds  No murmur  Pulmonary:      Effort: Pulmonary effort is normal  No respiratory distress  Breath sounds: Normal breath sounds  Chest:      Breasts:         Right: No swelling, bleeding, inverted nipple, mass, nipple discharge, skin change or tenderness  Left: No swelling, bleeding, inverted nipple, mass, nipple discharge, skin change or tenderness  Comments: Bilateral implants  Possible displacement of right implant  Abdominal:      General: Abdomen is flat  Bowel sounds are normal  There is no distension  Palpations: Abdomen is soft  There is no mass  Tenderness: There is no abdominal tenderness  There is no guarding or rebound  Hernia: No hernia is present  There is no hernia in the left inguinal area or right inguinal area  Genitourinary:     General: Normal vulva  Labia:         Right: No rash, tenderness or lesion  Left: No rash, tenderness or lesion  Vagina: Normal       Cervix: Normal       Uterus: Absent  Adnexa: Right adnexa normal and left adnexa normal    Lymphadenopathy:      Cervical: No cervical adenopathy  Lower Body: No right inguinal adenopathy  No left inguinal adenopathy  Neurological:      Mental Status: She is alert

## 2020-10-07 ENCOUNTER — OFFICE VISIT (OUTPATIENT)
Dept: URGENT CARE | Facility: CLINIC | Age: 45
End: 2020-10-07
Payer: COMMERCIAL

## 2020-10-07 VITALS
RESPIRATION RATE: 18 BRPM | HEART RATE: 80 BPM | DIASTOLIC BLOOD PRESSURE: 79 MMHG | TEMPERATURE: 98.1 F | OXYGEN SATURATION: 97 % | SYSTOLIC BLOOD PRESSURE: 110 MMHG

## 2020-10-07 DIAGNOSIS — R09.82 POST-NASAL DRIP: Primary | ICD-10-CM

## 2020-10-07 DIAGNOSIS — Z11.59 SCREENING FOR VIRAL DISEASE: ICD-10-CM

## 2020-10-07 DIAGNOSIS — H92.01 OTALGIA, RIGHT: ICD-10-CM

## 2020-10-07 PROCEDURE — 99202 OFFICE O/P NEW SF 15 MIN: CPT | Performed by: FAMILY MEDICINE

## 2020-10-07 PROCEDURE — U0003 INFECTIOUS AGENT DETECTION BY NUCLEIC ACID (DNA OR RNA); SEVERE ACUTE RESPIRATORY SYNDROME CORONAVIRUS 2 (SARS-COV-2) (CORONAVIRUS DISEASE [COVID-19]), AMPLIFIED PROBE TECHNIQUE, MAKING USE OF HIGH THROUGHPUT TECHNOLOGIES AS DESCRIBED BY CMS-2020-01-R: HCPCS | Performed by: PHYSICIAN ASSISTANT

## 2020-10-09 ENCOUNTER — TELEPHONE (OUTPATIENT)
Dept: URGENT CARE | Facility: CLINIC | Age: 45
End: 2020-10-09

## 2020-10-09 LAB — SARS-COV-2 RNA SPEC QL NAA+PROBE: NOT DETECTED

## 2020-10-12 LAB
LAB AP GYN PRIMARY INTERPRETATION: NORMAL
Lab: NORMAL

## 2020-12-08 ENCOUNTER — HOSPITAL ENCOUNTER (OUTPATIENT)
Dept: ULTRASOUND IMAGING | Facility: CLINIC | Age: 45
Discharge: HOME | End: 2020-12-08
Payer: COMMERCIAL

## 2020-12-08 ENCOUNTER — HOSPITAL ENCOUNTER (OUTPATIENT)
Dept: MAMMOGRAPHY | Facility: CLINIC | Age: 45
Discharge: HOME | End: 2020-12-08
Payer: COMMERCIAL

## 2020-12-08 VITALS — HEIGHT: 67 IN | WEIGHT: 156 LBS | BODY MASS INDEX: 24.48 KG/M2

## 2020-12-08 DIAGNOSIS — R92.8 ABNORMAL MAMMOGRAM: Primary | ICD-10-CM

## 2020-12-08 DIAGNOSIS — Z12.31 VISIT FOR SCREENING MAMMOGRAM: ICD-10-CM

## 2020-12-08 DIAGNOSIS — R92.8 ABNORMAL MAMMOGRAM: ICD-10-CM

## 2020-12-08 DIAGNOSIS — R92.8 ABNORMAL FINDINGS ON DIAGNOSTIC IMAGING OF BREAST: ICD-10-CM

## 2020-12-08 PROCEDURE — 77066 DX MAMMO INCL CAD BI: CPT

## 2020-12-08 PROCEDURE — 76642 ULTRASOUND BREAST LIMITED: CPT

## 2020-12-08 PROCEDURE — G0279 TOMOSYNTHESIS, MAMMO: HCPCS

## 2021-02-03 ENCOUNTER — APPOINTMENT (OUTPATIENT)
Dept: RADIOLOGY | Age: 46
End: 2021-02-03
Payer: COMMERCIAL

## 2021-02-03 ENCOUNTER — OFFICE VISIT (OUTPATIENT)
Dept: INTERNAL MEDICINE CLINIC | Facility: CLINIC | Age: 46
End: 2021-02-03
Payer: COMMERCIAL

## 2021-02-03 VITALS
RESPIRATION RATE: 18 BRPM | TEMPERATURE: 100.1 F | SYSTOLIC BLOOD PRESSURE: 104 MMHG | DIASTOLIC BLOOD PRESSURE: 74 MMHG | HEIGHT: 67 IN | OXYGEN SATURATION: 97 % | HEART RATE: 78 BPM | BODY MASS INDEX: 24.11 KG/M2 | WEIGHT: 153.6 LBS

## 2021-02-03 DIAGNOSIS — M25.561 ACUTE PAIN OF RIGHT KNEE: ICD-10-CM

## 2021-02-03 DIAGNOSIS — M25.561 ACUTE PAIN OF RIGHT KNEE: Primary | ICD-10-CM

## 2021-02-03 PROCEDURE — 99213 OFFICE O/P EST LOW 20 MIN: CPT | Performed by: INTERNAL MEDICINE

## 2021-02-03 PROCEDURE — 3725F SCREEN DEPRESSION PERFORMED: CPT | Performed by: INTERNAL MEDICINE

## 2021-02-03 PROCEDURE — 73562 X-RAY EXAM OF KNEE 3: CPT

## 2021-02-03 NOTE — PROGRESS NOTES
Assessment/Plan:    Acute pain of right knee  Continue Tylenol as needed for pain  Recommend she start using a topical NSAID such as Voltaren gel  Continue stretching exercises  Will order an x-ray for further evaluation  She would like to defer on steroid therapy at this time  Diagnoses and all orders for this visit:    Acute pain of right knee  -     XR knee 3 vw right non injury; Future    Other orders  -     vitamin A 3 MG (59836 UT) capsule; Take 10,000 Units by mouth daily                  Subjective:      Patient ID: Shelly Barfield is a 39 y o  female  Chief Complaint   Patient presents with    Knee Pain     right knee pain going on for some time  burning pulling pain on the inner knee  no swelling or discoloration, dosent remember injury    health maintenance     annual exam due, PHQ9 due       70-year-old female is seen today with persistent right knee pain  She has been experiencing right knee pain since approximately 6-months  She denies any known mechanism of injury  Pain is localized to medial right knee and is described as burning  Knee Pain   The incident occurred more than 1 week ago  There was no injury mechanism  The pain is present in the right knee  The pain is mild  Pertinent negatives include no inability to bear weight, loss of motion, loss of sensation, muscle weakness, numbness or tingling  She reports no foreign bodies present  She has tried acetaminophen for the symptoms  The treatment provided mild relief  The following portions of the patient's history were reviewed and updated as appropriate: allergies, current medications, past family history, past medical history, past social history, past surgical history and problem list     Review of Systems   Constitutional: Negative for activity change, appetite change, chills, diaphoresis, fatigue and fever     HENT: Negative for congestion, postnasal drip, rhinorrhea, sinus pressure, sinus pain, sneezing and sore throat  Eyes: Negative for visual disturbance  Respiratory: Negative for apnea, cough, choking, chest tightness, shortness of breath and wheezing  Cardiovascular: Negative for chest pain, palpitations and leg swelling  Gastrointestinal: Negative for abdominal distention, abdominal pain, anal bleeding, blood in stool, constipation, diarrhea, nausea and vomiting  Endocrine: Negative for cold intolerance and heat intolerance  Genitourinary: Negative for difficulty urinating, dysuria and hematuria  Musculoskeletal: Negative  Skin: Negative  Neurological: Negative for dizziness, tingling, weakness, light-headedness, numbness and headaches  Hematological: Negative for adenopathy  Psychiatric/Behavioral: Negative for agitation, sleep disturbance and suicidal ideas  All other systems reviewed and are negative          Past Medical History:   Diagnosis Date    Anemia     iron def    Bulla of lung ([address] )     Coccydynia     Family history of vitamin B12 deficiency     Fibroid, uterine     Hematuria     last assessed: 12/11/2013    History of hypertension     History of morbid obesity     History of vitamin B deficiency     History of vitamin D deficiency     Hypertension     Hypocalcemia     Hypocalcemia     Iron deficiency     Iron deficiency     Kyphosis deformity of spine     Menopause     Migraine     Morbid (severe) obesity due to excess calories ([address] )     per [employer]-last assessed: 4/11/2014    Narrow angle glaucoma suspect of both eyes     PONV (postoperative nausea and vomiting)     nausea only    Postgastrectomy malabsorption     Vitamin A deficiency     last assessed: 11/11/2014         Current Outpatient Medications:     acetaminophen (TYLENOL) 500 mg tablet, Take 500 mg by mouth every 6 (six) hours as needed for mild pain, Disp: , Rfl:     Ascorbic Acid (CVS VITAMIN C) 500 MG CHEW, Chew 1 tablet 2 (two) times a day, Disp: , Rfl:     calcium carbonate-vitamin D (OSCAL-D) 500 mg-200 units per tablet, Take 1 tablet by mouth 2 (two) times a day with meals, Disp: , Rfl:     Cholecalciferol (CVS VIT D 5000 HIGH-POTENCY PO), Take by mouth, Disp: , Rfl:     cyanocobalamin (VITAMIN B-12) 500 mcg tablet, Take 500 mcg by mouth daily, Disp: , Rfl:     ferrous sulfate (CVS IRON) 325 (65 Fe) mg tablet, Take 325 mg by mouth 2 (two) times a day, Disp: , Rfl:     multivitamin (THERAGRAN) TABS, Take 1 tablet by mouth 2 (two) times a day, Disp: , Rfl:     vitamin A 3 MG (18615 UT) capsule, Take 10,000 Units by mouth daily, Disp: , Rfl:     butalbital-acetaminophen-caffeine (FIORICET,ESGIC) -40 mg per tablet, Take 1 tablet by mouth every 4 (four) hours as needed for headaches (Patient not taking: Reported on 9/29/2020), Disp: 30 tablet, Rfl: 0    Allergies   Allergen Reactions    Pollen Extract Allergic Rhinitis    Suture      Red and break open       Social History   Past Surgical History:   Procedure Laterality Date    APPENDECTOMY      AUGMENTATION MAMMAPLASTY Bilateral 12/26/2018    BRAIN SURGERY  1990    AVM    BREAST BIOPSY Left 10/12/218    x2    BUNIONECTOMY Right 10/2/2019    Procedure: EXOSTECTOMY RIGHT FOOT;  Surgeon: Judith Silva DPM;  Location: [address] [place] [address];  Service: Podiatry   [facility]      tummy tuck,breast implants and lift    ENDOMETRIAL ABLATION  08/2018    UT LAP, SUPRACERVIAL HYSTERECTOMY W/ TUBE&OV, <250G N/A 9/16/2019    Procedure: HYSTERECTOMY LAPAROSCOPIC SUPRACERVICAL ([facility])  WITH B/L SALPINGECTOMY   EXCISION OF ENDOMETRIAL CYST;  Surgeon: Bird Manjarrez DO;  Location[facility];  Service: Gynecology    REFRACTIVE SURGERY      glaucoma    HITESH-EN-Y PROCEDURE  2014    TONSILLECTOMY AND ADENOIDECTOMY      including adenoids    TOOTH EXTRACTION      TUBAL LIGATION      US GUIDANCE BREAST BIOPSY LEFT EACH ADDITIONAL Left 10/12/2018    US GUIDED BREAST BIOPSY LEFT COMPLETE Left 10/12/2018     Family History   Problem Relation Age of Onset    Hypertension Mother     Atrial fibrillation Mother     Melanoma Mother     Cancer Mother         on left upper chest    Graves' disease Mother     Hypertension Father     Atrial fibrillation Father     Diabetes Father     Obesity Father     Allergies Father     Heart disease Father     Stomach cancer Maternal Aunt     Other Maternal Aunt         GIST, malignant-per [employer]    Stomach cancer Paternal Aunt     No Known Problems Maternal Grandmother     Cancer Maternal Grandfather         Lung    Stroke Paternal Grandmother     Cancer Paternal Grandfather         Lung    No Known Problems Daughter     No Known Problems Paternal Aunt     No Known Problems Paternal Aunt     No Known Problems Paternal Aunt     No Known Problems Paternal Aunt     Depression Neg Hx     Anxiety disorder Neg Hx     Drug abuse Neg Hx        Objective:  /74 (BP Location: Left arm, Patient Position: Sitting, Cuff Size: Standard)   Pulse 78   Temp 100 1 °F (37 8 °C) (Tympanic)   Resp 18   Ht 5' 7" (1 702 m)   Wt 69 7 kg (153 lb 9 6 oz)   LMP 09/09/2019   SpO2 97%   BMI 24 06 kg/m²     No results found for this or any previous visit (from the past 1344 hour(s))  Physical Exam  Vitals signs and nursing note reviewed  Constitutional:       General: She is not in acute distress  Appearance: She is well-developed  She is not diaphoretic  HENT:      Head: Normocephalic and atraumatic  Eyes:      General:         Right eye: No discharge  Left eye: No discharge  Conjunctiva/sclera: Conjunctivae normal       Pupils: Pupils are equal, round, and reactive to light  Neck:      Musculoskeletal: Normal range of motion and neck supple  Thyroid: No thyromegaly  Vascular: No JVD  Cardiovascular:      Rate and Rhythm: Normal rate and regular rhythm  Heart sounds: Normal heart sounds  No murmur  No friction rub  No gallop  Pulmonary:      Effort: Pulmonary effort is normal  No respiratory distress  Breath sounds: Normal breath sounds  No wheezing or rales  Chest:      Chest wall: No tenderness  Abdominal:      General: There is no distension  Palpations: Abdomen is soft  Tenderness: There is no abdominal tenderness  Musculoskeletal: Normal range of motion  General: No deformity  Right knee: Tenderness found  Medial joint line tenderness noted  Lymphadenopathy:      Cervical: No cervical adenopathy  Skin:     General: Skin is warm and dry  Coloration: Skin is not pale  Findings: No erythema or rash  Neurological:      Mental Status: She is alert and oriented to person, place, and time  Cranial Nerves: No cranial nerve deficit  Coordination: Coordination normal    Psychiatric:         Behavior: Behavior normal          Thought Content:  Thought content normal          Judgment: Judgment normal

## 2021-02-03 NOTE — ASSESSMENT & PLAN NOTE
Continue Tylenol as needed for pain  Recommend she start using a topical NSAID such as Voltaren gel  Continue stretching exercises  Will order an x-ray for further evaluation  She would like to defer on steroid therapy at this time

## 2021-03-02 ENCOUNTER — OFFICE VISIT (OUTPATIENT)
Dept: INTERNAL MEDICINE CLINIC | Facility: CLINIC | Age: 46
End: 2021-03-02
Payer: COMMERCIAL

## 2021-03-02 VITALS
HEART RATE: 85 BPM | OXYGEN SATURATION: 97 % | BODY MASS INDEX: 24.64 KG/M2 | WEIGHT: 157 LBS | TEMPERATURE: 97.5 F | DIASTOLIC BLOOD PRESSURE: 75 MMHG | HEIGHT: 67 IN | SYSTOLIC BLOOD PRESSURE: 120 MMHG

## 2021-03-02 DIAGNOSIS — M25.561 ACUTE PAIN OF RIGHT KNEE: Primary | ICD-10-CM

## 2021-03-02 PROCEDURE — 99213 OFFICE O/P EST LOW 20 MIN: CPT | Performed by: INTERNAL MEDICINE

## 2021-03-02 NOTE — PROGRESS NOTES
Assessment/Plan:    Acute pain of right knee  Will order an MRI of the right knee for further evaluation refer to Orthopedic surgery  Symptoms are not improved following range-of-motion exercises and use of Tylenol/NSAIDs  Diagnoses and all orders for this visit:    Acute pain of right knee  -     Ambulatory referral to Orthopedic Surgery; Future  -     MRI knee right  wo contrast; Future                  Subjective:      Patient ID: Lizbet Montgomery is a 39 y o  female  Chief Complaint   Patient presents with    Knee Pain     C/O continued right knee pain        57-year-old female is seen today with persistent right knee pain  Pain is localized to right medial knee  She denies any known etiology of pain  Pain has been persistent despite range of motion/stretching exercises and Tylenol/NSAIDs  X-ray showed mild medial compartment narrowing  The following portions of the patient's history were reviewed and updated as appropriate: allergies, current medications, past family history, past medical history, past social history, past surgical history and problem list     Review of Systems   Constitutional: Negative for activity change, appetite change, chills, diaphoresis, fatigue and fever  HENT: Negative for congestion, postnasal drip, rhinorrhea, sinus pressure, sinus pain, sneezing and sore throat  Eyes: Negative for visual disturbance  Respiratory: Negative for apnea, cough, choking, chest tightness, shortness of breath and wheezing  Cardiovascular: Negative for chest pain, palpitations and leg swelling  Gastrointestinal: Negative for abdominal distention, abdominal pain, anal bleeding, blood in stool, constipation, diarrhea, nausea and vomiting  Endocrine: Negative for cold intolerance and heat intolerance  Genitourinary: Negative for difficulty urinating, dysuria and hematuria  Musculoskeletal: Negative  Skin: Negative      Neurological: Negative for dizziness, weakness, light-headedness, numbness and headaches  Hematological: Negative for adenopathy  Psychiatric/Behavioral: Negative for agitation, sleep disturbance and suicidal ideas  All other systems reviewed and are negative          Past Medical History:   Diagnosis Date    Anemia     iron def    Bulla of lung ([address] )     Coccydynia     Family history of vitamin B12 deficiency     Fibroid, uterine     Hematuria     last assessed: 12/11/2013    History of hypertension     History of morbid obesity     History of vitamin B deficiency     History of vitamin D deficiency     Hypertension     Hypocalcemia     Hypocalcemia     Iron deficiency     Iron deficiency     Kyphosis deformity of spine     Menopause     Migraine     Morbid (severe) obesity due to excess calories ([address] )     per [employer]-last assessed: 4/11/2014    Narrow angle glaucoma suspect of both eyes     PONV (postoperative nausea and vomiting)     nausea only    Postgastrectomy malabsorption     Vitamin A deficiency     last assessed: 11/11/2014         Current Outpatient Medications:     acetaminophen (TYLENOL) 500 mg tablet, Take 500 mg by mouth every 6 (six) hours as needed for mild pain, Disp: , Rfl:     Ascorbic Acid (CVS VITAMIN C) 500 MG CHEW, Chew 1 tablet 2 (two) times a day, Disp: , Rfl:     butalbital-acetaminophen-caffeine (FIORICET,ESGIC) -40 mg per tablet, Take 1 tablet by mouth every 4 (four) hours as needed for headaches, Disp: 30 tablet, Rfl: 0    calcium carbonate-vitamin D (OSCAL-D) 500 mg-200 units per tablet, Take 1 tablet by mouth 2 (two) times a day with meals, Disp: , Rfl:     Cholecalciferol (CVS VIT D 5000 HIGH-POTENCY PO), Take by mouth, Disp: , Rfl:     cyanocobalamin (VITAMIN B-12) 500 mcg tablet, Take 500 mcg by mouth daily, Disp: , Rfl:     ferrous sulfate (CVS IRON) 325 (65 Fe) mg tablet, Take 325 mg by mouth 2 (two) times a day, Disp: , Rfl:     multivitamin (THERAGRAN) TABS, Take 1 tablet by mouth 2 (two) times a day, Disp: , Rfl:     vitamin A 3 MG (60496 UT) capsule, Take 10,000 Units by mouth daily, Disp: , Rfl:     Allergies   Allergen Reactions    Pollen Extract Allergic Rhinitis    Suture      Red and break open       Social History   Past Surgical History:   Procedure Laterality Date    APPENDECTOMY      AUGMENTATION MAMMAPLASTY Bilateral 12/26/2018    BRAIN SURGERY  1990    AVM    BREAST BIOPSY Left 10/12/218    x2    BUNIONECTOMY Right 10/2/2019    Procedure: EXOSTECTOMY RIGHT FOOT;  Surgeon: Quentin Holloway DPM;  Location: [address] [place] [address] MAIN OR;  Service: [place] [id number]      tummy tuck,breast implants and lift    ENDOMETRIAL ABLATION  08/2018    PA LAP, SUPRACERVIAL HYSTERECTOMY W/ TUBE&OV, <250G N/A 9/16/2019    Procedure: HYSTERECTOMY LAPAROSCOPIC SUPRACERVICAL ([address] [place] [address])  WITH B/L SALPINGECTOMY   EXCISION OF ENDOMETRIAL CYST;  Surgeon: Melony Eden DO;  Location[facility];  Service: Gynecology    REFRACTIVE SURGERY      glaucoma    HITESH-EN-Y PROCEDURE  2014    TONSILLECTOMY AND ADENOIDECTOMY      including adenoids    TOOTH EXTRACTION      TUBAL LIGATION      US GUIDANCE BREAST BIOPSY LEFT EACH ADDITIONAL Left 10/12/2018    US GUIDED BREAST BIOPSY LEFT COMPLETE Left 10/12/2018     Family History   Problem Relation Age of Onset    Hypertension Mother     Atrial fibrillation Mother     Melanoma Mother     Cancer Mother         on left upper chest    Graves' disease Mother     Hypertension Father     Atrial fibrillation Father     Diabetes Father     Obesity Father     Allergies Father     Heart disease Father     Stomach cancer Maternal Aunt     Other Maternal Aunt         GIST, malignant-per [employer]scripts    Stomach cancer Paternal Aunt     No Known Problems Maternal Grandmother     Cancer Maternal Grandfather         Lung    Stroke Paternal Grandmother     Cancer Paternal Grandfather         Lung    No Known Problems Daughter  No Known Problems Paternal Aunt     No Known Problems Paternal Aunt     No Known Problems Paternal Aunt     No Known Problems Paternal Aunt     Depression Neg Hx     Anxiety disorder Neg Hx     Drug abuse Neg Hx        Objective:  /75 (BP Location: Left arm, Patient Position: Sitting, Cuff Size: Adult)   Pulse 85   Temp 97 5 °F (36 4 °C)   Ht 5' 7" (1 702 m)   Wt 71 2 kg (157 lb)   LMP 09/09/2019   SpO2 97%   BMI 24 59 kg/m²     No results found for this or any previous visit (from the past 1344 hour(s))  Physical Exam  Vitals signs and nursing note reviewed  Constitutional:       General: She is not in acute distress  Appearance: She is well-developed  She is not diaphoretic  HENT:      Head: Normocephalic and atraumatic  Eyes:      General:         Right eye: No discharge  Left eye: No discharge  Conjunctiva/sclera: Conjunctivae normal       Pupils: Pupils are equal, round, and reactive to light  Neck:      Musculoskeletal: Normal range of motion and neck supple  Thyroid: No thyromegaly  Vascular: No JVD  Cardiovascular:      Rate and Rhythm: Normal rate and regular rhythm  Heart sounds: Normal heart sounds  No murmur  No friction rub  No gallop  Pulmonary:      Effort: Pulmonary effort is normal  No respiratory distress  Breath sounds: Normal breath sounds  No wheezing or rales  Chest:      Chest wall: No tenderness  Abdominal:      General: There is no distension  Palpations: Abdomen is soft  Tenderness: There is no abdominal tenderness  Musculoskeletal: Normal range of motion  General: No deformity  Right knee: Tenderness found  Medial joint line tenderness noted  Lymphadenopathy:      Cervical: No cervical adenopathy  Skin:     General: Skin is warm and dry  Coloration: Skin is not pale  Findings: No erythema or rash     Neurological:      Mental Status: She is alert and oriented to person, place, and time  Cranial Nerves: No cranial nerve deficit  Coordination: Coordination normal    Psychiatric:         Behavior: Behavior normal          Thought Content:  Thought content normal          Judgment: Judgment normal

## 2021-03-02 NOTE — ASSESSMENT & PLAN NOTE
Will order an MRI of the right knee for further evaluation refer to Orthopedic surgery  Symptoms are not improved following range-of-motion exercises and use of Tylenol/NSAIDs

## 2021-03-08 ENCOUNTER — TELEPHONE (OUTPATIENT)
Dept: INTERNAL MEDICINE CLINIC | Facility: CLINIC | Age: 46
End: 2021-03-08

## 2021-03-08 NOTE — TELEPHONE ENCOUNTER
Patient is scheduled to go for her MRI of the knee on Thursday 03/11/2021  The insurance is pending review, but they don't let me send records to them only click and document what they patient is having and other stuff she has done      They state that if the Provider would like to speak with them to give them more information to give them, call them at [phone]    They will ask for the member # [id number]     Please can you call them tomorrow to see what they outcome will be so we can get this approved or not before her appointment on Thursday

## 2021-03-11 ENCOUNTER — HOSPITAL ENCOUNTER (OUTPATIENT)
Dept: MRI IMAGING | Facility: HOSPITAL | Age: 46
Discharge: HOME | End: 2021-03-11
Attending: INTERNAL MEDICINE

## 2021-03-11 DIAGNOSIS — M25.561 ACUTE PAIN OF RIGHT KNEE: ICD-10-CM

## 2021-03-13 ENCOUNTER — HOSPITAL ENCOUNTER (OUTPATIENT)
Dept: MRI IMAGING | Facility: HOSPITAL | Age: 46
Discharge: HOME | End: 2021-03-13
Payer: COMMERCIAL

## 2021-03-13 PROCEDURE — 73721 MRI JNT OF LWR EXTRE W/O DYE: CPT

## 2021-03-13 PROCEDURE — G1004 CDSM NDSC: HCPCS

## 2021-03-31 ENCOUNTER — OFFICE VISIT (OUTPATIENT)
Dept: OBGYN CLINIC | Facility: HOSPITAL | Age: 46
End: 2021-03-31
Payer: COMMERCIAL

## 2021-03-31 VITALS
HEIGHT: 67 IN | SYSTOLIC BLOOD PRESSURE: 102 MMHG | DIASTOLIC BLOOD PRESSURE: 72 MMHG | WEIGHT: 156 LBS | BODY MASS INDEX: 24.48 KG/M2 | HEART RATE: 90 BPM

## 2021-03-31 DIAGNOSIS — M17.11 PATELLOFEMORAL ARTHRITIS OF RIGHT KNEE: ICD-10-CM

## 2021-03-31 DIAGNOSIS — M25.561 ACUTE PAIN OF RIGHT KNEE: Primary | ICD-10-CM

## 2021-03-31 DIAGNOSIS — M25.561 ACUTE PAIN OF RIGHT KNEE: ICD-10-CM

## 2021-03-31 DIAGNOSIS — S83.271A COMPLEX TEAR OF LATERAL MENISCUS OF RIGHT KNEE, UNSPECIFIED WHETHER OLD OR CURRENT TEAR, INITIAL ENCOUNTER: Primary | ICD-10-CM

## 2021-03-31 DIAGNOSIS — S83.271A COMPLEX TEAR OF LATERAL MENISCUS OF RIGHT KNEE, UNSPECIFIED WHETHER OLD OR CURRENT TEAR, INITIAL ENCOUNTER: ICD-10-CM

## 2021-03-31 PROCEDURE — 1036F TOBACCO NON-USER: CPT | Performed by: ORTHOPAEDIC SURGERY

## 2021-03-31 PROCEDURE — 20610 DRAIN/INJ JOINT/BURSA W/O US: CPT | Performed by: ORTHOPAEDIC SURGERY

## 2021-03-31 PROCEDURE — 3008F BODY MASS INDEX DOCD: CPT | Performed by: ORTHOPAEDIC SURGERY

## 2021-03-31 PROCEDURE — 99203 OFFICE O/P NEW LOW 30 MIN: CPT | Performed by: ORTHOPAEDIC SURGERY

## 2021-03-31 RX ORDER — BETAMETHASONE SODIUM PHOSPHATE AND BETAMETHASONE ACETATE 3; 3 MG/ML; MG/ML
12 INJECTION, SUSPENSION INTRA-ARTICULAR; INTRALESIONAL; INTRAMUSCULAR; SOFT TISSUE
Status: COMPLETED | OUTPATIENT
Start: 2021-03-31 | End: 2021-03-31

## 2021-03-31 RX ORDER — BUPIVACAINE HYDROCHLORIDE 2.5 MG/ML
2 INJECTION, SOLUTION INFILTRATION; PERINEURAL
Status: COMPLETED | OUTPATIENT
Start: 2021-03-31 | End: 2021-03-31

## 2021-03-31 RX ORDER — LIDOCAINE HYDROCHLORIDE 10 MG/ML
2 INJECTION, SOLUTION INFILTRATION; PERINEURAL
Status: COMPLETED | OUTPATIENT
Start: 2021-03-31 | End: 2021-03-31

## 2021-03-31 RX ADMIN — BUPIVACAINE HYDROCHLORIDE 2 ML: 2.5 INJECTION, SOLUTION INFILTRATION; PERINEURAL at 16:10

## 2021-03-31 RX ADMIN — BETAMETHASONE SODIUM PHOSPHATE AND BETAMETHASONE ACETATE 12 MG: 3; 3 INJECTION, SUSPENSION INTRA-ARTICULAR; INTRALESIONAL; INTRAMUSCULAR; SOFT TISSUE at 16:10

## 2021-03-31 RX ADMIN — LIDOCAINE HYDROCHLORIDE 2 ML: 10 INJECTION, SOLUTION INFILTRATION; PERINEURAL at 16:10

## 2021-03-31 NOTE — PROGRESS NOTES
Assessment:  1  Complex tear of lateral meniscus of right knee, unspecified whether old or current tear, initial encounter     2  Acute pain of right knee  Ambulatory referral to Orthopedic Surgery   3  Patellofemoral arthritis of right knee         Plan:  The patient has suspect anterolateral meniscus tear yet has not tried any treatment thus far  Surgery is not currently recommended  The patient was provided with right knee steroid injection  The patient tolerated the procedure well  The patient should follow up in 8 weeks  To do next visit:  Return in about 8 weeks (around 5/26/2021)  The above stated was discussed in layman's terms and the patient expressed understanding  All questions were answered to the patient's satisfaction  Scribe Attestation    I,:  Ramón Bartlett am acting as a scribe while in the presence of the attending physician :       I,:  Aly Zabala MD personally performed the services described in this documentation    as scribed in my presence :             Subjective:   Haydee Zepeda is a 39 y o  female who presents for initial evaluation of right knee  She has had symptoms for several months with no injury  Today she complains of right medial knee pain  Deep flexion aggravates while rest alleviates  The knee can click on occasion  She rates her pain at 5/10 and 8/10 at its worse  She has used Tylenol with some benefit  She avoids NSAIDs due to history of gastric surgery  She does use ice with some benefit  She denies past injections, physical therapy or surgery of this knee            Review of systems negative unless otherwise specified in HPI    Past Medical History:   Diagnosis Date    Anemia     iron def    Bulla of lung ([address] )     Coccydynia     Family history of vitamin B12 deficiency     Fibroid, uterine     Hematuria     last assessed: 12/11/2013    History of hypertension     History of morbid obesity     History of vitamin B deficiency     History of vitamin D deficiency     Hypertension     Hypocalcemia     Hypocalcemia     Iron deficiency     Iron deficiency     Kyphosis deformity of spine     Menopause     Migraine     Morbid (severe) obesity due to excess calories ([address] )     per [employer]-last assessed: 4/11/2014    Narrow angle glaucoma suspect of both eyes     PONV (postoperative nausea and vomiting)     nausea only    Postgastrectomy malabsorption     Vitamin A deficiency     last assessed: 11/11/2014       Past Surgical History:   Procedure Laterality Date    APPENDECTOMY      AUGMENTATION MAMMAPLASTY Bilateral 12/26/2018    BRAIN SURGERY  1990    AVM    BREAST BIOPSY Left 10/12/218    x2    BUNIONECTOMY Right 10/2/2019    Procedure: EXOSTECTOMY RIGHT FOOT;  Surgeon: Chilo Gomez DPM;  Location: [address] [place] [address] OR;  Service: Podiatry   [place] [address]      tummy tuck,breast implants and lift    ENDOMETRIAL ABLATION  08/2018    AR LAP, SUPRACERVIAL HYSTERECTOMY W/ TUBE&OV, <250G N/A 9/16/2019    Procedure: HYSTERECTOMY LAPAROSCOPIC SUPRACERVICAL ([address] [place] [address])  WITH B/L SALPINGECTOMY   EXCISION OF ENDOMETRIAL CYST;  Surgeon: Joel Shah DO;  Location: [facility];  Service: Gynecology    REFRACTIVE SURGERY      glaucoma    HITESH-EN-Y PROCEDURE  2014    TONSILLECTOMY AND ADENOIDECTOMY      including adenoids    TOOTH EXTRACTION      TUBAL LIGATION      US GUIDANCE BREAST BIOPSY LEFT EACH ADDITIONAL Left 10/12/2018    US GUIDED BREAST BIOPSY LEFT COMPLETE Left 10/12/2018       Family History   Problem Relation Age of Onset    Hypertension Mother     Atrial fibrillation Mother     Melanoma Mother     Cancer Mother         on left upper chest    Graves' disease Mother     Hypertension Father     Atrial fibrillation Father     Diabetes Father     Obesity Father     Allergies Father     Heart disease Father     Stomach cancer Maternal Aunt     Other Maternal Aunt         GIST, malignant-per [employer]    Stomach cancer Paternal Aunt     No Known Problems Maternal Grandmother     Cancer Maternal Grandfather         Lung    Stroke Paternal Grandmother     Cancer Paternal Grandfather         Lung    No Known Problems Daughter     No Known Problems Paternal Aunt     No Known Problems Paternal Aunt     No Known Problems Paternal Aunt     No Known Problems Paternal Aunt     Depression Neg Hx     Anxiety disorder Neg Hx     Drug abuse Neg Hx        Social History     Occupational History    Not on file   Tobacco Use    Smoking status: Never Smoker    Smokeless tobacco: Never Used   Substance and Sexual Activity    Alcohol use:  Yes     Alcohol/week: 1 0 standard drinks     Types: 1 Standard drinks or equivalent per week     Frequency: Monthly or less     Drinks per session: 1 or 2     Binge frequency: Never     Comment: social    Drug use: No    Sexual activity: Yes     Comment: hysterectomy         Current Outpatient Medications:     acetaminophen (TYLENOL) 500 mg tablet, Take 500 mg by mouth every 6 (six) hours as needed for mild pain, Disp: , Rfl:     Ascorbic Acid (CVS VITAMIN C) 500 MG CHEW, Chew 1 tablet 2 (two) times a day, Disp: , Rfl:     butalbital-acetaminophen-caffeine (FIORICET,ESGIC) -40 mg per tablet, Take 1 tablet by mouth every 4 (four) hours as needed for headaches, Disp: 30 tablet, Rfl: 0    calcium carbonate-vitamin D (OSCAL-D) 500 mg-200 units per tablet, Take 1 tablet by mouth 2 (two) times a day with meals, Disp: , Rfl:     Cholecalciferol (CVS VIT D 5000 HIGH-POTENCY PO), Take by mouth, Disp: , Rfl:     cyanocobalamin (VITAMIN B-12) 500 mcg tablet, Take 500 mcg by mouth daily, Disp: , Rfl:     ferrous sulfate (CVS IRON) 325 (65 Fe) mg tablet, Take 325 mg by mouth 2 (two) times a day, Disp: , Rfl:     multivitamin (THERAGRAN) TABS, Take 1 tablet by mouth 2 (two) times a day, Disp: , Rfl:     vitamin A 3 MG (62194 UT) capsule, Take 10,000 Units by mouth daily, Disp: , Rfl:     Allergies   Allergen Reactions    Pollen Extract Allergic Rhinitis    Suture      Red and break open            Vitals:    03/31/21 1509   BP: 102/72   Pulse: 90       Objective:  Physical exam  · General: Awake, Alert, Oriented  · Eyes: Pupils equal, round and reactive to light  · Heart: regular rate and rhythm  · Lungs: No audible wheezing  · Abdomen: soft                    Ortho Exam  Right knee:  TTp medial joint line  Mild varus alignment   No erythema or ecchymosis  No effusion or swelling  Normal strength  Good ROM with crepitus   Positive Medial McMurrays  Positive Apleys  Calf compartments soft and supple  Sensation intact  Toes are warm sensate and mobile        Diagnostics, reviewed and taken today if performed as documented: The attending physician has personally reviewed the pertinent films in PACS and interpretation is as follows:  Right knee MRI:  Suspected lateral meniscus tear with patella cartilage loss  Right knee x-ray: Moderate patellofemoral and mild medial compartment nt arthritic changes  Procedures, if performed today:    Large joint arthrocentesis: R knee  Universal Protocol:  Consent: Verbal consent obtained  Risks and benefits: risks, benefits and alternatives were discussed  Consent given by: patient  Time out: Immediately prior to procedure a "time out" was called to verify the correct patient, procedure, equipment, support staff and site/side marked as required    Timeout called at: 3/31/2021 4:07 PM   Patient understanding: patient states understanding of the procedure being performed  Site marked: the operative site was marked  Patient identity confirmed: verbally with patient    Supporting Documentation  Indications: pain   Procedure Details  Location: knee - R knee  Preparation: Patient was prepped and draped in the usual sterile fashion  Needle size: 22 G  Ultrasound guidance: no  Approach: anterolateral  Medications administered: 12 mg betamethasone acetate-betamethasone sodium phosphate 6 (3-3) mg/mL; 2 mL bupivacaine 0 25 %; 2 mL lidocaine 1 %    Patient tolerance: patient tolerated the procedure well with no immediate complications  Dressing:  Sterile dressing applied             Portions of the record may have been created with voice recognition software  Occasional wrong word or "sound a like" substitutions may have occurred due to the inherent limitations of voice recognition software  Read the chart carefully and recognize, using context, where substitutions have occurred

## 2021-04-01 DIAGNOSIS — Z23 ENCOUNTER FOR IMMUNIZATION: ICD-10-CM

## 2021-04-05 NOTE — PROGRESS NOTES
PT Evaluation     Today's date: 2021  Patient name: Xin Palmer  : 1975  MRN: [id number]  Referring provider: Reid Centeno MD  Dx:   Encounter Diagnosis     ICD-10-CM    1  Oth tear of medial meniscus, current injury, r knee, subs  S83 241D    2  Acute pain of right knee  M25 561    3  Patellofemoral disorder of right knee  M22 2X1                   Assessment  Assessment details: Flavia Lozoya is a 39 y o  female who presents with increased R knee pain consistent with referring diagnosis of R medial meniscus tear that is moderately complex due to prior failed treatment, high level of function and fear avoidance  Clinically demonstrates decreased R knee ROM, strength and instability as well as limited proprioception and increased effusion leading to antalgic gait patterns and poor tolerance to exercise and ADLs despite (-) thessaly testing  This suggests the need for skilled OPPT to address the above listed impairments, achieve established goals and return to PLOF pain-free     Impairments: abnormal coordination, abnormal gait, abnormal muscle tone, abnormal or restricted ROM, abnormal movement, activity intolerance, impaired balance, impaired physical strength, pain with function and safety issue    Symptom irritability: moderateBarriers to therapy: Fear avoidance, increased effusion, high level of activity   Understanding of Dx/Px/POC: goodPrognosis details: Medial knee pain worse with deep squatting dynamic valgus positioning     Goals  ST weeks  1 ) [place] with HEP  2 ) Decrease R knee effusion at least  5 to 1 cm  3 ) Improve R knee ROM to [facility] into flex/extension    LT weeks  1 ) Decrease pain 0-2/10 at rest and with activity  2 ) Improved R knee strength to 5/5 into flex and extension  3 ) Able to perform a lateral step down with score less than 2/6  4 ) Improve FOTO score at least to 80 showing improved self reported disability     Plan  Plan details: Initiate POC for R knee stability and pain control, improve quad strength and progress to function return to activity and sports  Patient would benefit from: PT eval and skilled physical therapy  Planned modality interventions: cryotherapy, TENS, manual electrical stimulation and electrical stimulation/Russian stimulation  Planned therapy interventions: ADL retraining, ADL training, activity modification, balance, balance/weight bearing training, behavior modification, body mechanics training, flexibility, functional ROM exercises, gait training, graded activity, graded motor, therapeutic activities, therapeutic exercise, therapeutic training, postural training, patient education, neuromuscular re-education, muscle pump exercises, manual therapy, joint mobilization, IADL retraining, strengthening and stretching  Frequency: 2x week  Duration in visits: 12  Duration in weeks: 6  Plan of Care beginning date: 2021  Plan of Care expiration date: 2021  Treatment plan discussed with: patient        Subjective Evaluation    History of Present Illness  Date of onset: 3/10/2021  Mechanism of injury: Venancio Galdamez is a 39 y o  female who presents with increased R knee pain and burning starting ~ 2 month ago with VICKIE  Decided to seek out MD Dr Jillian Jaimes where cortisone injection medially was provided and referral for MRI was provided with contrast- + for suspect medial R meniscal tear  MD suggest PT to address strength in order to reduce patellar instability  Notes most pain with kneeling fully on R knee and pivoting  Has returned to the gym, tried squatting and jumping jacks but had continued pain  Denies any numbness or tingling or change in bowel or bladder function  Wishes to return to pain-free ADLs and exercise at [employer] and strengthen and get out of pain  F/U with Dr Jillian Jaimes 21               Not a recurrent problem   Quality of life: excellent    Pain  Current pain ratin  At best pain ratin  At worst pain ratin  Location: R knee  Quality: burning, sharp, tight and pressure  Relieving factors: ice, rest, support, relaxation and medications  Aggravating factors: walking, stair climbing, running and lifting  Progression: worsening    Social Support  Steps to enter house: yes  5  Stairs in house: yes   10  Lives in: multiple-level home  Lives with: spouse    Employment status: working  Hand dominance: right  Exercise history: 3 days a week      Diagnostic Tests  MRI studies: abnormal (+ R medial menisus tear and cyst)  Treatments  Previous treatment: medication  Current treatment: injection treatment and physical therapy  Patient Goals  Patient goals for therapy: decreased edema, decreased pain, improved balance, increased motion, increased strength, independence with ADLs/IADLs, return to sport/leisure activities and return to work  Patient goal: Dance         Objective     Static Posture   General Observations  Asymmetrical weight bearing       Active Range of Motion   Left Knee   Hyperextension  Flexion: 145 degrees   Extension: -7 degrees   Extensor la degrees     Right Knee   Hyperextension   Flexion: 143 degrees   Extension: -7 degrees   Extensor la degrees     Additional Active Range of Motion Details  Little discomfort with R knee ROM    Mobility   Patellar Mobility:   Left Knee   Hypermobile: left medial, left lateral, left superior and left inferior      Right Knee   Hypermobile: medial, lateral, superior and inferior     Additional Mobility Details  No pain with patellar mobilization     Patellar Static Positioning   Left Knee: lateral tilt  Right Knee: lateral tilt    Strength/Myotome Testing     Left Hip   Planes of Motion   Flexion: 4+  Abduction: 4+  Adduction: 5  External rotation: 4+  Internal rotation: 5    Right Hip   Planes of Motion   Flexion: 4-  Abduction: 4-  Adduction: 5  External rotation: 4  Internal rotation: 4+    Left Knee   Flexion: 5  Extension: 5    Right Knee   Flexion: 4+  Extension: 4+    Tests Right Knee   Positive medial Trudy and valgus stress test at 30 degrees  Negative lateral Trudy, patellar apprehension, Thessaly's test at 5 degrees, Thessaly's test at 20 degrees and valgus stress test at 0 degrees       Swelling     Left Knee Girth Measurement (cm)   Joint line: 35 cm    Right Knee Girth Measurement (cm)   Joint line: 36 cm             Precautions: Prior Bariatric surgery      Manuals 4/6            STM to R medial meniscus                                                    Neuro Re-Ed             SLS NV            Foam SLS NV            Rebounder SLS and tandem NV                                                                Ther Ex             SLR  10x            SL hip ABD 10x            Hip ADD 10x            Chair RDL 10x            Lat step down 10x            Leg press NV            TBand hip 3 way NV                         Ther Activity             Bike             EFX             Gait Training                                       Modalities

## 2021-04-06 ENCOUNTER — EVALUATION (OUTPATIENT)
Dept: PHYSICAL THERAPY | Facility: CLINIC | Age: 46
End: 2021-04-06
Payer: COMMERCIAL

## 2021-04-06 DIAGNOSIS — M22.2X1 PATELLOFEMORAL DISORDER OF RIGHT KNEE: ICD-10-CM

## 2021-04-06 DIAGNOSIS — M25.561 ACUTE PAIN OF RIGHT KNEE: Primary | ICD-10-CM

## 2021-04-06 DIAGNOSIS — S83.271A COMPLEX TEAR OF LATERAL MENISCUS OF RIGHT KNEE, UNSPECIFIED WHETHER OLD OR CURRENT TEAR, INITIAL ENCOUNTER: ICD-10-CM

## 2021-04-06 DIAGNOSIS — M17.11 PATELLOFEMORAL ARTHRITIS OF RIGHT KNEE: ICD-10-CM

## 2021-04-06 PROCEDURE — 97162 PT EVAL MOD COMPLEX 30 MIN: CPT | Performed by: PHYSICAL THERAPIST

## 2021-04-06 PROCEDURE — 97110 THERAPEUTIC EXERCISES: CPT | Performed by: PHYSICAL THERAPIST

## 2021-04-06 NOTE — LETTER
2021    MD Seth Fuller [place] [address]    Patient: Vonnie Ornelas   YOB: 1975   Date of Visit: 2021     Encounter Diagnosis     ICD-10-CM    1  Acute pain of right knee  M25 561 Ambulatory referral to Physical Therapy   2  Complex tear of lateral meniscus of right knee, unspecified whether old or current tear, initial encounter  S83 271A Ambulatory referral to Physical Therapy   3  Patellofemoral arthritis of right knee  M17 11 Ambulatory referral to Physical Therapy   4  Patellofemoral disorder of right knee  M22 2X1        Dear Dr Elidia Dao: Thank you for your recent referral of Vonnie Ornelas  Please review the attached evaluation summary from Valerie's recent visit  Please verify that you agree with the plan of care by signing the attached order  If you have any questions or concerns, please do not hesitate to call  I sincerely appreciate the opportunity to share in the care of one of your patients and hope to have another opportunity to work with you in the near future  Sincerely,    Clyde Del Cid, PT      Referring Provider:      I certify that I have read the below Plan of Care and certify the need for these services furnished under this plan of treatment while under my care  MD Seth Fuller [place] [address]  [address]          PT Evaluation     Today's date: 2021  Patient name: Vonnie Ornelas  : 1975  MRN: [id number]  Referring provider: Sol Prasad MD  Dx:   Encounter Diagnosis     ICD-10-CM    1  Oth tear of medial meniscus, current injury, r knee, subs  S83 241D    2  Acute pain of right knee  M25 561    3   Patellofemoral disorder of right knee  M22 2X1                   Assessment  Assessment details: Ricardo Guillory is a 39 y o  female who presents with increased R knee pain consistent with referring diagnosis of R medial meniscus tear that is moderately complex due to prior failed treatment, high level of function and fear avoidance  Clinically demonstrates decreased R knee ROM, strength and instability as well as limited proprioception and increased effusion leading to antalgic gait patterns and poor tolerance to exercise and ADLs despite (-) thessaly testing  This suggests the need for skilled OPPT to address the above listed impairments, achieve established goals and return to PLOF pain-free     Impairments: abnormal coordination, abnormal gait, abnormal muscle tone, abnormal or restricted ROM, abnormal movement, activity intolerance, impaired balance, impaired physical strength, pain with function and safety issue    Symptom irritability: moderateBarriers to therapy: Fear avoidance, increased effusion, high level of activity   Understanding of Dx/Px/POC: goodPrognosis details: Medial knee pain worse with deep squatting dynamic valgus positioning     Goals  ST weeks  1 ) [place] with HEP  2 ) Decrease R knee effusion at least  5 to 1 cm  3 ) Improve R knee ROM to [facility] into flex/extension    LT weeks  1 ) Decrease pain 0-2/10 at rest and with activity  2 ) Improved R knee strength to 5/5 into flex and extension  3 ) Able to perform a lateral step down with score less than 2/6  4 ) Improve FOTO score at least to 80 showing improved self reported disability     Plan  Plan details: Initiate POC for R knee stability and pain control, improve quad strength and progress to function return to activity and sports  Patient would benefit from: PT eval and skilled physical therapy  Planned modality interventions: cryotherapy, TENS, manual electrical stimulation and electrical stimulation/Russian stimulation  Planned therapy interventions: ADL retraining, ADL training, activity modification, balance, balance/weight bearing training, behavior modification, body mechanics training, flexibility, functional ROM exercises, gait training, graded activity, graded motor, therapeutic activities, therapeutic exercise, therapeutic training, postural training, patient education, neuromuscular re-education, muscle pump exercises, manual therapy, joint mobilization, IADL retraining, strengthening and stretching  Frequency: 2x week  Duration in visits: 12  Duration in weeks: 6  Plan of Care beginning date: 2021  Plan of Care expiration date: 2021  Treatment plan discussed with: patient        Subjective Evaluation    History of Present Illness  Date of onset: 3/10/2021  Mechanism of injury: Valentina Castaneda is a 39 y o  female who presents with increased R knee pain and burning starting ~ 2 month ago with VICKIE  Decided to seek out MD Dr Tabby Sharif where cortisone injection medially was provided and referral for MRI was provided with contrast- + for suspect medial R meniscal tear  MD suggest PT to address strength in order to reduce patellar instability  Notes most pain with kneeling fully on R knee and pivoting  Has returned to the gym, tried squatting and jumping jacks but had continued pain  Denies any numbness or tingling or change in bowel or bladder function  Wishes to return to pain-free ADLs and exercise at [facility] and strengthen and get out of pain  F/U with Dr Tabby Sharif 21               Not a recurrent problem   Quality of life: excellent    Pain  Current pain ratin  At best pain ratin  At worst pain ratin  Location: R knee  Quality: burning, sharp, tight and pressure  Relieving factors: ice, rest, support, relaxation and medications  Aggravating factors: walking, stair climbing, running and lifting  Progression: worsening    Social Support  Steps to enter house: yes  5  Stairs in house: yes   10  Lives in: multiple-level home  Lives with: spouse    Employment status: working  Hand dominance: right  Exercise history: 3 days a week      Diagnostic Tests  MRI studies: abnormal (+ R medial menisus tear and cyst)  Treatments  Previous treatment: medication  Current treatment: injection treatment and physical therapy  Patient Goals  Patient goals for therapy: decreased edema, decreased pain, improved balance, increased motion, increased strength, independence with ADLs/IADLs, return to sport/leisure activities and return to work  Patient goal: Dance         Objective     Static Posture   General Observations  Asymmetrical weight bearing  Active Range of Motion   Left Knee   Hyperextension  Flexion: 145 degrees   Extension: -7 degrees   Extensor la degrees     Right Knee   Hyperextension   Flexion: 143 degrees   Extension: -7 degrees   Extensor la degrees     Additional Active Range of Motion Details  Little discomfort with R knee ROM    Mobility   Patellar Mobility:   Left Knee   Hypermobile: left medial, left lateral, left superior and left inferior      Right Knee   Hypermobile: medial, lateral, superior and inferior     Additional Mobility Details  No pain with patellar mobilization     Patellar Static Positioning   Left Knee: lateral tilt  Right Knee: lateral tilt    Strength/Myotome Testing     Left Hip   Planes of Motion   Flexion: 4+  Abduction: 4+  Adduction: 5  External rotation: 4+  Internal rotation: 5    Right Hip   Planes of Motion   Flexion: 4-  Abduction: 4-  Adduction: 5  External rotation: 4  Internal rotation: 4+    Left Knee   Flexion: 5  Extension: 5    Right Knee   Flexion: 4+  Extension: 4+    Tests     Right Knee   Positive medial Trudy and valgus stress test at 30 degrees  Negative lateral Trudy, patellar apprehension, Thessaly's test at 5 degrees, Thessaly's test at 20 degrees and valgus stress test at 0 degrees       Swelling     Left Knee Girth Measurement (cm)   Joint line: 35 cm    Right Knee Girth Measurement (cm)   Joint line: 36 cm             Precautions: Prior Bariatric surgery      Manuals 4/6            STM to R medial meniscus                                                    Neuro Re-Ed             SLS NV            Foam SLS NV Rebounder SLS and tandem NV                                                                Ther Ex             SLR  10x            SL hip ABD 10x            Hip ADD 10x            Chair RDL 10x            Lat step down 10x            Leg press NV            TBand hip 3 way NV                         Ther Activity             Bike             EFX             Gait Training                                       Modalities

## 2021-04-13 ENCOUNTER — LAB (OUTPATIENT)
Dept: LAB | Facility: HOSPITAL | Age: 46
End: 2021-04-13
Payer: COMMERCIAL

## 2021-04-13 DIAGNOSIS — E50.9 VITAMIN A DEFICIENCY: ICD-10-CM

## 2021-04-13 DIAGNOSIS — K91.2 POSTSURGICAL MALABSORPTION: ICD-10-CM

## 2021-04-13 DIAGNOSIS — Z48.815 ENCOUNTER FOR SURGICAL AFTERCARE FOLLOWING SURGERY OF DIGESTIVE SYSTEM: ICD-10-CM

## 2021-04-13 DIAGNOSIS — Z98.84 BARIATRIC SURGERY STATUS: ICD-10-CM

## 2021-04-13 PROCEDURE — 83883 ASSAY NEPHELOMETRY NOT SPEC: CPT

## 2021-04-22 ENCOUNTER — OFFICE VISIT (OUTPATIENT)
Dept: BARIATRICS | Facility: CLINIC | Age: 46
End: 2021-04-22
Payer: COMMERCIAL

## 2021-04-22 VITALS
HEIGHT: 67 IN | BODY MASS INDEX: 24.25 KG/M2 | WEIGHT: 154.5 LBS | HEART RATE: 77 BPM | SYSTOLIC BLOOD PRESSURE: 118 MMHG | DIASTOLIC BLOOD PRESSURE: 60 MMHG | TEMPERATURE: 98.5 F

## 2021-04-22 DIAGNOSIS — K91.2 POSTSURGICAL MALABSORPTION: Primary | ICD-10-CM

## 2021-04-22 DIAGNOSIS — E61.1 IRON DEFICIENCY: ICD-10-CM

## 2021-04-22 DIAGNOSIS — Z98.84 BARIATRIC SURGERY STATUS: ICD-10-CM

## 2021-04-22 DIAGNOSIS — R53.82 CHRONIC FATIGUE: ICD-10-CM

## 2021-04-22 DIAGNOSIS — Z48.815 ENCOUNTER FOR SURGICAL AFTERCARE FOLLOWING SURGERY OF DIGESTIVE SYSTEM: ICD-10-CM

## 2021-04-22 DIAGNOSIS — R53.83 FATIGUE: ICD-10-CM

## 2021-04-22 LAB — MISCELLANEOUS LAB TEST RESULT: NORMAL

## 2021-04-22 PROCEDURE — 99213 OFFICE O/P EST LOW 20 MIN: CPT | Performed by: PHYSICIAN ASSISTANT

## 2021-04-22 NOTE — PROGRESS NOTES
POST-OP OFFICE VISIT - BARIATRIC SURGERY  Nannette Vallecillo 39 y o  female MRN: [id number]  Unit/Bed#:  Encounter: [id number]      HPI:  Nannette Vallecillo is a 39 y o  female S/P RNYGB with Dr Edmundo Gomes on 3/31/14 with subsequent abdominoplasty and breast augmentation presenting to the office for annual visit  Feeling like anemia may be acting up but feeling good otherwise  Subjective     Patient presents to the office for post-op visit  Tolerating regular diet: Yes, can't tolerate pasta or rice  Nausea/Vomiting/Constipation/Diarrhea: Denies   Eating at least 60 g of protein: Yes  Following 30/60 minute role with liquids: Yes  Drinking at least 64 oz of fluid: Yes  Taking vitamin/mineral supplements: Yes  Taking omeprazole: Denies, no heartburn symptoms   Reviewed and advised patient on vitamins and supplements  Exercising: Lifts weights and walks, exercise encouraged  Initial weight: 280#  Current weight:  154#  BMI:  23 98  Weight lost since surgery: 107%   Excess body weight loss thus far since surgery discussed with patient      She notes she had hysterectomy last fall for heavy menses at the time     Patient notes having iron infusions about 3 years ago by hematologist but hasn't been seen by them since  Due for lab work  Review of Systems   Constitutional: Positive for fatigue  Negative for chills and fever  HENT: Negative for ear pain and sore throat  Eyes: Negative for pain and visual disturbance  Respiratory: Negative for cough and shortness of breath  Cardiovascular: Negative for chest pain and palpitations  Gastrointestinal: Negative for abdominal pain and vomiting  Genitourinary: Negative for dysuria and hematuria  Musculoskeletal: Negative for arthralgias and back pain  Skin: Negative for color change and rash  Neurological: Negative for seizures and syncope  All other systems reviewed and are negative        Historical Information   Past Medical History:   Diagnosis Date    Anemia     iron def    Bulla of lung ([address] )     Coccydynia     Family history of vitamin B12 deficiency     Fibroid, uterine     Hematuria     last assessed: 12/11/2013    History of hypertension     History of morbid obesity     History of vitamin B deficiency     History of vitamin D deficiency     Hypertension     Hypocalcemia     Hypocalcemia     Iron deficiency     Iron deficiency     Kyphosis deformity of spine     Menopause     Migraine     Morbid (severe) obesity due to excess calories ([address] )     per [employer]-last assessed: 4/11/2014    Narrow angle glaucoma suspect of both eyes     PONV (postoperative nausea and vomiting)     nausea only    Postgastrectomy malabsorption     Vitamin A deficiency     last assessed: 11/11/2014     Past Surgical History:   Procedure Laterality Date    APPENDECTOMY      AUGMENTATION MAMMAPLASTY Bilateral 12/26/2018    BRAIN SURGERY  1990    AVM    BREAST BIOPSY Left 10/12/218    x2    BUNIONECTOMY Right 10/2/2019    Procedure: EXOSTECTOMY RIGHT FOOT;  Surgeon: Laya Beltrán DPM;  Location: [address] [place] [address] OR;  Service: Podiatry   [place] [id number]    COSMETIC SURGERY      tummy tuck,breast implants and lift    ENDOMETRIAL ABLATION  08/2018    HYSTERECTOMY      DE LAP, SUPRACERVIAL HYSTERECTOMY W/ TUBE&OV, <250G N/A 9/16/2019    Procedure: HYSTERECTOMY LAPAROSCOPIC SUPRACERVICAL ([address])  WITH B/L SALPINGECTOMY   EXCISION OF ENDOMETRIAL CYST;  Surgeon: Aleida Plascencia DO;  Location: [facility];  Service: Gynecology    REFRACTIVE SURGERY      glaucoma    HITESH-EN-Y PROCEDURE  2014    TONSILLECTOMY AND ADENOIDECTOMY      including adenoids    TOOTH EXTRACTION      TUBAL LIGATION      US GUIDANCE BREAST BIOPSY LEFT EACH ADDITIONAL Left 10/12/2018    US GUIDED BREAST BIOPSY LEFT COMPLETE Left 10/12/2018     Social History   Social History     Substance and Sexual Activity   Alcohol Use Yes    Alcohol/week: 1 0 standard drinks    Types: 1 Standard drinks or equivalent per week    Frequency: 2-4 times a month    Drinks per session: 1 or 2    Binge frequency: Never    Comment: social     Social History     Substance and Sexual Activity   Drug Use No     Social History     Tobacco Use   Smoking Status Never Smoker   Smokeless Tobacco Never Used       Objective       Current Vitals:   Blood Pressure: 118/60 (04/22/21 1505)  Pulse: 77 (04/22/21 1505)  Temperature: 98 5 °F (36 9 °C) (04/22/21 1505)  Temp Source: Tympanic (04/22/21 1505)  Height: 5' 7 3" (170 9 cm) (04/22/21 1505)  Weight - Scale: 70 1 kg (154 lb 8 oz) (04/22/21 1505)    Invasive Devices     None                 Physical Exam  Constitutional:       Appearance: Normal appearance  HENT:      Head: Normocephalic and atraumatic  Nose: Nose normal       Mouth/Throat:      Mouth: Mucous membranes are moist    Eyes:      Extraocular Movements: Extraocular movements intact  Pupils: Pupils are equal, round, and reactive to light  Neck:      Musculoskeletal: Normal range of motion  Cardiovascular:      Rate and Rhythm: Normal rate and regular rhythm  Pulses: Normal pulses  Heart sounds: Normal heart sounds  Pulmonary:      Effort: Pulmonary effort is normal       Breath sounds: Normal breath sounds  Abdominal:      Palpations: Abdomen is soft  Tenderness: There is no abdominal tenderness  There is no guarding or rebound  Comments: Well healed scars   Skin:     General: Skin is warm  Neurological:      General: No focal deficit present  Mental Status: She is alert and oriented to person, place, and time  Psychiatric:         Mood and Affect: Mood normal          Behavior: Behavior normal        Assessment/PLAN:    Lv Masterson is a 39 y o  female S/P RNYGB with Dr Jonn King on 3/31/14  Feeling like anemia may be acting up but feeling good otherwise  Follow diet as discussed  Labs ordered to evaluate current status    Patient will get on Saturday  It is recommended to check with your insurance BEFORE getting labs done to make sure they are covered by your policy  Make sure to HOLD any multivitamins that may contain biotin and any biotin supplements FOR 5 DAYS before any labs since it can affect the results  IMPORTANT: if you have a [facility]" account, you will receive a letter of your vitamin/mineral results through the computer  Please watch for an update to your chart since recommendations for supplement adjustments will be sent to you this way  Follow vitamin and mineral recommendations as reviewed with you  Bariatric vitamins are highly recommended  Vitamins are important for a life-time to avoid low levels which can lead to other medical problems  Exercise as tolerated  Patient to continue to walk and weight lift as knee tolerates  Call the office if you have any problems with abdominal pain especially associated with fever, chills, nausea, vomiting or any other concerns  All Post-bariatric surgery patients should be aware that very small quantities of any alcohol can cause impairment and it is very possible not to feel the effect  The effect can be in the system for several hours and it is also a stomach irritant  It is advised to AVOID alcohol, Nonsteroidal anti-inflammatory drugs (NSAIDS) and nicotine of all forms  Any of these can cause stomach irritation/pain  Most, if not all, post-gastric surgery patients would benefit from having a regular counselor for at least 2 years post-op to help with need for multiple life-style changes  If you are interested in this and need help finding a regular counselor, you can also make a follow-up with our  to help you find one  Follow-up in 1 year  We kindly ask that you arrive 15 minutes before appointment time to allow for our staff to room you and check your vital signs and update your chart  We thank you for your patience at your visit  Thereasa Curling Rupell, MONSTER  Bariatric Surgery  4/22/2021  3:20 PM

## 2021-04-25 ENCOUNTER — APPOINTMENT (OUTPATIENT)
Dept: LAB | Age: 46
End: 2021-04-25
Payer: COMMERCIAL

## 2021-04-25 DIAGNOSIS — R53.82 CHRONIC FATIGUE: ICD-10-CM

## 2021-04-25 DIAGNOSIS — E61.1 IRON DEFICIENCY: ICD-10-CM

## 2021-04-25 DIAGNOSIS — K91.2 POSTSURGICAL MALABSORPTION: ICD-10-CM

## 2021-04-25 DIAGNOSIS — Z48.815 ENCOUNTER FOR SURGICAL AFTERCARE FOLLOWING SURGERY OF DIGESTIVE SYSTEM: ICD-10-CM

## 2021-04-25 DIAGNOSIS — Z98.84 BARIATRIC SURGERY STATUS: ICD-10-CM

## 2021-04-25 DIAGNOSIS — R53.83 FATIGUE: ICD-10-CM

## 2021-04-25 LAB
25(OH)D3 SERPL-MCNC: 21.4 NG/ML (ref 30–100)
ALBUMIN SERPL BCP-MCNC: 3.6 G/DL (ref 3.5–5)
ALP SERPL-CCNC: 74 U/L (ref 46–116)
ALT SERPL W P-5'-P-CCNC: 20 U/L (ref 12–78)
ANION GAP SERPL CALCULATED.3IONS-SCNC: 4 MMOL/L (ref 4–13)
AST SERPL W P-5'-P-CCNC: 10 U/L (ref 5–45)
BILIRUB SERPL-MCNC: 0.89 MG/DL (ref 0.2–1)
BUN SERPL-MCNC: 17 MG/DL (ref 5–25)
CALCIUM SERPL-MCNC: 8.7 MG/DL (ref 8.3–10.1)
CHLORIDE SERPL-SCNC: 111 MMOL/L (ref 100–108)
CHOLEST SERPL-MCNC: 165 MG/DL (ref 50–200)
CO2 SERPL-SCNC: 26 MMOL/L (ref 21–32)
CREAT SERPL-MCNC: 0.69 MG/DL (ref 0.6–1.3)
ERYTHROCYTE [DISTWIDTH] IN BLOOD BY AUTOMATED COUNT: 12.9 % (ref 11.6–15.1)
EST. AVERAGE GLUCOSE BLD GHB EST-MCNC: 105 MG/DL
FERRITIN SERPL-MCNC: 17 NG/ML (ref 8–388)
FOLATE SERPL-MCNC: 15.4 NG/ML (ref 3.1–17.5)
GFR SERPL CREATININE-BSD FRML MDRD: 105 ML/MIN/1.73SQ M
GLUCOSE P FAST SERPL-MCNC: 89 MG/DL (ref 65–99)
HBA1C MFR BLD: 5.3 %
HCT VFR BLD AUTO: 45.2 % (ref 34.8–46.1)
HDLC SERPL-MCNC: 70 MG/DL
HGB BLD-MCNC: 15 G/DL (ref 11.5–15.4)
IRON SATN MFR SERPL: 31 %
IRON SERPL-MCNC: 115 UG/DL (ref 50–170)
LDLC SERPL CALC-MCNC: 85 MG/DL (ref 0–100)
MCH RBC QN AUTO: 30.5 PG (ref 26.8–34.3)
MCHC RBC AUTO-ENTMCNC: 33.2 G/DL (ref 31.4–37.4)
MCV RBC AUTO: 92 FL (ref 82–98)
PLATELET # BLD AUTO: 208 THOUSANDS/UL (ref 149–390)
PMV BLD AUTO: 10.4 FL (ref 8.9–12.7)
POTASSIUM SERPL-SCNC: 4.4 MMOL/L (ref 3.5–5.3)
PROT SERPL-MCNC: 7 G/DL (ref 6.4–8.2)
PTH-INTACT SERPL-MCNC: 45.4 PG/ML (ref 18.4–80.1)
RBC # BLD AUTO: 4.91 MILLION/UL (ref 3.81–5.12)
SODIUM SERPL-SCNC: 141 MMOL/L (ref 136–145)
TIBC SERPL-MCNC: 371 UG/DL (ref 250–450)
TRIGL SERPL-MCNC: 49 MG/DL
VIT B12 SERPL-MCNC: 287 PG/ML (ref 100–900)
WBC # BLD AUTO: 5.92 THOUSAND/UL (ref 4.31–10.16)

## 2021-04-25 PROCEDURE — 84425 ASSAY OF VITAMIN B-1: CPT

## 2021-04-25 PROCEDURE — 82746 ASSAY OF FOLIC ACID SERUM: CPT

## 2021-04-25 PROCEDURE — 36415 COLL VENOUS BLD VENIPUNCTURE: CPT

## 2021-04-25 PROCEDURE — 83540 ASSAY OF IRON: CPT

## 2021-04-25 PROCEDURE — 83970 ASSAY OF PARATHORMONE: CPT

## 2021-04-25 PROCEDURE — 83036 HEMOGLOBIN GLYCOSYLATED A1C: CPT

## 2021-04-25 PROCEDURE — 82607 VITAMIN B-12: CPT

## 2021-04-25 PROCEDURE — 85027 COMPLETE CBC AUTOMATED: CPT

## 2021-04-25 PROCEDURE — 82728 ASSAY OF FERRITIN: CPT

## 2021-04-25 PROCEDURE — 80053 COMPREHEN METABOLIC PANEL: CPT

## 2021-04-25 PROCEDURE — 80061 LIPID PANEL: CPT

## 2021-04-25 PROCEDURE — 83550 IRON BINDING TEST: CPT

## 2021-04-25 PROCEDURE — 82306 VITAMIN D 25 HYDROXY: CPT

## 2021-04-25 PROCEDURE — 84630 ASSAY OF ZINC: CPT

## 2021-04-25 PROCEDURE — 82525 ASSAY OF COPPER: CPT

## 2021-04-25 PROCEDURE — 84590 ASSAY OF VITAMIN A: CPT

## 2021-04-27 ENCOUNTER — OFFICE VISIT (OUTPATIENT)
Dept: INTERNAL MEDICINE CLINIC | Facility: CLINIC | Age: 46
End: 2021-04-27
Payer: COMMERCIAL

## 2021-04-27 VITALS
BODY MASS INDEX: 24.36 KG/M2 | OXYGEN SATURATION: 97 % | SYSTOLIC BLOOD PRESSURE: 110 MMHG | HEART RATE: 73 BPM | DIASTOLIC BLOOD PRESSURE: 70 MMHG | WEIGHT: 155.2 LBS | HEIGHT: 67 IN | TEMPERATURE: 99 F

## 2021-04-27 DIAGNOSIS — E55.9 VITAMIN D DEFICIENCY: ICD-10-CM

## 2021-04-27 DIAGNOSIS — Z98.84 BARIATRIC SURGERY STATUS: ICD-10-CM

## 2021-04-27 DIAGNOSIS — Z00.00 ANNUAL PHYSICAL EXAM: Primary | ICD-10-CM

## 2021-04-27 PROBLEM — M25.561 ACUTE PAIN OF RIGHT KNEE: Status: RESOLVED | Noted: 2021-02-03 | Resolved: 2021-04-27

## 2021-04-27 PROBLEM — Z01.818 PREOP EXAMINATION: Status: RESOLVED | Noted: 2019-09-11 | Resolved: 2021-04-27

## 2021-04-27 PROCEDURE — 1036F TOBACCO NON-USER: CPT | Performed by: INTERNAL MEDICINE

## 2021-04-27 PROCEDURE — 99396 PREV VISIT EST AGE 40-64: CPT | Performed by: INTERNAL MEDICINE

## 2021-04-27 PROCEDURE — 3008F BODY MASS INDEX DOCD: CPT | Performed by: INTERNAL MEDICINE

## 2021-04-27 RX ORDER — ERGOCALCIFEROL 1.25 MG/1
50000 CAPSULE ORAL WEEKLY
Qty: 12 CAPSULE | Refills: 0 | Status: SHIPPED | OUTPATIENT
Start: 2021-04-27 | End: 2021-08-14

## 2021-04-27 NOTE — PROGRESS NOTES
Assessment/Plan:    No problem-specific Assessment & Plan notes found for this encounter  Diagnoses and all orders for this visit:    Vitamin D deficiency  -     ergocalciferol (VITAMIN D2) 50,000 units; Take 1 capsule (50,000 Units total) by mouth once a week            Depression Screening and Follow-up Plan: Clincally patient does not have depression  No treatment is required  Patient advised to follow-up with PCP for further management  Time spent during encounter: 25 minutes (counseling, reviewing medications, and discussing treatment and plan)    Subjective:      Patient ID: Derek Escobedo is a 39 y o  female  Chief Complaint   Patient presents with    Physical Exam    Tick Removal     found tick on her this morning , tick was located at her left lower abdomine (wast line)         HPI    The following portions of the patient's history were reviewed and updated as appropriate: allergies, current medications, past family history, past medical history, past social history, past surgical history and problem list     Review of Systems   Constitutional: Negative for activity change, appetite change, chills, diaphoresis, fatigue and fever  HENT: Negative for congestion, postnasal drip, rhinorrhea, sinus pressure, sinus pain, sneezing and sore throat  Eyes: Negative for visual disturbance  Respiratory: Negative for apnea, cough, choking, chest tightness, shortness of breath and wheezing  Cardiovascular: Negative for chest pain, palpitations and leg swelling  Gastrointestinal: Negative for abdominal distention, abdominal pain, anal bleeding, blood in stool, constipation, diarrhea, nausea and vomiting  Endocrine: Negative for cold intolerance and heat intolerance  Genitourinary: Negative for difficulty urinating, dysuria and hematuria  Musculoskeletal: Negative  Skin: Negative  Neurological: Negative for dizziness, weakness, light-headedness, numbness and headaches  Hematological: Negative for adenopathy  Psychiatric/Behavioral: Negative for agitation, sleep disturbance and suicidal ideas  All other systems reviewed and are negative          Past Medical History:   Diagnosis Date    Anemia     iron def    Bulla of lung ([address] )     Coccydynia     Family history of vitamin B12 deficiency     Fibroid, uterine     Hematuria     last assessed: 12/11/2013    History of hypertension     History of morbid obesity     History of vitamin B deficiency     History of vitamin D deficiency     Hypertension     Hypocalcemia     Hypocalcemia     Iron deficiency     Iron deficiency     Kyphosis deformity of spine     Menopause     Migraine     Morbid (severe) obesity due to excess calories ([address] )     per [employer]-last assessed: 4/11/2014    Narrow angle glaucoma suspect of both eyes     PONV (postoperative nausea and vomiting)     nausea only    Postgastrectomy malabsorption     Vitamin A deficiency     last assessed: 11/11/2014         Current Outpatient Medications:     acetaminophen (TYLENOL) 500 mg tablet, Take 500 mg by mouth every 6 (six) hours as needed for mild pain, Disp: , Rfl:     Ascorbic Acid (CVS VITAMIN C) 500 MG CHEW, Chew 1 tablet 2 (two) times a day, Disp: , Rfl:     butalbital-acetaminophen-caffeine (FIORICET,ESGIC) -40 mg per tablet, Take 1 tablet by mouth every 4 (four) hours as needed for headaches, Disp: 30 tablet, Rfl: 0    calcium carbonate-vitamin D (OSCAL-D) 500 mg-200 units per tablet, Take 1 tablet by mouth 2 (two) times a day with meals, Disp: , Rfl:     Cholecalciferol (CVS VIT D 5000 HIGH-POTENCY PO), Take by mouth, Disp: , Rfl:     cyanocobalamin (VITAMIN B-12) 500 mcg tablet, Take 500 mcg by mouth daily, Disp: , Rfl:     ferrous sulfate (CVS IRON) 325 (65 Fe) mg tablet, Take 325 mg by mouth 2 (two) times a day, Disp: , Rfl:     multivitamin (THERAGRAN) TABS, Take 1 tablet by mouth 2 (two) times a day, Disp: , Rfl:    vitamin A 3 MG (27812 UT) capsule, Take 10,000 Units by mouth daily, Disp: , Rfl:     ergocalciferol (VITAMIN D2) 50,000 units, Take 1 capsule (50,000 Units total) by mouth once a week, Disp: 12 capsule, Rfl: 0    Allergies   Allergen Reactions    Pollen Extract Allergic Rhinitis    Suture      Red and break open       Social History   Past Surgical History:   Procedure Laterality Date    APPENDECTOMY      AUGMENTATION MAMMAPLASTY Bilateral 12/26/2018    BRAIN SURGERY  1990    AVM    BREAST BIOPSY Left 10/12/218    x2    BUNIONECTOMY Right 10/2/2019    Procedure: EXOSTECTOMY RIGHT FOOT;  Surgeon: Sujit Ramos DPM;  Location: [facility] OR;  Service: [address]      tummy tuck,breast implants and lift    ENDOMETRIAL ABLATION  08/2018    HYSTERECTOMY      TX LAP, SUPRACERVIAL HYSTERECTOMY W/ TUBE&OV, <250G N/A 9/16/2019    Procedure: HYSTERECTOMY LAPAROSCOPIC SUPRACERVICAL ([address] [place] [address])  WITH B/L SALPINGECTOMY   EXCISION OF ENDOMETRIAL CYST;  Surgeon: Endy Esteves DO;  Location: [facility];  Service: Gynecology    REFRACTIVE SURGERY      glaucoma    HITESH-EN-Y PROCEDURE  2014    TONSILLECTOMY AND ADENOIDECTOMY      including adenoids    TOOTH EXTRACTION      TUBAL LIGATION      US GUIDANCE BREAST BIOPSY LEFT EACH ADDITIONAL Left 10/12/2018    US GUIDED BREAST BIOPSY LEFT COMPLETE Left 10/12/2018     Family History   Problem Relation Age of Onset    Hypertension Mother     Atrial fibrillation Mother     Melanoma Mother     Cancer Mother         on left upper chest    Graves' disease Mother     Hypertension Father     Atrial fibrillation Father     Diabetes Father     Obesity Father     Allergies Father     Heart disease Father     Stomach cancer Maternal Aunt     Other Maternal Aunt         GIST, malignant-per [employer]    Stomach cancer Paternal Aunt     No Known Problems Maternal Grandmother     Cancer Maternal Grandfather         Lung    Stroke Paternal Grandmother     Cancer Paternal Grandfather         Lung    No Known Problems Daughter     No Known Problems Paternal Aunt     No Known Problems Paternal Aunt     No Known Problems Paternal Aunt     No Known Problems Paternal Aunt     Depression Neg Hx     Anxiety disorder Neg Hx     Drug abuse Neg Hx        Objective:  /70 (BP Location: Left arm, Patient Position: Sitting, Cuff Size: Adult)   Pulse 73   Temp 99 °F (37 2 °C)   Ht 5' 7" (1 702 m)   Wt 70 4 kg (155 lb 3 2 oz)   LMP 09/09/2019   SpO2 97%   BMI 24 31 kg/m²     Recent Results (from the past 1344 hour(s))   Retinol Binding Protein    Collection Time: 04/13/21  7:10 AM   Result Value Ref Range    Miscellaneous Lab Test Result see written report    CBC    Collection Time: 04/25/21  8:13 AM   Result Value Ref Range    WBC 5 92 4 31 - 10 16 Thousand/uL    RBC 4 91 3 81 - 5 12 Million/uL    Hemoglobin 15 0 11 5 - 15 4 g/dL    Hematocrit 45 2 34 8 - 46 1 %    MCV 92 82 - 98 fL    MCH 30 5 26 8 - 34 3 pg    MCHC 33 2 31 4 - 37 4 g/dL    RDW 12 9 11 6 - 15 1 %    Platelets 567 145 - 992 Thousands/uL    MPV 10 4 8 9 - 12 7 fL   Vitamin D 25 hydroxy    Collection Time: 04/25/21  8:13 AM   Result Value Ref Range    Vit D, 25-Hydroxy 21 4 (L) 30 0 - 100 0 ng/mL   Vitamin B12    Collection Time: 04/25/21  8:13 AM   Result Value Ref Range    Vitamin B-12 287 100 - 900 pg/mL   PTH, intact    Collection Time: 04/25/21  8:13 AM   Result Value Ref Range    PTH 45 4 18 4 - 80 1 pg/mL   Lipid Panel with Direct LDL reflex    Collection Time: 04/25/21  8:13 AM   Result Value Ref Range    Cholesterol 165 50 - 200 mg/dL    Triglycerides 49 <=150 mg/dL    HDL, Direct 70 >=40 mg/dL    LDL Calculated 85 0 - 100 mg/dL   Iron Saturation %    Collection Time: 04/25/21  8:13 AM   Result Value Ref Range    Iron Saturation 31 %    TIBC 371 250 - 450 ug/dL    Iron 115 50 - 170 ug/dL   Hemoglobin A1C    Collection Time: 04/25/21  8:13 AM   Result Value Ref Range    Hemoglobin A1C 5 3 Normal 3 8-5 6%; PreDiabetic 5 7-6 4%;  Diabetic >=6 5%; Glycemic control for adults with diabetes <7 0% %     mg/dl   Folate    Collection Time: 04/25/21  8:13 AM   Result Value Ref Range    Folate 15 4 3 1 - 17 5 ng/mL   Ferritin    Collection Time: 04/25/21  8:13 AM   Result Value Ref Range    Ferritin 17 8 - 388 ng/mL   Comprehensive metabolic panel    Collection Time: 04/25/21  8:13 AM   Result Value Ref Range    Sodium 141 136 - 145 mmol/L    Potassium 4 4 3 5 - 5 3 mmol/L    Chloride 111 (H) 100 - 108 mmol/L    CO2 26 21 - 32 mmol/L    ANION GAP 4 4 - 13 mmol/L    BUN 17 5 - 25 mg/dL    Creatinine 0 69 0 60 - 1 30 mg/dL    Glucose, Fasting 89 65 - 99 mg/dL    Calcium 8 7 8 3 - 10 1 mg/dL    AST 10 5 - 45 U/L    ALT 20 12 - 78 U/L    Alkaline Phosphatase 74 46 - 116 U/L    Total Protein 7 0 6 4 - 8 2 g/dL    Albumin 3 6 3 5 - 5 0 g/dL    Total Bilirubin 0 89 0 20 - 1 00 mg/dL    eGFR 105 ml/min/1 73sq m            Physical Exam

## 2021-04-27 NOTE — PROGRESS NOTES
ADULT ANNUAL [id number] [facility] PRIMARY CARE [employer]    NAME: Rosemary Polanco  AGE: 39 y o  SEX: female  : 1975     DATE: 2021     Assessment and Plan:     Problem List Items Addressed This Visit        Other    Bariatric surgery status    Annual physical exam - Primary     She is up-to-date on immunizations  Continue lifestyle modifications with diet and exercise  Continue follow-up with bariatric surgery  She is up-to-date on metabolic screening  Vitamin D deficiency     Given that she is currently taking 5000 units of vitamin-D daily, will add additional vitamin-D, ergocalciferol 74605 units weekly for 12 weeks  Continue vitamin-D 5000 units daily thereafter  Continue calcium supplementation  She is up-to-date on breast cancer screening  Relevant Medications    ergocalciferol (VITAMIN D2) 50,000 units          Immunizations and preventive care screenings were discussed with patient today  Appropriate education was printed on patient's after visit summary  Counseling:  Alcohol/drug use: discussed moderation in alcohol intake, the recommendations for healthy alcohol use, and avoidance of illicit drug use  Dental Health: discussed importance of regular tooth brushing, flossing, and dental visits  Injury prevention: discussed safety/seat belts, safety helmets, smoke detectors, carbon dioxide detectors, and smoking near bedding or upholstery  Sexual health: discussed sexually transmitted diseases, partner selection, use of condoms, avoidance of unintended pregnancy, and contraceptive alternatives  · Exercise: the importance of regular exercise/physical activity was discussed  Recommend exercise 3-5 times per week for at least 30 minutes  Depression Screening and Follow-up Plan: Clincally patient does not have depression  No treatment is required  Patient advised to follow-up with PCP for further management  Return in about 1 year (around 4/27/2022) for Annual physical      Chief Complaint:     Chief Complaint   Patient presents with    Physical Exam    Tick Removal     found tick on her this morning , tick was located at her left lower abdomine (wast line)        History of Present Illness:     Adult Annual Physical   Patient here for a comprehensive physical exam  The patient reports No problems  She removed a tick from her left lower abdomen yesterday  She denies any rash surrounding the area of tick bite  The tick was engorged  Diet and Physical Activity  · Diet/Nutrition: well balanced diet  · Exercise: 3-4 times a week on average  Depression Screening  PHQ-9 Depression Screening    PHQ-9:   Frequency of the following problems over the past two weeks:           General Health  · Sleep: sleeps well and gets 4-6 hours of sleep on average  · Hearing: normal - bilateral   · Vision: no vision problems and wears glasses  · Dental: regular dental visits, brushes teeth twice daily and flosses teeth occasionally  /GYN Health  · Patient is: premenopausal  · Last menstrual period:  Status post hysterectomy  ·      Review of Systems:     Review of Systems   Constitutional: Negative for activity change, appetite change, chills, diaphoresis, fatigue and fever  HENT: Negative for congestion, postnasal drip, rhinorrhea, sinus pressure, sinus pain, sneezing and sore throat  Eyes: Negative for visual disturbance  Respiratory: Negative for apnea, cough, choking, chest tightness, shortness of breath and wheezing  Cardiovascular: Negative for chest pain, palpitations and leg swelling  Gastrointestinal: Negative for abdominal distention, abdominal pain, anal bleeding, blood in stool, constipation, diarrhea, nausea and vomiting  Endocrine: Negative for cold intolerance and heat intolerance  Genitourinary: Negative for difficulty urinating, dysuria and hematuria     Musculoskeletal: Negative  Skin: Negative  Neurological: Negative for dizziness, weakness, light-headedness, numbness and headaches  Hematological: Negative for adenopathy  Psychiatric/Behavioral: Negative for agitation, sleep disturbance and suicidal ideas  All other systems reviewed and are negative  Past Medical History:     Past Medical History:   Diagnosis Date    Anemia     iron def    Bulla of lung ([address] )     Coccydynia     Family history of vitamin B12 deficiency     Fibroid, uterine     Hematuria     last assessed: 12/11/2013    History of hypertension     History of morbid obesity     History of vitamin B deficiency     History of vitamin D deficiency     Hypertension     Hypocalcemia     Hypocalcemia     Iron deficiency     Iron deficiency     Kyphosis deformity of spine     Menopause     Migraine     Morbid (severe) obesity due to excess calories ([address] )     per [employer]-last assessed: 4/11/2014    Narrow angle glaucoma suspect of both eyes     PONV (postoperative nausea and vomiting)     nausea only    Postgastrectomy malabsorption     Vitamin A deficiency     last assessed: 11/11/2014      Past Surgical History:     Past Surgical History:   Procedure Laterality Date    APPENDECTOMY      AUGMENTATION MAMMAPLASTY Bilateral 12/26/2018    BRAIN SURGERY  1990    AVM    BREAST BIOPSY Left 10/12/218    x2    BUNIONECTOMY Right 10/2/2019    Procedure: EXOSTECTOMY RIGHT FOOT;  Surgeon: Dariana Carr DPM;  Location: [address] [place] [address];  Service: Podiatry   [place] [address]    COSMETIC SURGERY      tummy tuck,breast implants and lift    ENDOMETRIAL ABLATION  08/2018    HYSTERECTOMY      NH LAP, SUPRACERVIAL HYSTERECTOMY W/ TUBE&OV, <250G N/A 9/16/2019    Procedure: HYSTERECTOMY LAPAROSCOPIC SUPRACERVICAL ([facility])  WITH B/L SALPINGECTOMY   EXCISION OF ENDOMETRIAL CYST;  Surgeon: Tip Shen DO;  Location: [facility];  Service: Gynecology    REFRACTIVE SURGERY      glaucoma    HITESH-EN-Y PROCEDURE  2014    TONSILLECTOMY AND ADENOIDECTOMY      including adenoids    TOOTH EXTRACTION      TUBAL LIGATION      US GUIDANCE BREAST BIOPSY LEFT EACH ADDITIONAL Left 10/12/2018    US GUIDED BREAST BIOPSY LEFT COMPLETE Left 10/12/2018      Social History:     E-Cigarette/Vaping    E-Cigarette Use Never User      E-Cigarette/Vaping Substances    Nicotine No     THC No     CBD No     Flavoring No     Other No     Unknown No      Social History     Socioeconomic History    Marital status: Single     Spouse name: None    Number of children: None    Years of education: None    Highest education level: None   Occupational History    None   Social Needs    Financial resource strain: None    Food insecurity     Worry: None     Inability: None    Transportation needs     Medical: None     Non-medical: None   Tobacco Use    Smoking status: Never Smoker    Smokeless tobacco: Never Used   Substance and Sexual Activity    Alcohol use:  Yes     Alcohol/week: 1 0 standard drinks     Types: 1 Standard drinks or equivalent per week     Frequency: 2-4 times a month     Drinks per session: 1 or 2     Binge frequency: Never     Comment: social    Drug use: No    Sexual activity: Yes     Partners: Male     Comment: hysterectomy   Lifestyle    Physical activity     Days per week: None     Minutes per session: None    Stress: None   Relationships    Social connections     Talks on phone: None     Gets together: None     Attends [employer] service: None     Active member of club or organization: None     Attends meetings of clubs or organizations: None     Relationship status: None    Intimate partner violence     Fear of current or ex partner: None     Emotionally abused: None     Physically abused: None     Forced sexual activity: None   Other Topics Concern    None   Social History Narrative    None      Family History:     Family History   Problem Relation Age of Onset    Hypertension Mother  Atrial fibrillation Mother    Clifm Jason Melanoma Mother     Cancer Mother         on left upper chest    Graves' disease Mother     Hypertension Father     Atrial fibrillation Father     Diabetes Father     Obesity Father     Allergies Father     Heart disease Father     Stomach cancer Maternal Aunt     Other Maternal Aunt         GIST, malignant-per [employer]    Stomach cancer Paternal Aunt     No Known Problems Maternal Grandmother     Cancer Maternal Grandfather         Lung    Stroke Paternal Grandmother     Cancer Paternal Grandfather         Lung    No Known Problems Daughter     No Known Problems Paternal Aunt     No Known Problems Paternal Aunt     No Known Problems Paternal Aunt     No Known Problems Paternal Aunt     Depression Neg Hx     Anxiety disorder Neg Hx     Drug abuse Neg Hx       Current Medications:     Current Outpatient Medications   Medication Sig Dispense Refill    acetaminophen (TYLENOL) 500 mg tablet Take 500 mg by mouth every 6 (six) hours as needed for mild pain      Ascorbic Acid ([employer] VITAMIN C) 500 MG CHEW Chew 1 tablet 2 (two) times a day      butalbital-acetaminophen-caffeine (FIORICET,ESGIC) -40 mg per tablet Take 1 tablet by mouth every 4 (four) hours as needed for headaches 30 tablet 0    calcium carbonate-vitamin D (OSCAL-D) 500 mg-200 units per tablet Take 1 tablet by mouth 2 (two) times a day with meals      Cholecalciferol ([employer] VIT D 5000 HIGH-POTENCY PO) Take by mouth      cyanocobalamin (VITAMIN B-12) 500 mcg tablet Take 500 mcg by mouth daily      ferrous sulfate ([employer] IRON) 325 (65 Fe) mg tablet Take 325 mg by mouth 2 (two) times a day      multivitamin (THERAGRAN) TABS Take 1 tablet by mouth 2 (two) times a day      vitamin A 3 MG (92289 UT) capsule Take 10,000 Units by mouth daily      ergocalciferol (VITAMIN D2) 50,000 units Take 1 capsule (50,000 Units total) by mouth once a week 12 capsule 0     No current facility-administered medications for this visit  Allergies: Allergies   Allergen Reactions    Pollen Extract Allergic Rhinitis    Suture      Red and break open      Physical Exam:     /70 (BP Location: Left arm, Patient Position: Sitting, Cuff Size: Adult)   Pulse 73   Temp 99 °F (37 2 °C)   Ht 5' 7" (1 702 m)   Wt 70 4 kg (155 lb 3 2 oz)   LMP 09/09/2019   SpO2 97%   BMI 24 31 kg/m²     Physical Exam  Vitals signs and nursing note reviewed  Constitutional:       General: She is not in acute distress  Appearance: Normal appearance  She is normal weight  She is not ill-appearing  HENT:      Head: Normocephalic and atraumatic  Right Ear: Tympanic membrane, ear canal and external ear normal  There is no impacted cerumen  Left Ear: Tympanic membrane, ear canal and external ear normal  There is no impacted cerumen  Nose: Nose normal  No congestion or rhinorrhea  Mouth/Throat:      Mouth: Mucous membranes are moist       Pharynx: Oropharynx is clear  No oropharyngeal exudate or posterior oropharyngeal erythema  Eyes:      General: No scleral icterus  Right eye: No discharge  Left eye: No discharge  Extraocular Movements: Extraocular movements intact  Conjunctiva/sclera: Conjunctivae normal       Pupils: Pupils are equal, round, and reactive to light  Neck:      Musculoskeletal: Normal range of motion and neck supple  No neck rigidity or muscular tenderness  Vascular: No carotid bruit  Cardiovascular:      Rate and Rhythm: Normal rate and regular rhythm  Pulses: Normal pulses  Heart sounds: Normal heart sounds  No murmur  No friction rub  No gallop  Pulmonary:      Effort: Pulmonary effort is normal  No respiratory distress  Breath sounds: Normal breath sounds  No stridor  No wheezing, rhonchi or rales  Chest:      Chest wall: No tenderness  Abdominal:      General: Abdomen is flat  Bowel sounds are normal  There is no distension  Palpations: Abdomen is soft  There is no mass  Tenderness: There is no abdominal tenderness  Hernia: No hernia is present  Musculoskeletal: Normal range of motion  General: No swelling, tenderness, deformity or signs of injury  Right lower leg: No edema  Left lower leg: No edema  Lymphadenopathy:      Cervical: No cervical adenopathy  Skin:     General: Skin is warm and dry  Capillary Refill: Capillary refill takes less than 2 seconds  Coloration: Skin is not jaundiced or pale  Findings: No bruising, erythema, lesion or rash  Neurological:      General: No focal deficit present  Mental Status: She is alert and oriented to person, place, and time  Mental status is at baseline  Cranial Nerves: No cranial nerve deficit  Sensory: No sensory deficit  Motor: No weakness  Coordination: Coordination normal       Gait: Gait normal       Deep Tendon Reflexes: Reflexes normal    Psychiatric:         Mood and Affect: Mood normal          Behavior: Behavior normal          Thought Content:  Thought content normal          Judgment: Judgment normal           MD [facility] [place]

## 2021-04-27 NOTE — ASSESSMENT & PLAN NOTE
Given that she is currently taking 5000 units of vitamin-D daily, will add additional vitamin-D, ergocalciferol 48790 units weekly for 12 weeks  Continue vitamin-D 5000 units daily thereafter  Continue calcium supplementation  She is up-to-date on breast cancer screening

## 2021-04-27 NOTE — ASSESSMENT & PLAN NOTE
She is up-to-date on immunizations  Continue lifestyle modifications with diet and exercise  Continue follow-up with bariatric surgery  She is up-to-date on metabolic screening

## 2021-04-28 LAB — ZINC SERPL-MCNC: 64 UG/DL (ref 44–115)

## 2021-04-29 LAB — COPPER SERPL-MCNC: 79 UG/DL (ref 80–158)

## 2021-04-30 LAB — VIT B1 BLD-SCNC: 149.3 NMOL/L (ref 66.5–200)

## 2021-05-01 LAB — VIT A SERPL-MCNC: 23.7 UG/DL (ref 20.1–62)

## 2021-08-14 ENCOUNTER — OFFICE VISIT (OUTPATIENT)
Dept: URGENT CARE | Age: 46
End: 2021-08-14
Payer: COMMERCIAL

## 2021-08-14 VITALS — WEIGHT: 155 LBS | BODY MASS INDEX: 24.33 KG/M2 | HEIGHT: 67 IN

## 2021-08-14 DIAGNOSIS — J06.9 ACUTE URI: Primary | ICD-10-CM

## 2021-08-14 PROCEDURE — 87635 SARS-COV-2 COVID-19 AMP PRB: CPT | Performed by: PREVENTIVE MEDICINE

## 2021-08-14 PROCEDURE — 99213 OFFICE O/P EST LOW 20 MIN: CPT | Performed by: PREVENTIVE MEDICINE

## 2021-08-14 NOTE — PATIENT INSTRUCTIONS
COVID-19 (Coronavirus Disease 2019)   AMBULATORY CARE:   Coronavirus disease 2019 (COVID-19)  is the disease caused by a coronavirus first discovered in December 2019  Coronaviruses generally cause upper respiratory (nose, throat, and lung) infections, such as a cold  The new virus spreads quickly and easily  The virus can be spread starting 2 days before symptoms even begin  The virus has also changed into several new forms (called variants) since it was discovered  The variants may be more contagious (easily spread) than the original form  Some may also cause more severe illness than others  It is important to follow local, national, and worldwide measures to protect yourself and others from infection  Signs and symptoms of COVID-19  may not develop  Signs and symptoms that develop usually start about 5 days after infection but can take 2 to 14 days  You may feel like you have the flu or a bad cold  Some signs and symptoms go away in a few days  Others can last weeks, months, or possibly years  Information on COVID-19 is still being learned  Tell your healthcare provider if you think you were infected but develop signs or symptoms not listed below:  · A cough    · Shortness of breath or trouble breathing that may become severe    · A fever of at least 100 4°F, or 38°C (may be lower in adults 65 or older)    · Chills that might include shaking    · Muscle pain, body aches, or a headache    · A sore throat    · Suddenly not being able to taste or smell anything    · Feeling mentally and physically tired (fatigue)    · Congestion (stuffy head and nose), or a runny nose    · Diarrhea, nausea, or vomiting    If you think you or someone you know may be infected:  Do the following to protect others:  · If emergency care is needed,  tell the  about the possible infection, or call ahead and tell the emergency department  · Call a healthcare provider  for instructions if symptoms are mild   Anyone who may be infected should not  arrive without calling first  The provider will need to protect staff members and other patients  · The person who may be infected needs to wear a face covering  while getting medical care  This will help lower the risk of infecting others  Coverings are not used for anyone who is younger than 2 years, has breathing problems, or cannot remove it  The provider can give you instructions for anyone who cannot wear a covering  Call your local emergency number (911 in the [address] [facility]) or an emergency department if:   · You have trouble breathing or shortness of breath at rest     · You have chest pain or pressure that lasts longer than 5 minutes  · You become confused or hard to wake  · Your lips or face are blue  · You have a fever of 104°F (40°C) or higher  Call your doctor if:   · You do not  have symptoms of COVID-19 but had close physical contact within 14 days with someone who tested positive  · You have questions or concerns about your condition or care  How COVID-19 is diagnosed: If you think you have COVID-19, call your healthcare provider  He or she will tell you what to do based on your symptoms and testing guidelines in your area  In general, the following may be used:  · A viral test  shows if you have a current infection  Samples are taken from your nose and throat, usually with swabs  You may need to wait several days to get the test results  Your healthcare provider will tell you how to get your results  You will need to quarantine (stay physically away from others) until you get your results  If results show you have COVID-19, you will need to continue until you are well  Your provider or other health official may give you more directions  You will also need to prevent another infection until it is known if you can get COVID-19 again  · An antibody test  shows if you had a past infection   Blood samples are used for this test  Antibodies are made by your immune system to attack the virus that causes COVID-19  Antibodies will form 1 to 3 weeks after you are infected  It is not known if antibodies prevent a second infection, or for how long a person might be protected  If you have antibodies, you will still need to be careful around others until more is known  · CT scans or x-rays  may be used to check for signs of pneumonia  The 2019 coronavirus causes a specific kind of pneumonia, usually in both lungs  The pictures may also be used to check for health problems in other parts of your body  Treatment  such as monoclonal antibodies and convalescent plasma have emergency use authorization (EUA)  This means they may be given only to patients who are hospitalized with severe signs and symptoms  The following may be used to manage your symptoms:  · Mild symptoms  may get better on their own  If you do not need to be treated in a hospital, you will be given instructions to use at home  Your condition will be closely monitored  You will need to watch for worsening symptoms and seek immediate care if needed  Talk to your healthcare provider about the following:    ? Decongestants  help reduce nasal congestion and help you breathe more easily  If you take decongestant pills, they may make you feel restless or cause problems with your sleep  Do not use decongestant sprays for more than a few days  ? Cough suppressants  help reduce coughing  Ask your healthcare provider which type of cough medicine is best for you  ? To soothe a sore throat,  gargle with warm salt water, or use throat lozenges or a throat spray  Drink more liquids to thin and loosen mucus and to prevent dehydration  ? NSAIDs or acetaminophen  can help lower a fever and relieve body aches or a headache  Follow directions  If not taken correctly, NSAIDs can cause kidney damage and acetaminophen can cause liver damage      · Severe or life-threatening symptoms  are treated in the hospital  You may need a combination of the following:    ? Medicines  may be given to lower or prevent inflammation or to fight the virus  You may also need blood thinners to prevent or treat blood clots  If you have a deep vein thrombosis (DVT) or pulmonary embolism (PE), you may need to keep using blood thinners for 3 months  ? Extra oxygen  may be given if you have respiratory failure  This means your lungs cannot get enough oxygen into your blood and out to your organs  Extra oxygen can help prevent organ failure  ? A ventilator  may be used to help you breathe  What you need to know about health problems the virus may cause:  Serious health problems may improve or continue for weeks, months, and possibly years  Health problems that continue may be called long COVID  Anyone can develop serious problems from this virus, but your risk is higher if you are 72 or older  A weak immune system, diabetes, or a heart or lung condition can also increase your risk  Your risk is also higher if you are a current or former cigarette smoker  COVID-19 can lead to any of the following:  · Serious lower respiratory conditions, such as pneumonia or acute respiratory distress syndrome (ARDS)    · Blood vessel damage, leading to blood clots    · Organ damage from a lack of oxygen or from blood clots    · Sleep problems    · Problems thinking clearly, remembering information, or concentrating    · Mood changes, depression, or anxiety    How the 2019 coronavirus spreads: The following are ways the virus is thought to spread, but more information may be coming:  · Droplets are the main way all coronaviruses spread  The virus travels in droplets that form when a person talks, coughs, or sneezes  The droplets can also float in the air for minutes or hours  Infection happens when you breathe in the droplets or get them in your eyes or nose  Close personal contact with an infected person increases your risk for infection   This means being within 6 feet (2 meters) of the person for at least 15 minutes over 24 hours  · Person-to-person contact can spread the virus  For example, a person with the virus on his or her hands can spread it by shaking hands with someone  · The virus can stay on objects and surfaces for a short time  You may become infected by touching the object or surface and then touching your eyes or mouth  · An infected animal may be able to infect a person who touches it  This may happen at live markets or on a farm  Help lower the risk for COVID-19:  The best way to prevent infection is to avoid anyone who is infected, but this can be hard to do  An infected person can spread the virus before signs or symptoms begin, or even if signs or symptoms never develop  The following can help lower the risk for infection:      · Wash your hands often throughout the day  Use soap and water  Rub your soapy hands together, lacing your fingers, for at least 20 seconds  Rinse with warm, running water  Dry your hands with a clean towel or paper towel  Use hand  that contains alcohol if soap and water are not available  Teach children how to wash their hands and use hand   · Cover sneezes and coughs  Turn your face away and cover your mouth and nose with a tissue  Throw the tissue away  Use the bend of your arm if a tissue is not available  Then wash your hands well with soap and water or use hand   Teach children how to cover a cough or sneeze  · Wear a face covering (mask) around anyone who does not live in your home  Use a cloth covering with at least 2 layers  You can also create layers by putting a cloth covering over a disposable non-medical mask  Cover your mouth and your nose  The covering should fit snugly against the bridge of your nose  Securely fasten it under your chin and on the sides of your face  Do not  wear a plastic face shield instead of a covering   Continue social distancing and washing your hands often  A face covering is not a substitute for social distancing safety measures  · Follow worldwide, national, and local social distancing guidelines  Keep at least 6 feet (2 meters) between you and others  Also keep this distance from anyone who comes to your home, such as someone making a delivery  Wear a face covering while you are around others  You will need to wear a covering in restaurants, stores, and other public buildings  You will also need a covering on mass transit, such as a bus, subway, or airplane  Remember to use a covering made from thick material or wear 2 coverings together  · Make a habit of not touching your face  If you get the virus on your hands, you can transfer it to your eyes, nose, or mouth and become infected  You can also transfer it to objects, surfaces, or people  Do not touch your eyes, nose, or mouth without washing your hands first     · Clean and disinfect high-touch surfaces and objects often  Use disinfecting wipes, or make a solution of 4 teaspoons of bleach in 1 quart (4 cups) of water  Clean and disinfect even if you think no one living in or coming to your home is infected with the virus  · Ask about vaccines you may need  Get a COVID-19 vaccine when it is available to you  The current vaccines are given as a shot in 1 or 2 doses  Get the influenza (flu) vaccine as soon as recommended each year, usually starting in September or October  Get the pneumonia vaccine if recommended  Follow social distancing guidelines:  National and local social distancing rules vary  Rules may change over time as restrictions are lifted  Restrictions may return if an outbreak happens where you live  It is important to know and follow all current social distancing rules in your area  The following are general guidelines:  · Stay home if you are sick or think you may have COVID-19    It is important to stay home if you are waiting for a testing appointment or for test results  Even if you do not have symptoms, you can pass the virus to others  · Limit trips out of your home  Have food, medicines, and other supplies delivered and left at your door or other area, if possible  Plan trips out of your home so you make the fewest stops possible to limit close personal contact  Keep track of places you go  This will help contact tracers notify others if you become infected  · Avoid close physical contact with anyone who does not live in your home  Do not shake hands with, hug, or kiss a person as a greeting  If you must use public transportation (such as a bus or subway), try to sit or stand away from others  Only allow necessary people into your home  Wear your face covering, and remind others to wear a face covering  Remind them to wash their hands when they arrive and before they leave  Do not  let someone into your home or go to someone's home just to visit  Even if you both do not feel sick, the virus can pass from one of you to the other  · Avoid in-person gatherings and crowds  Gatherings or crowds of 10 or more individuals can cause the virus to spread  Avoid places such as [place], beaches, sporting events, and tourist attractions  For events such as parties, holiday meals, [employer] services, and conferences, attend virtually (on a computer), if possible  · Ask your healthcare provider for other ways to have appointments  Some providers offer phone, video, or other types of appointments  You may also be able to get prescriptions for a few months of your medicines at a time  · Stay safe if you must go out to work  Keep physical distance between you and other workers as much as possible  Follow your employer's rules so everyone stays safe  If you have COVID-19 and are recovering at home,  healthcare providers will give you specific instructions to follow   The following are general guidelines to remind you how to keep others safe until you are well:  · Wash your hands often  Use soap and water as much as possible  Use hand  that contains alcohol if soap and water are not available  Dry your hands with a clean towel or paper towel  Do not share towels with anyone  If you use paper towels, throw them away in a lined trash can kept in your room or area  Use a covered trash can, if possible  · Do not go out of your home unless it is necessary  Ask someone who is not infected to go out for groceries or supplies, or have them delivered  Do not go to your healthcare provider's office without an appointment  · Only have close physical contact with a person giving direct care, or a baby or child you must care for  Family members and friends should not visit you  If possible, stay in a separate area or room of your home if you live with others  No one should go into the area or room except to give you care  You can visit with others by phone, video chat, e-mail, or similar systems  · Wear a face covering while others are near you  This can help prevent droplets from spreading the virus when you talk, sneeze, or cough  Put the covering on before anyone comes into your room or area  Remind the person to cover his or her nose and mouth before coming in to provide care for you  · Do not share items  Do not share dishes, towels, or other items with anyone  Items need to be washed after you use them  · Protect your baby  Some newborns have tested positive for the virus  It is not known if they became infected before or after birth  The highest risk is when a  has close contact with an infected person  If you are pregnant or breastfeeding, talk to your healthcare provider or obstetrician about any concerns you have  He or she will tell you when to bring your baby in for check-ups and vaccines  He or she will also tell you what to do if you think your baby was infected with the coronavirus   Wash your hands and put on a clean face covering before you breastfeed or care for your baby  · Do not handle live animals unless it is necessary  Some animals, including pets, have been infected with the new coronavirus  Ask someone who is not infected to take care of your pet until you are well  If you must care for a pet, wear a face covering  Wash your hands before and after you give care  Talk to your healthcare provider about how to keep a service animal safe, if needed  · Follow directions from your healthcare provider for being around others after you recover  It is not known if or for how long a recovered person can pass the virus to others  Your provider may give you instructions, such as continuing social distancing and wearing a face covering  He or she will tell you when it is okay to be around others again  This may be 10 to 20 days after symptoms started or you had a positive test  Most symptoms will also need to be gone  Your provider will give you more information  Follow up with your doctor as directed:  Write down your questions so you remember to ask them during your visits  For more information:   · Centers for Disease Control and Prevention  [address] , [address]  Phone: 1- 118 - 915-2521  Web Address: [employer]    © [address] [place] [address] 2021 Information is for End User's use only and may not be sold, redistributed or otherwise used for commercial purposes  All illustrations and images included in CareNotes® are the copyrighted property of [employer]  or [facility] Dave Alcantar   The above information is an  only  It is not intended as medical advice for individual conditions or treatments  Talk to your doctor, nurse or pharmacist before following any medical regimen to see if it is safe and effective for you

## 2021-08-14 NOTE — LETTER
August 14, 2021     Patient: Akilah Xiong   YOB: 1975   Date of Visit: 8/14/2021       To Whom It May Concern: It is my medical opinion that Akilah Xiong should remain out of work until test result returns       If you have any questions or concerns, please don't hesitate to call           Sincerely,        Renée Ramos MD    CC: No Recipients

## 2021-08-16 LAB — SARS-COV-2 RNA RESP QL NAA+PROBE: NEGATIVE

## 2021-08-16 NOTE — PROGRESS NOTES
[email or web address]        NAME: Rubi Brewster is a 55 y o  female  : 1975    MRN: [id number]  DATE: 2021  TIME: 2:00 PM    Assessment and Plan   Acute URI [J06 9]  1  Acute URI  Novel Coronavirus (Covid-19),PCR [facility] - Office Collection         Patient Instructions       Follow up with PCP in 3-5 days  Proceed to  ER if symptoms worsen  Chief Complaint     Chief Complaint   Patient presents with    COVID-19     Pt states ill x1 day  headache, post nasal drip,  no fever, no cough  Pt states  she has been exposed   History of Present Illness       URI   This is a new problem  The current episode started yesterday  The problem has been unchanged  There has been no fever  Associated symptoms include congestion, headaches and a sore throat  She has tried nothing for the symptoms  The treatment provided no relief  Review of Systems   Review of Systems   Constitutional: Negative  HENT: Positive for congestion, postnasal drip and sore throat  Eyes: Negative  Respiratory: Negative  Cardiovascular: Negative  Neurological: Positive for headaches  All other systems reviewed and are negative          Current Medications       Current Outpatient Medications:     butalbital-acetaminophen-caffeine (FIORICET,ESGIC) -40 mg per tablet, Take 1 tablet by mouth every 4 (four) hours as needed for headaches, Disp: 30 tablet, Rfl: 0    calcium carbonate-vitamin D (OSCAL-D) 500 mg-200 units per tablet, Take 1 tablet by mouth 2 (two) times a day with meals, Disp: , Rfl:     Cholecalciferol ([employer] VIT D 5000 HIGH-POTENCY PO), Take by mouth, Disp: , Rfl:     cyanocobalamin (VITAMIN B-12) 500 mcg tablet, Take 500 mcg by mouth daily, Disp: , Rfl:     multivitamin (THERAGRAN) TABS, Take 1 tablet by mouth 2 (two) times a day, Disp: , Rfl:     vitamin A 3 MG (61950 UT) capsule, Take 10,000 Units by mouth daily, Disp: , Rfl:     ferrous sulfate (CVS IRON) 325 (65 Fe) mg tablet, Take 325 mg by mouth 2 (two) times a day (Patient not taking: Reported on 2021), Disp: , Rfl:     Current Allergies     Allergies as of 2021 - Reviewed 2021   Allergen Reaction Noted    Pollen extract Allergic Rhinitis 2019    Suture Other (See Comments) 2020            The following portions of the patient's history were reviewed and updated as appropriate: allergies, current medications, past family history, past medical history, past social history, past surgical history and problem list      Past Medical History:   Diagnosis Date    Anemia     iron def    Bulla of lung ([address] )     Coccydynia     Family history of vitamin B12 deficiency     Fibroid, uterine     Hematuria     last assessed: 2013    History of hypertension     History of morbid obesity     History of vitamin B deficiency     History of vitamin D deficiency     Hypertension     Hypocalcemia     Hypocalcemia     Iron deficiency     Iron deficiency     Kyphosis deformity of spine     Menopause     Migraine     Morbid (severe) obesity due to excess calories ([address] )     per [employer]-last assessed: 2014    Narrow angle glaucoma suspect of both eyes     PONV (postoperative nausea and vomiting)     nausea only    Postgastrectomy malabsorption     Vitamin A deficiency     last assessed: 2014       Past Surgical History:   Procedure Laterality Date    APPENDECTOMY      AUGMENTATION MAMMAPLASTY Bilateral 2018    BRAIN SURGERY      AVM    BREAST BIOPSY Left 10/12/218    x2    BUNIONECTOMY Right 10/2/2019    Procedure: EXOSTECTOMY RIGHT FOOT;  Surgeon: Dewey Silverman DPM;  Location: [facility] MAIN OR;  Service: Podiatry     SECTION      COSMETIC SURGERY      tummy tuck,breast implants and lift    ENDOMETRIAL ABLATION  2018    HYSTERECTOMY      ME LAP, SUPRACERVIAL HYSTERECTOMY W/ TUBE&OV, <250G N/A 2019    Procedure: HYSTERECTOMY LAPAROSCOPIC SUPRACERVICAL [facility]) WITH B/L SALPINGECTOMY  EXCISION OF ENDOMETRIAL CYST;  Surgeon: Wei Grove DO;  Location: [facility] Main OR;  Service: Gynecology    REFRACTIVE SURGERY      glaucoma    HITESH-EN-Y PROCEDURE  2014    TONSILLECTOMY AND ADENOIDECTOMY      including adenoids    TOOTH EXTRACTION      TUBAL LIGATION      US GUIDANCE BREAST BIOPSY LEFT EACH ADDITIONAL Left 10/12/2018    US GUIDED BREAST BIOPSY LEFT COMPLETE Left 10/12/2018       Family History   Problem Relation Age of Onset    Hypertension Mother     Atrial fibrillation Mother     Melanoma Mother     Cancer Mother         on left upper chest    Graves' disease Mother     Hypertension Father     Atrial fibrillation Father     Diabetes Father     Obesity Father     Allergies Father     Heart disease Father     Stomach cancer Maternal Aunt     Other Maternal Aunt         GIST, malignant-per Allscripts    Stomach cancer Paternal Aunt     No Known Problems Maternal Grandmother     Cancer Maternal Grandfather         Lung    Stroke Paternal Grandmother     Cancer Paternal Grandfather         Lung    No Known Problems Daughter     No Known Problems Paternal Aunt     No Known Problems Paternal Aunt     No Known Problems Paternal Aunt     No Known Problems Paternal Aunt     Depression Neg Hx     Anxiety disorder Neg Hx     Drug abuse Neg Hx          Medications have been verified  Objective   Ht 5' 7" (1 702 m)   Wt 70 3 kg (155 lb)   LMP 09/09/2019   BMI 24 28 kg/m²        Physical Exam     Physical Exam  Vitals and nursing note reviewed  Constitutional:       Appearance: She is well-developed  HENT:      Head: Normocephalic  Right Ear: External ear normal       Left Ear: External ear normal       Nose: Mucosal edema and congestion present  Mouth/Throat:      Pharynx: Posterior oropharyngeal erythema present  No oropharyngeal exudate  Cardiovascular:      Rate and Rhythm: Normal rate and regular rhythm        Heart sounds: Normal heart sounds  Pulmonary:      Effort: Pulmonary effort is normal       Breath sounds: No wheezing  Musculoskeletal:      Cervical back: Normal range of motion  Lymphadenopathy:      Cervical: No cervical adenopathy  Skin:     General: Skin is warm  Coloration: Skin is not pale  Findings: No rash

## 2021-09-30 ENCOUNTER — ANNUAL EXAM (OUTPATIENT)
Dept: GYNECOLOGY | Facility: CLINIC | Age: 46
End: 2021-09-30
Payer: COMMERCIAL

## 2021-09-30 VITALS
BODY MASS INDEX: 24.36 KG/M2 | DIASTOLIC BLOOD PRESSURE: 58 MMHG | HEART RATE: 66 BPM | SYSTOLIC BLOOD PRESSURE: 100 MMHG | WEIGHT: 155.2 LBS | HEIGHT: 67 IN

## 2021-09-30 DIAGNOSIS — Z12.4 ENCOUNTER FOR PAPANICOLAOU SMEAR FOR CERVICAL CANCER SCREENING: ICD-10-CM

## 2021-09-30 DIAGNOSIS — Z12.31 ENCOUNTER FOR SCREENING MAMMOGRAM FOR MALIGNANT NEOPLASM OF BREAST: Primary | ICD-10-CM

## 2021-09-30 PROCEDURE — S0612 ANNUAL GYNECOLOGICAL EXAMINA: HCPCS | Performed by: OBSTETRICS & GYNECOLOGY

## 2021-09-30 PROCEDURE — G0145 SCR C/V CYTO,THINLAYER,RESCR: HCPCS | Performed by: OBSTETRICS & GYNECOLOGY

## 2021-09-30 NOTE — PROGRESS NOTES
Assessment/Plan:         Diagnoses and all orders for this visit:    Encounter for screening mammogram for malignant neoplasm of breast  -     Mammo screening bilateral w 3d & cad; Future    Encounter for Papanicolaou smear for cervical cancer screening  -     Liquid-based pap, screening        Subjective:      Patient ID: Emery Milian is a 55 y o  female  HPI patient presents for annual examination  She is status post [facility] BS in September 2019 secondary to endometriosis, adenomyosis and leiomyomata  She denies any vaginal irritation, burning, discharge or bleeding  Denies any dysuria, hematuria urgency urinary incontinence  No GI complaints  The following portions of the patient's history were reviewed and updated as appropriate:   She  has a past medical history of Anemia, Bulla of lung ([address] ), Coccydynia, Family history of vitamin B12 deficiency, Fibroid, uterine, Hematuria, History of hypertension, History of morbid obesity, History of vitamin B deficiency, History of vitamin D deficiency, Hypertension, Hypocalcemia, Hypocalcemia, Iron deficiency, Iron deficiency, Kyphosis deformity of spine, Menopause, Migraine, Morbid (severe) obesity due to excess calories (HCC), Narrow angle glaucoma suspect of both eyes, PONV (postoperative nausea and vomiting), Postgastrectomy malabsorption, and Vitamin A deficiency    She   Patient Active Problem List    Diagnosis Date Noted    Annual physical exam 04/27/2021    Vitamin D deficiency 04/27/2021    Encounter for surgical aftercare following surgery of digestive system 04/14/2020    Fatigue 04/14/2020    Status post laparoscopic supracervical hysterectomy 09/16/2019    Exostosis of right foot 09/11/2019    Adenomyosis 08/28/2019    Bariatric surgery status 08/28/2018    Postsurgical malabsorption 08/28/2018    Abnormal CT of the chest 06/23/2017    Bulla, lung ([address] ) 06/23/2017    Anemia, iron deficiency 05/30/2017    Coccydynia 06/07/2016    Neoplasm of skin 2014     She  has a past surgical history that includes Marilee-en-y procedure ();  section (); Brain surgery (); Appendectomy; Endometrial ablation (2018); US guided breast biopsy left complete (Left, 10/12/2018); US guidance breast biopsy left each additional (Left, 10/12/2018); Cosmetic surgery; Refractive surgery; Tooth extraction; Tonsillectomy and adenoidectomy; Tubal ligation; pr lap, supracervial hysterectomy w/ tube&ov, <250g (N/A, 2019); Bunionectomy (Right, 10/2/2019); Breast biopsy (Left, 10/12/218); Augmentation mammaplasty (Bilateral, 2018); and Hysterectomy  Her family history includes Allergies in her father; Atrial fibrillation in her father and mother; Cancer in her maternal grandfather, mother, and paternal grandfather; Diabetes in her father; Elenora Lithonia' disease in her mother; Heart disease in her father; Hypertension in her father and mother; Melanoma in her mother; No Known Problems in her daughter, maternal grandmother, paternal aunt, paternal aunt, paternal aunt, and paternal aunt; Obesity in her father; Other in her maternal aunt; Stomach cancer in her maternal aunt and paternal aunt; Stroke in her paternal grandmother  She  reports that she has never smoked  She has never used smokeless tobacco  She reports current alcohol use of about 1 0 standard drinks of alcohol per week  She reports that she does not use drugs    Current Outpatient Medications   Medication Sig Dispense Refill    butalbital-acetaminophen-caffeine (FIORICET,ESGIC) -40 mg per tablet Take 1 tablet by mouth every 4 (four) hours as needed for headaches 30 tablet 0    calcium carbonate-vitamin D (OSCAL-D) 500 mg-200 units per tablet Take 1 tablet by mouth 2 (two) times a day with meals      cyanocobalamin (VITAMIN B-12) 500 mcg tablet Take 500 mcg by mouth daily      multivitamin (THERAGRAN) TABS Take 1 tablet by mouth 2 (two) times a day      Cholecalciferol ([employer] VIT D 5000 HIGH-POTENCY PO) Take by mouth (Patient not taking: Reported on 9/30/2021)      ferrous sulfate ([employer] IRON) 325 (65 Fe) mg tablet Take 325 mg by mouth 2 (two) times a day (Patient not taking: Reported on 8/14/2021)      vitamin A 3 MG (19187 UT) capsule Take 10,000 Units by mouth daily (Patient not taking: Reported on 9/30/2021)       No current facility-administered medications for this visit  Current Outpatient Medications on File Prior to Visit   Medication Sig    butalbital-acetaminophen-caffeine (FIORICET,ESGIC) -40 mg per tablet Take 1 tablet by mouth every 4 (four) hours as needed for headaches    calcium carbonate-vitamin D (OSCAL-D) 500 mg-200 units per tablet Take 1 tablet by mouth 2 (two) times a day with meals    cyanocobalamin (VITAMIN B-12) 500 mcg tablet Take 500 mcg by mouth daily    multivitamin (THERAGRAN) TABS Take 1 tablet by mouth 2 (two) times a day    Cholecalciferol ([employer]S VIT D 5000 HIGH-POTENCY PO) Take by mouth (Patient not taking: Reported on 9/30/2021)    ferrous sulfate ([employer]S IRON) 325 (65 Fe) mg tablet Take 325 mg by mouth 2 (two) times a day (Patient not taking: Reported on 8/14/2021)    vitamin A 3 MG (05628 UT) capsule Take 10,000 Units by mouth daily (Patient not taking: Reported on 9/30/2021)     No current facility-administered medications on file prior to visit  She is allergic to pollen extract and suture       Review of Systems   Constitutional: Negative  HENT: Negative for sore throat and trouble swallowing  Gastrointestinal: Negative  Genitourinary: Negative  Objective:      /58   Pulse 66   Ht 5' 7" (1 702 m)   Wt 70 4 kg (155 lb 3 2 oz)   LMP 09/09/2019   BMI 24 31 kg/m²          Physical Exam  Vitals reviewed  Constitutional:       Appearance: Normal appearance  She is normal weight  Cardiovascular:      Rate and Rhythm: Normal rate and regular rhythm  Pulses: Normal pulses        Heart sounds: Normal heart sounds  No murmur heard  Pulmonary:      Effort: Pulmonary effort is normal  No respiratory distress  Breath sounds: Normal breath sounds  Chest:      Breasts:         Right: No swelling, bleeding, inverted nipple, mass, nipple discharge, skin change or tenderness  Left: No swelling, bleeding, inverted nipple, mass, nipple discharge, skin change or tenderness  Abdominal:      General: Abdomen is flat  There is no distension  Palpations: Abdomen is soft  There is no mass  Tenderness: There is no abdominal tenderness  There is no guarding or rebound  Hernia: No hernia is present  There is no hernia in the left inguinal area or right inguinal area  Genitourinary:     General: Normal vulva  Labia:         Right: No rash, tenderness or lesion  Left: No rash, tenderness or lesion  Vagina: Normal       Cervix: Normal       Uterus: Absent  Adnexa:         Right: No mass, tenderness or fullness  Left: No mass, tenderness or fullness  Musculoskeletal:      Cervical back: Normal range of motion and neck supple  No tenderness  Lymphadenopathy:      Cervical: No cervical adenopathy  Upper Body:      Right upper body: No supraclavicular, axillary or pectoral adenopathy  Left upper body: No supraclavicular, axillary or pectoral adenopathy  Lower Body: No right inguinal adenopathy  No left inguinal adenopathy  Neurological:      Mental Status: She is alert

## 2021-10-07 LAB
LAB AP GYN PRIMARY INTERPRETATION: NORMAL
Lab: NORMAL

## 2021-12-11 ENCOUNTER — HOSPITAL ENCOUNTER (OUTPATIENT)
Dept: MAMMOGRAPHY | Facility: MEDICAL CENTER | Age: 46
Discharge: HOME | End: 2021-12-11
Payer: COMMERCIAL

## 2021-12-11 VITALS — BODY MASS INDEX: 24.33 KG/M2 | WEIGHT: 155 LBS | HEIGHT: 67 IN

## 2021-12-11 DIAGNOSIS — Z12.31 ENCOUNTER FOR SCREENING MAMMOGRAM FOR MALIGNANT NEOPLASM OF BREAST: ICD-10-CM

## 2021-12-11 PROCEDURE — 77063 BREAST TOMOSYNTHESIS BI: CPT

## 2021-12-11 PROCEDURE — 77067 SCR MAMMO BI INCL CAD: CPT

## 2022-05-03 ENCOUNTER — OFFICE VISIT (OUTPATIENT)
Dept: INTERNAL MEDICINE CLINIC | Facility: OTHER | Age: 47
End: 2022-05-03
Payer: COMMERCIAL

## 2022-05-03 VITALS
OXYGEN SATURATION: 97 % | HEIGHT: 67 IN | WEIGHT: 160.6 LBS | HEART RATE: 70 BPM | TEMPERATURE: 98.8 F | SYSTOLIC BLOOD PRESSURE: 110 MMHG | RESPIRATION RATE: 18 BRPM | BODY MASS INDEX: 25.21 KG/M2 | DIASTOLIC BLOOD PRESSURE: 84 MMHG

## 2022-05-03 DIAGNOSIS — Z12.11 SCREENING FOR COLON CANCER: ICD-10-CM

## 2022-05-03 DIAGNOSIS — Z83.71 FAMILY HISTORY OF COLONIC POLYPS: ICD-10-CM

## 2022-05-03 DIAGNOSIS — J43.9 BULLA, LUNG (HCC): ICD-10-CM

## 2022-05-03 DIAGNOSIS — D50.8 OTHER IRON DEFICIENCY ANEMIA: ICD-10-CM

## 2022-05-03 DIAGNOSIS — Z00.00 ANNUAL PHYSICAL EXAM: Primary | ICD-10-CM

## 2022-05-03 DIAGNOSIS — Z13.6 ENCOUNTER FOR LIPID SCREENING FOR CARDIOVASCULAR DISEASE: ICD-10-CM

## 2022-05-03 DIAGNOSIS — Z13.220 ENCOUNTER FOR LIPID SCREENING FOR CARDIOVASCULAR DISEASE: ICD-10-CM

## 2022-05-03 DIAGNOSIS — Z12.31 ENCOUNTER FOR SCREENING MAMMOGRAM FOR MALIGNANT NEOPLASM OF BREAST: ICD-10-CM

## 2022-05-03 PROCEDURE — 1036F TOBACCO NON-USER: CPT | Performed by: INTERNAL MEDICINE

## 2022-05-03 PROCEDURE — 99396 PREV VISIT EST AGE 40-64: CPT | Performed by: INTERNAL MEDICINE

## 2022-05-03 PROCEDURE — 3008F BODY MASS INDEX DOCD: CPT | Performed by: INTERNAL MEDICINE

## 2022-05-03 NOTE — PROGRESS NOTES
ADULT ANNUAL [address] [facility] PRIMARY CARE [place]    NAME: Haydee Zepeda  AGE: 55 y o  SEX: female  : 1975     DATE: 5/3/2022     Assessment and Plan:     Problem List Items Addressed This Visit        Other    Anemia, iron deficiency    Relevant Orders    CBC    Comprehensive metabolic panel    Iron Panel (Includes Ferritin, Iron Sat%, Iron, and TIBC)    Bulla, lung ([address] )    Annual physical exam - Primary     She is up-to-date on immunizations  Discussed diet exercise  She is up-to-date on breast cancer screening  Referral to Gastroenterology for colorectal cancer screening given today  Discussed following up with her gynecologist            Relevant Orders    Lipid panel    Encounter for screening mammogram for malignant neoplasm of breast      Other Visit Diagnoses     Family history of colonic polyps        Relevant Orders    Ambulatory Referral to Gastroenterology    Screening for colon cancer        Relevant Orders    Ambulatory Referral to Gastroenterology    Encounter for lipid screening for cardiovascular disease        Relevant Orders    Lipid panel          Immunizations and preventive care screenings were discussed with patient today  Appropriate education was printed on patient's after visit summary  Counseling:  Alcohol/drug use: discussed moderation in alcohol intake, the recommendations for healthy alcohol use, and avoidance of illicit drug use  Dental Health: discussed importance of regular tooth brushing, flossing, and dental visits  Injury prevention: discussed safety/seat belts, safety helmets, smoke detectors, carbon dioxide detectors, and smoking near bedding or upholstery  Sexual health: discussed sexually transmitted diseases, partner selection, use of condoms, avoidance of unintended pregnancy, and contraceptive alternatives  Exercise: the importance of regular exercise/physical activity was discussed  Recommend exercise 3-5 times per week for at least 30 minutes  Return for Annual physical      Chief Complaint:     Chief Complaint   Patient presents with    Physical Exam     annual    health maintenance     hep c, phq, colonoscopy family hx polyps      History of Present Illness:     Adult Annual Physical   Patient here for a comprehensive physical exam  The patient reports no problems  Diet and Physical Activity  Diet/Nutrition: well balanced diet  Exercise: walking  Depression Screening  PHQ-2/9 Depression Screening    Little interest or pleasure in doing things: 0 - not at all  Feeling down, depressed, or hopeless: 0 - not at all  PHQ-2 Score: 0  PHQ-2 Interpretation: Negative depression screen       General Health  Sleep: sleeps well and gets 7-8 hours of sleep on average  Hearing: normal - bilateral   Vision: no vision problems and wears glasses  Dental: regular dental visits, brushes teeth twice daily and flosses teeth occasionally  /GYN Health  Patient is:  Postmenopausal, status post hysterectomy (2019)       Review of Systems:     Review of Systems   Constitutional: Negative for activity change, appetite change, chills, diaphoresis, fatigue and fever  HENT: Negative for congestion, postnasal drip, rhinorrhea, sinus pressure, sinus pain, sneezing and sore throat  Eyes: Negative for visual disturbance  Respiratory: Negative for apnea, cough, choking, chest tightness, shortness of breath and wheezing  Cardiovascular: Negative for chest pain, palpitations and leg swelling  Gastrointestinal: Negative for abdominal distention, abdominal pain, anal bleeding, blood in stool, constipation, diarrhea, nausea and vomiting  Endocrine: Negative for cold intolerance and heat intolerance  Genitourinary: Negative for difficulty urinating, dysuria and hematuria  Musculoskeletal: Negative  Skin: Negative      Neurological: Negative for dizziness, weakness, light-headedness, numbness and headaches  Hematological: Negative for adenopathy  Psychiatric/Behavioral: Negative for agitation, sleep disturbance and suicidal ideas  All other systems reviewed and are negative  Past Medical History:     Past Medical History:   Diagnosis Date    Anemia     iron def    Bulla of lung ([address] )     Coccydynia     Family history of vitamin B12 deficiency     Fibroid, uterine     Hematuria     last assessed: 2013    History of hypertension     History of morbid obesity     History of vitamin B deficiency     History of vitamin D deficiency     Hypertension     Hypocalcemia     Hypocalcemia     Iron deficiency     Iron deficiency     Kyphosis deformity of spine     Menopause     Migraine     Morbid (severe) obesity due to excess calories ([address] )     per [employer]-last assessed: 2014    Narrow angle glaucoma suspect of both eyes     PONV (postoperative nausea and vomiting)     nausea only    Postgastrectomy malabsorption     Vitamin A deficiency     last assessed: 2014      Past Surgical History:     Past Surgical History:   Procedure Laterality Date    APPENDECTOMY      AUGMENTATION MAMMAPLASTY Bilateral 2018    BRAIN SURGERY  1990    AVM    BREAST BIOPSY Left 10/12/218    x2    BUNIONECTOMY Right 10/2/2019    Procedure: EXOSTECTOMY RIGHT FOOT;  Surgeon: Alfred Lua DPM;  Location: [facility] MAIN OR;  Service: Podiatry     SECTION  1996    COSMETIC SURGERY      tummy tuck,breast implants and lift    ENDOMETRIAL ABLATION  2018    HYSTERECTOMY      [place] LAP, SUPRACERVIAL HYSTERECTOMY W/ TUBE&OV, <250G N/A 2019    Procedure: HYSTERECTOMY LAPAROSCOPIC SUPRACERVICAL ([facility])  WITH B/L SALPINGECTOMY   EXCISION OF ENDOMETRIAL CYST;  Surgeon: Cathy Alfonso DO;  Location: [facility] Main [facility];  Service: Gynecology    REFRACTIVE SURGERY      glaucoma    HITESH-EN-Y PROCEDURE  2014    TONSILLECTOMY AND ADENOIDECTOMY      including adenoids    TOOTH EXTRACTION      TUBAL LIGATION      US GUIDANCE BREAST BIOPSY LEFT EACH ADDITIONAL Left 10/12/2018    US GUIDED BREAST BIOPSY LEFT COMPLETE Left 10/12/2018      Social History:     Social History     Socioeconomic History    Marital status: Single     Spouse name: None    Number of children: None    Years of education: None    Highest education level: None   Occupational History    None   Tobacco Use    Smoking status: Never Smoker    Smokeless tobacco: Never Used   Vaping Use    Vaping Use: Never used   Substance and Sexual Activity    Alcohol use:  Yes     Alcohol/week: 1 0 standard drink     Types: 1 Standard drinks or equivalent per week     Comment: social    Drug use: No    Sexual activity: Yes     Partners: Male     Comment: hysterectomy   Other Topics Concern    None   Social History Narrative    None     Social Determinants of Health     Financial Resource Strain: Not on file   Food Insecurity: Not on file   Transportation Needs: Not on file   Physical Activity: Not on file   Stress: Not on file   Social Connections: Not on file   Intimate Partner Violence: Not on file   Housing Stability: Not on file      Family History:     Family History   Problem Relation Age of Onset    Hypertension Mother     Atrial fibrillation Mother     Melanoma Mother     Cancer Mother         on left upper chest   Hollace Slim' disease Mother     Lung cancer Mother     Colon polyps Mother     Hypertension Father     Atrial fibrillation Father     Diabetes Father     Obesity Father     Allergies Father     Heart disease Father     Colon polyps Father     Diverticulitis Father     Stomach cancer Maternal Aunt     Other Maternal Aunt         GIST, malignant-per [employer]scripts    Stomach cancer Paternal Aunt     No Known Problems Maternal Grandmother     Cancer Maternal Grandfather         Lung    Lung cancer Maternal Grandfather     Stroke Paternal Grandmother     Cancer Paternal Grandfather         Lung    Lung cancer Paternal Grandfather     No Known Problems Daughter     No Known Problems Paternal Aunt     No Known Problems Paternal Aunt     No Known Problems Paternal Aunt     No Known Problems Paternal Aunt     Depression Neg Hx     Anxiety disorder Neg Hx     Drug abuse Neg Hx       Current Medications:     Current Outpatient Medications   Medication Sig Dispense Refill    butalbital-acetaminophen-caffeine (FIORICET,ESGIC) -40 mg per tablet Take 1 tablet by mouth every 4 (four) hours as needed for headaches 30 tablet 0    calcium carbonate-vitamin D (OSCAL-D) 500 mg-200 units per tablet Take 1 tablet by mouth 2 (two) times a day with meals      cyanocobalamin (VITAMIN B-12) 500 mcg tablet Take 500 mcg by mouth daily      multivitamin (THERAGRAN) TABS Take 1 tablet by mouth 2 (two) times a day       No current facility-administered medications for this visit  Allergies: Allergies   Allergen Reactions    Pollen Extract Allergic Rhinitis    Suture Other (See Comments)     Red and break open      Physical Exam:     /84 (BP Location: Left arm, Patient Position: Sitting, Cuff Size: Standard)   Pulse 70   Temp 98 8 °F (37 1 °C) (Temporal)   Resp 18   Ht 5' 7" (1 702 m)   Wt 72 8 kg (160 lb 9 6 oz) Comment: steel toe sneakers  LMP 09/09/2019   SpO2 97%   BMI 25 15 kg/m²     Physical Exam  Vitals and nursing note reviewed  Constitutional:       General: She is not in acute distress  Appearance: Normal appearance  She is normal weight  She is not ill-appearing  HENT:      Head: Normocephalic and atraumatic  Right Ear: Tympanic membrane, ear canal and external ear normal  There is no impacted cerumen  Left Ear: Tympanic membrane, ear canal and external ear normal  There is no impacted cerumen  Nose: Nose normal  No congestion or rhinorrhea        Mouth/Throat:      Mouth: Mucous membranes are moist       Pharynx: Oropharynx is clear  No oropharyngeal exudate or posterior oropharyngeal erythema  Eyes:      General: No scleral icterus  Right eye: No discharge  Left eye: No discharge  Extraocular Movements: Extraocular movements intact  Conjunctiva/sclera: Conjunctivae normal       Pupils: Pupils are equal, round, and reactive to light  Neck:      Vascular: No carotid bruit  Cardiovascular:      Rate and Rhythm: Normal rate and regular rhythm  Pulses: Normal pulses  Heart sounds: Normal heart sounds  No murmur heard  No friction rub  No gallop  Pulmonary:      Effort: Pulmonary effort is normal  No respiratory distress  Breath sounds: Normal breath sounds  No stridor  No wheezing, rhonchi or rales  Chest:      Chest wall: No tenderness  Abdominal:      General: Abdomen is flat  Bowel sounds are normal  There is no distension  Palpations: Abdomen is soft  There is no mass  Tenderness: There is no abdominal tenderness  Hernia: No hernia is present  Musculoskeletal:         General: No swelling, tenderness, deformity or signs of injury  Normal range of motion  Cervical back: Normal range of motion and neck supple  No rigidity  No muscular tenderness  Right lower leg: No edema  Left lower leg: No edema  Lymphadenopathy:      Cervical: No cervical adenopathy  Skin:     General: Skin is warm and dry  Capillary Refill: Capillary refill takes less than 2 seconds  Coloration: Skin is not jaundiced or pale  Findings: No bruising, erythema, lesion or rash  Neurological:      General: No focal deficit present  Mental Status: She is alert and oriented to person, place, and time  Mental status is at baseline  Cranial Nerves: No cranial nerve deficit  Sensory: No sensory deficit  Motor: No weakness        Coordination: Coordination normal       Gait: Gait normal       Deep Tendon Reflexes: Reflexes normal    Psychiatric: Mood and Affect: Mood normal          Behavior: Behavior normal          Thought Content:  Thought content normal          Judgment: Judgment normal           MD Hoang Cervantes 55 PRIMARY CARE [facility]

## 2022-05-03 NOTE — ASSESSMENT & PLAN NOTE
She is up-to-date on immunizations  Discussed diet exercise  She is up-to-date on breast cancer screening  Referral to Gastroenterology for colorectal cancer screening given today    Discussed following up with her gynecologist

## 2022-05-07 ENCOUNTER — APPOINTMENT (OUTPATIENT)
Dept: LAB | Facility: IMAGING CENTER | Age: 47
End: 2022-05-07
Payer: COMMERCIAL

## 2022-05-07 DIAGNOSIS — Z00.00 ANNUAL PHYSICAL EXAM: ICD-10-CM

## 2022-05-07 DIAGNOSIS — Z13.220 ENCOUNTER FOR LIPID SCREENING FOR CARDIOVASCULAR DISEASE: ICD-10-CM

## 2022-05-07 DIAGNOSIS — D50.8 OTHER IRON DEFICIENCY ANEMIA: ICD-10-CM

## 2022-05-07 DIAGNOSIS — Z13.6 ENCOUNTER FOR LIPID SCREENING FOR CARDIOVASCULAR DISEASE: ICD-10-CM

## 2022-05-07 LAB
ALBUMIN SERPL BCP-MCNC: 3.7 G/DL (ref 3.5–5)
ALP SERPL-CCNC: 77 U/L (ref 46–116)
ALT SERPL W P-5'-P-CCNC: 22 U/L (ref 12–78)
ANION GAP SERPL CALCULATED.3IONS-SCNC: 3 MMOL/L (ref 4–13)
AST SERPL W P-5'-P-CCNC: 14 U/L (ref 5–45)
BILIRUB SERPL-MCNC: 0.97 MG/DL (ref 0.2–1)
BUN SERPL-MCNC: 15 MG/DL (ref 5–25)
CALCIUM SERPL-MCNC: 9 MG/DL (ref 8.3–10.1)
CHLORIDE SERPL-SCNC: 107 MMOL/L (ref 100–108)
CHOLEST SERPL-MCNC: 169 MG/DL
CO2 SERPL-SCNC: 30 MMOL/L (ref 21–32)
CREAT SERPL-MCNC: 0.66 MG/DL (ref 0.6–1.3)
ERYTHROCYTE [DISTWIDTH] IN BLOOD BY AUTOMATED COUNT: 13.2 % (ref 11.6–15.1)
FERRITIN SERPL-MCNC: 8 NG/ML (ref 8–388)
GFR SERPL CREATININE-BSD FRML MDRD: 106 ML/MIN/1.73SQ M
GLUCOSE P FAST SERPL-MCNC: 83 MG/DL (ref 65–99)
HCT VFR BLD AUTO: 46.2 % (ref 34.8–46.1)
HDLC SERPL-MCNC: 67 MG/DL
HGB BLD-MCNC: 14.8 G/DL (ref 11.5–15.4)
IRON SATN MFR SERPL: 22 % (ref 15–50)
IRON SERPL-MCNC: 87 UG/DL (ref 50–170)
LDLC SERPL CALC-MCNC: 93 MG/DL (ref 0–100)
MCH RBC QN AUTO: 30.1 PG (ref 26.8–34.3)
MCHC RBC AUTO-ENTMCNC: 32 G/DL (ref 31.4–37.4)
MCV RBC AUTO: 94 FL (ref 82–98)
NONHDLC SERPL-MCNC: 102 MG/DL
PLATELET # BLD AUTO: 249 THOUSANDS/UL (ref 149–390)
PMV BLD AUTO: 10.8 FL (ref 8.9–12.7)
POTASSIUM SERPL-SCNC: 4 MMOL/L (ref 3.5–5.3)
PROT SERPL-MCNC: 6.9 G/DL (ref 6.4–8.2)
RBC # BLD AUTO: 4.91 MILLION/UL (ref 3.81–5.12)
SODIUM SERPL-SCNC: 140 MMOL/L (ref 136–145)
TIBC SERPL-MCNC: 401 UG/DL (ref 250–450)
TRIGL SERPL-MCNC: 43 MG/DL
WBC # BLD AUTO: 5.12 THOUSAND/UL (ref 4.31–10.16)

## 2022-05-07 PROCEDURE — 82728 ASSAY OF FERRITIN: CPT

## 2022-05-07 PROCEDURE — 36415 COLL VENOUS BLD VENIPUNCTURE: CPT

## 2022-05-07 PROCEDURE — 85027 COMPLETE CBC AUTOMATED: CPT

## 2022-05-07 PROCEDURE — 83550 IRON BINDING TEST: CPT

## 2022-05-07 PROCEDURE — 80053 COMPREHEN METABOLIC PANEL: CPT

## 2022-05-07 PROCEDURE — 80061 LIPID PANEL: CPT

## 2022-05-07 PROCEDURE — 83540 ASSAY OF IRON: CPT

## 2022-05-26 ENCOUNTER — OFFICE VISIT (OUTPATIENT)
Dept: URGENT CARE | Age: 47
End: 2022-05-26
Payer: COMMERCIAL

## 2022-05-26 VITALS
RESPIRATION RATE: 18 BRPM | OXYGEN SATURATION: 98 % | HEART RATE: 67 BPM | BODY MASS INDEX: 24.92 KG/M2 | SYSTOLIC BLOOD PRESSURE: 112 MMHG | WEIGHT: 158.8 LBS | DIASTOLIC BLOOD PRESSURE: 82 MMHG | HEIGHT: 67 IN | TEMPERATURE: 98 F

## 2022-05-26 DIAGNOSIS — J02.9 VIRAL PHARYNGITIS: Primary | ICD-10-CM

## 2022-05-26 PROCEDURE — 99213 OFFICE O/P EST LOW 20 MIN: CPT | Performed by: NURSE PRACTITIONER

## 2022-05-26 NOTE — PATIENT INSTRUCTIONS
Stop using sudafed for symptoms   Take meds as directed  Take zyrtec or allegra daily   Flonase daily as directed     May use mucinex for mucus but stop using sudafed  Offered covid testing today in the office and pt refused at this time     Pt left with verbal instructions and will see info on her Markafoni irena

## 2022-05-26 NOTE — PROGRESS NOTES
NAME: Ju Morales is a 55 y o  female  : 1975    MRN: [id number]    /82   Pulse 67   Temp 98 °F (36 7 °C)   Resp 18   Ht 5' 7" (1 702 m)   Wt 72 kg (158 lb 12 8 oz)   LMP 2019   SpO2 98%   BMI 24 87 kg/m²     Assessment and Plan   Viral pharyngitis [J02 9]  1  Viral pharyngitis         Jose Amin was seen today for cold like symptoms  Diagnoses and all orders for this visit:    Viral pharyngitis        Patient Instructions   Patient Instructions   Stop using sudafed for symptoms   Take meds as directed  Take zyrtec or allegra daily   Flonase daily as directed     May use mucinex for mucus but stop using sudafed  Offered covid testing today in the office and pt refused at this time     Pt left with verbal instructions and will see info on her [employer] irena  Proceed to the nearest ER if symptoms worsen, Follow up with your PCP  Continue to social distance, wash your hands, and wear your masks  Please continue to follow the CDC  gov guidelines daily for they are subject to change on COVID-19    Chief Complaint     Chief Complaint   Patient presents with    Cold Like Symptoms     Pt reports sore throat and PND beginning last mark  NO OTC meds taken  Pt is fully vaccinated against Covid  No home tests taken  History of Present Illness       Patient is a 71-year-old female who has had a sore throat since yesterday and postnasal drip  She started using Sudafed and symptoms have not improved  She is not taking any COVID tests and discussed with patient about taking a rapid COVID test here today and patient refused  Patient is not taking any antihistamines over-the-counter or nasal sprays at this time  She has no fevers  Symptoms present for 1 day  Review of Systems   Review of Systems   Constitutional: Negative for chills, fatigue and fever  HENT: Positive for postnasal drip and sore throat   Negative for congestion, ear pain, rhinorrhea, sinus pressure, sinus pain and sneezing  Eyes: Negative  Respiratory: Positive for cough  Negative for shortness of breath  Cardiovascular: Negative  Gastrointestinal: Negative  Genitourinary: Negative  Musculoskeletal: Negative  Skin: Negative  Psychiatric/Behavioral: Negative            Current Medications       Current Outpatient Medications:     butalbital-acetaminophen-caffeine (FIORICET,ESGIC) -40 mg per tablet, Take 1 tablet by mouth every 4 (four) hours as needed for headaches, Disp: 30 tablet, Rfl: 0    calcium carbonate-vitamin D (OSCAL-D) 500 mg-200 units per tablet, Take 1 tablet by mouth 2 (two) times a day with meals, Disp: , Rfl:     cyanocobalamin (VITAMIN B-12) 500 mcg tablet, Take 500 mcg by mouth daily, Disp: , Rfl:     multivitamin (THERAGRAN) TABS, Take 1 tablet by mouth 2 (two) times a day, Disp: , Rfl:     Current Allergies     Allergies as of 05/26/2022 - Reviewed 05/26/2022   Allergen Reaction Noted    Pollen extract Allergic Rhinitis 04/25/2019    Suture Other (See Comments) 04/14/2020              Past Medical History:   Diagnosis Date    Anemia     iron def    Bulla of lung ([address] )     Coccydynia     Family history of vitamin B12 deficiency     Fibroid, uterine     Hematuria     last assessed: 12/11/2013    History of hypertension     History of morbid obesity     History of vitamin B deficiency     History of vitamin D deficiency     Hypertension     Hypocalcemia     Hypocalcemia     Iron deficiency     Iron deficiency     Kyphosis deformity of spine     Menopause     Migraine     Morbid (severe) obesity due to excess calories ([address] )     per [employer]-last assessed: 4/11/2014    Narrow angle glaucoma suspect of both eyes     PONV (postoperative nausea and vomiting)     nausea only    Postgastrectomy malabsorption     Vitamin A deficiency     last assessed: 11/11/2014       Past Surgical History:   Procedure Laterality Date    APPENDECTOMY      AUGMENTATION MAMMAPLASTY Bilateral 12/26/2018    BRAIN SURGERY  1990    AVM    BREAST BIOPSY Left 10/12/218    x2    BUNIONECTOMY Right 10/2/2019    Procedure: EXOSTECTOMY RIGHT FOOT;  Surgeon: Paulo Cisneros DPM;  Location: [address] [place] [address] MAIN OR;  Service: [address] [place] [address]    COSMETIC SURGERY      tummy tuck,breast implants and lift    ENDOMETRIAL ABLATION  08/2018    HYSTERECTOMY  2019    [place] LAP, SUPRACERVIAL HYSTERECTOMY W/ TUBE&OV, <250G N/A 9/16/2019    Procedure: HYSTERECTOMY LAPAROSCOPIC SUPRACERVICAL [facility])  WITH B/L SALPINGECTOMY   EXCISION OF ENDOMETRIAL CYST;  Surgeon: Bri Eason DO;  Location: [facility];  Service: Gynecology    REFRACTIVE SURGERY      glaucoma    HITESH-EN-Y PROCEDURE  2014    TONSILLECTOMY AND ADENOIDECTOMY      including adenoids    TOOTH EXTRACTION      TUBAL LIGATION      US GUIDANCE BREAST BIOPSY LEFT EACH ADDITIONAL Left 10/12/2018    US GUIDED BREAST BIOPSY LEFT COMPLETE Left 10/12/2018       Family History   Problem Relation Age of Onset    Hypertension Mother     Atrial fibrillation Mother     Melanoma Mother     Cancer Mother         on left upper chest    Graves' disease Mother     Lung cancer Mother     Colon polyps Mother     Hypertension Father     Atrial fibrillation Father     Diabetes Father     Obesity Father     Allergies Father     Heart disease Father     Colon polyps Father     Diverticulitis Father     Stomach cancer Maternal Aunt     Other Maternal Aunt         GIST, malignant-per Allscripts    Stomach cancer Paternal Aunt     No Known Problems Maternal Grandmother     Cancer Maternal Grandfather         Lung    Lung cancer Maternal Grandfather     Stroke Paternal Grandmother     Cancer Paternal Grandfather         Lung    Lung cancer Paternal Grandfather     No Known Problems Daughter     No Known Problems Paternal Aunt     No Known Problems Paternal Aunt     No Known Problems Paternal Aunt     No Known Problems Paternal Aunt     Depression Neg Hx     Anxiety disorder Neg Hx     Drug abuse Neg Hx          Medications have been verified  The following portions of the patient's history were reviewed and updated as appropriate: allergies, current medications, past family history, past medical history, past social history, past surgical history and problem list     Objective   /82   Pulse 67   Temp 98 °F (36 7 °C)   Resp 18   Ht 5' 7" (1 702 m)   Wt 72 kg (158 lb 12 8 oz)   LMP 09/09/2019   SpO2 98%   BMI 24 87 kg/m²      Physical Exam     Physical Exam  Constitutional:       General: She is not in acute distress  Appearance: Normal appearance  She is well-developed  She is not ill-appearing  HENT:      Head: Normocephalic  Right Ear: Hearing, tympanic membrane, ear canal and external ear normal       Left Ear: Hearing, tympanic membrane, ear canal and external ear normal       Nose: Mucosal edema present  No congestion or rhinorrhea  Mouth/Throat:      Mouth: Mucous membranes are moist       Pharynx: Uvula midline  No oropharyngeal exudate or posterior oropharyngeal erythema  Tonsils: No tonsillar exudate  0 on the right  0 on the left  Eyes:      Conjunctiva/sclera: Conjunctivae normal       Pupils: Pupils are equal, round, and reactive to light  Neck:      Thyroid: No thyroid mass  Cardiovascular:      Rate and Rhythm: Normal rate and regular rhythm  Heart sounds: Normal heart sounds  No murmur heard  Pulmonary:      Effort: Pulmonary effort is normal  No respiratory distress  Breath sounds: Normal breath sounds  No stridor  No wheezing  Musculoskeletal:      Cervical back: Normal range of motion  No erythema  Neurological:      Mental Status: She is alert  Psychiatric:         Behavior: Behavior is cooperative  Note: Portions of this record may have been created with voice recognition software   Occasional wrong word or "sound a like" substitutions may have occurred due to the inherent limitations of voice recognition software  Please read the chart carefully and recognize, using context, where substitutions have occurred  HE Kramer

## 2022-06-13 ENCOUNTER — CONSULT (OUTPATIENT)
Dept: SURGERY | Facility: CLINIC | Age: 47
End: 2022-06-13
Payer: COMMERCIAL

## 2022-06-13 VITALS
SYSTOLIC BLOOD PRESSURE: 110 MMHG | BODY MASS INDEX: 24.92 KG/M2 | WEIGHT: 158.8 LBS | TEMPERATURE: 98.2 F | HEIGHT: 67 IN | HEART RATE: 63 BPM | DIASTOLIC BLOOD PRESSURE: 80 MMHG

## 2022-06-13 DIAGNOSIS — Z12.11 SCREENING FOR MALIGNANT NEOPLASM OF COLON: Primary | ICD-10-CM

## 2022-06-13 PROBLEM — Z12.31 ENCOUNTER FOR SCREENING MAMMOGRAM FOR MALIGNANT NEOPLASM OF BREAST: Status: RESOLVED | Noted: 2022-05-03 | Resolved: 2022-06-13

## 2022-06-13 PROBLEM — Z48.815 ENCOUNTER FOR SURGICAL AFTERCARE FOLLOWING SURGERY OF DIGESTIVE SYSTEM: Status: RESOLVED | Noted: 2020-04-14 | Resolved: 2022-06-13

## 2022-06-13 PROBLEM — Z00.00 ANNUAL PHYSICAL EXAM: Status: RESOLVED | Noted: 2021-04-27 | Resolved: 2022-06-13

## 2022-06-13 PROCEDURE — 99243 OFF/OP CNSLTJ NEW/EST LOW 30: CPT | Performed by: PHYSICIAN ASSISTANT

## 2022-06-13 PROCEDURE — 3008F BODY MASS INDEX DOCD: CPT | Performed by: PHYSICIAN ASSISTANT

## 2022-06-13 PROCEDURE — 1036F TOBACCO NON-USER: CPT | Performed by: PHYSICIAN ASSISTANT

## 2022-06-13 NOTE — PROGRESS NOTES
Assessment/Plan:   Nolberto Mooney is a 55 y  o female who is here for a: Screening Colonoscopy  Plan: Colonoscopy for: Screening for Colon Cancer      Previous Colonoscopy and  Location of polyps if any:   First colonoscopy       Preoperative Clearance: None    s/p Marilee-En-Y Gastric Bypass with Dr Maxine Davis on 3/31/2014, appendectomy, hysterectomy   ____________________________________________________    HPI:  Nolberto Mooney is a 55 y  o female who was referred for evaluation of    First Screening  Currently:     Symptoms include:  no abdominal pain, change in bowel habits, or black or bloody stools  Family history of colon cancer: None reported       Anticoagulation: none    Recent A1C:      LABS:      Lab Results   Component Value Date    WBC 5 12 05/07/2022    HGB 14 8 05/07/2022    HCT 46 2 (H) 05/07/2022    MCV 94 05/07/2022     05/07/2022     Lab Results   Component Value Date     09/10/2014    K 4 0 05/07/2022     05/07/2022    CO2 30 05/07/2022    ANIONGAP 5 09/10/2014    BUN 15 05/07/2022    CREATININE 0 66 05/07/2022    GLUCOSE 88 09/10/2014    GLUF 83 05/07/2022    CALCIUM 9 0 05/07/2022    AST 14 05/07/2022    ALT 22 05/07/2022    ALKPHOS 77 05/07/2022    PROT 6 6 09/10/2014    BILITOT 1 1 (H) 09/10/2014    EGFR 106 05/07/2022     Lab Results   Component Value Date    HGBA1C 5 3 04/25/2021     No results found for: INR, PROTIME      No orders to display         Review of Systems   Constitutional: Negative for chills, diaphoresis, fever and unexpected weight change  Respiratory: Negative for chest tightness and shortness of breath  Cardiovascular: Negative for chest pain, palpitations and leg swelling  Gastrointestinal: Negative for abdominal pain, anal bleeding, blood in stool, constipation, diarrhea, nausea, rectal pain and vomiting  Genitourinary: Negative for difficulty urinating and frequency  Skin: Negative for color change, rash and wound     Neurological: Negative for weakness and numbness  Hematological: Does not bruise/bleed easily  Psychiatric/Behavioral: Negative for confusion  The patient is not nervous/anxious  Imaging: No new pertinent imaging studies           Patient Active Problem List   Diagnosis    Bariatric surgery status    Postsurgical malabsorption    Abnormal CT of the chest    Anemia, iron deficiency    Bulla, lung (HCC)    Coccydynia    Neoplasm of skin    Adenomyosis    Exostosis of right foot    Status post laparoscopic supracervical hysterectomy    Encounter for surgical aftercare following surgery of digestive system    Fatigue    Annual physical exam    Vitamin D deficiency    Encounter for screening mammogram for malignant neoplasm of breast         Allergies:  Pollen extract and Suture      Current Outpatient Medications:     butalbital-acetaminophen-caffeine (FIORICET,ESGIC) -40 mg per tablet, Take 1 tablet by mouth every 4 (four) hours as needed for headaches, Disp: 30 tablet, Rfl: 0    calcium carbonate-vitamin D (OSCAL-D) 500 mg-200 units per tablet, Take 1 tablet by mouth 2 (two) times a day with meals, Disp: , Rfl:     cyanocobalamin (VITAMIN B-12) 500 mcg tablet, Take 500 mcg by mouth daily, Disp: , Rfl:     multivitamin (THERAGRAN) TABS, Take 1 tablet by mouth 2 (two) times a day, Disp: , Rfl:     Past Medical History:   Diagnosis Date    Anemia     iron def    Bulla of lung ([address] )     Coccydynia     Family history of vitamin B12 deficiency     Fibroid, uterine     Hematuria     last assessed: 12/11/2013    History of hypertension     History of morbid obesity     History of vitamin B deficiency     History of vitamin D deficiency     Hypertension     Hypocalcemia     Hypocalcemia     Iron deficiency     Iron deficiency     Kyphosis deformity of spine     Menopause     Migraine     Morbid (severe) obesity due to excess calories ([address] )     per [employer]-last assessed: 4/11/2014  Narrow angle glaucoma suspect of both eyes     PONV (postoperative nausea and vomiting)     nausea only    Postgastrectomy malabsorption     Vitamin A deficiency     last assessed: 2014       Past Surgical History:   Procedure Laterality Date    APPENDECTOMY      AUGMENTATION MAMMAPLASTY Bilateral 2018    BRAIN SURGERY  1990    AVM    BREAST BIOPSY Left 10/12/218    x2    BUNIONECTOMY Right 10/2/2019    Procedure: EXOSTECTOMY RIGHT FOOT;  Surgeon: Todd Rosas, DPAMANDA;  Location: [address] [place] [address] MAIN OR;  Service: Podiatry     SECTION      COSMETIC SURGERY      tummy tuck,breast implants and lift    ENDOMETRIAL ABLATION  2018    HYSTERECTOMY      [place] LAP, SUPRACERVIAL HYSTERECTOMY W/ TUBE&OV, <250G N/A 2019    Procedure: HYSTERECTOMY LAPAROSCOPIC SUPRACERVICAL ([address] [place] [address])  WITH B/L SALPINGECTOMY   EXCISION OF ENDOMETRIAL CYST;  Surgeon: Lyle Dale DO;  Location: [facility];  Service: Gynecology    REFRACTIVE SURGERY      glaucoma    HITESH-EN-Y PROCEDURE      TONSILLECTOMY AND ADENOIDECTOMY      including adenoids    TOOTH EXTRACTION      TUBAL LIGATION      US GUIDANCE BREAST BIOPSY LEFT EACH ADDITIONAL Left 10/12/2018    US GUIDED BREAST BIOPSY LEFT COMPLETE Left 10/12/2018       Family History   Problem Relation Age of Onset    Hypertension Mother     Atrial fibrillation Mother     Melanoma Mother     Cancer Mother         on left upper chest    Graves' disease Mother     Lung cancer Mother     Colon polyps Mother     Hypertension Father     Atrial fibrillation Father     Diabetes Father     Obesity Father     Allergies Father     Heart disease Father     Colon polyps Father     Diverticulitis Father     Stomach cancer Maternal Aunt     Other Maternal Aunt         GIST, malignant-per [employer]scripts    Stomach cancer Paternal Aunt     No Known Problems Maternal Grandmother     Cancer Maternal Grandfather         Lung    Lung cancer Maternal Grandfather  Stroke Paternal Grandmother     Cancer Paternal Grandfather         Lung    Lung cancer Paternal Grandfather     No Known Problems Daughter     No Known Problems Paternal Aunt     No Known Problems Paternal Aunt     No Known Problems Paternal Aunt     No Known Problems Paternal Aunt     Depression Neg Hx     Anxiety disorder Neg Hx     Drug abuse Neg Hx        Social History     Socioeconomic History    Marital status: Single     Spouse name: Not on file    Number of children: Not on file    Years of education: Not on file    Highest education level: Not on file   Occupational History    Not on file   Tobacco Use    Smoking status: Never Smoker    Smokeless tobacco: Never Used   Vaping Use    Vaping Use: Never used   Substance and Sexual Activity    Alcohol use: Yes     Alcohol/week: 1 0 standard drink     Types: 1 Standard drinks or equivalent per week     Comment: social    Drug use: No    Sexual activity: Yes     Partners: Male     Comment: hysterectomy   Other Topics Concern    Not on file   Social History Narrative    Not on file     Social Determinants of Health     Financial Resource Strain: Not on file   Food Insecurity: Not on file   Transportation Needs: Not on file   Physical Activity: Not on file   Stress: Not on file   Social Connections: Not on file   Intimate Partner Violence: Not on file   Housing Stability: Not on file       Exam:   There were no vitals filed for this visit         ______________________________________________________    Physical Exam  Vitals and nursing note reviewed  Exam conducted with a chaperone present  Constitutional:       General: She is not in acute distress  Appearance: Normal appearance  She is normal weight  She is not ill-appearing, toxic-appearing or diaphoretic  HENT:      Head: Normocephalic and atraumatic  Cardiovascular:      Rate and Rhythm: Normal rate and regular rhythm  Pulses: Normal pulses        Heart sounds: Normal heart sounds  No murmur heard  No gallop  Pulmonary:      Effort: Pulmonary effort is normal       Breath sounds: Normal breath sounds  Abdominal:      General: Abdomen is flat  There is no distension  Palpations: Abdomen is soft  There is no mass  Tenderness: There is no abdominal tenderness  There is no guarding or rebound  Hernia: No hernia is present  Musculoskeletal:         General: No swelling or tenderness  Lymphadenopathy:      Cervical: No cervical adenopathy  Skin:     General: Skin is warm and dry  Neurological:      General: No focal deficit present  Mental Status: She is alert and oriented to person, place, and time  Mental status is at baseline  Psychiatric:         Mood and Affect: Mood normal          Behavior: Behavior normal          Thought Content: Thought content normal          Judgment: Judgment normal            Informed consent for procedure was personally discussed, reviewed, and signed by Dr Brigitte Christie  Discussion by Dr Brigitte Christie was carried out regarding risks, benefits, and alternatives with the patient  Risks include but are not limited to:  bleeding, infection, and delayed wound healing or an open wound, pulmonary embolus, leaks from bowel or bile ducts or other viscus, transfusions, death  Discussed in further detail the more common complications and their rates of occurrence   was used if necessary  Patient expressed understanding of the issues discussed and wished/consented to proceed  All questions were answered by Dr Brigitte Christie  Discussion performed between patient and the provider signing below       Signature:   Miles Rollins  Date: 6/13/2022 Time: 1:29 PM

## 2022-06-16 NOTE — ANESTHESIA PREPROCEDURE EVALUATION
Review of Systems/Medical History  Patient summary reviewed  Chart reviewed  History of anesthetic complications PONV    Cardiovascular   Pulmonary  Negative pulmonary ROS        GI/Hepatic    Bariatric surgery,   Comment: S/p RNY, no GERD     Negative  ROS        Endo/Other  Negative endo/other ROS      GYN    No breast cancer        Hematology  Anemia ,     Musculoskeletal       Neurology    Headaches,   Comment: H/o narrow-angle glaucoma: had laser surgery and resolved  H/o AVM at age 15 s/p crani/cautery of AVM, no deficits Psychology   Negative psychology ROS              Physical Exam    Airway    Mallampati score: II  TM Distance: <3 FB  Neck ROM: full     Dental   Comment: Braces, molar cap,     Cardiovascular  Rhythm: regular, Rate: normal, Cardiovascular exam normal    Pulmonary  Pulmonary exam normal Breath sounds clear to auscultation,     Other Findings        Anesthesia Plan  ASA Score- 2     Anesthesia Type- general with ASA Monitors  Additional Monitors:   Airway Plan: ETT  Comment: PONV prophylaxis x 2, scope patch ordered  Plan Factors-Patient not instructed to abstain from smoking on day of procedure  Patient did not smoke on day of surgery  Induction- intravenous  Postoperative Plan-     Informed Consent- Anesthetic plan and risks discussed with patient  I personally reviewed this patient with the CRNA  Discussed and agreed on the Anesthesia Plan with the CRNA  Kala Ceja
in ASU:

## 2022-07-21 ENCOUNTER — OFFICE VISIT (OUTPATIENT)
Dept: SURGERY | Facility: CLINIC | Age: 47
End: 2022-07-21

## 2022-07-21 VITALS
DIASTOLIC BLOOD PRESSURE: 60 MMHG | SYSTOLIC BLOOD PRESSURE: 110 MMHG | HEART RATE: 80 BPM | TEMPERATURE: 97.8 F | BODY MASS INDEX: 23.74 KG/M2 | WEIGHT: 151.6 LBS

## 2022-07-21 DIAGNOSIS — Z12.11 ENCOUNTER FOR SCREENING COLONOSCOPY: Primary | ICD-10-CM

## 2022-07-21 PROCEDURE — PREOP: Performed by: PHYSICIAN ASSISTANT

## 2022-07-21 NOTE — PROGRESS NOTES
Assessment/Plan:   Derek Escobedo is a 52 y  o female who is here for a: Screening Colonoscopy  Plan: Colonoscopy for: Screening for Colon Cancer    Previous Colonoscopy and  Location of polyps if any:   First colonoscopy       Preoperative Clearance: None    s/p Marilee-En-Y Gastric Bypass with Dr Maximiliano Prado on 3/31/2014, appendectomy, hysterectomy   ____________________________________________________    HPI:  Derek Escobedo is a 52 y  o female who was referred for evaluation of    First Screening  Currently:     Symptoms include:  no abdominal pain, change in bowel habits, or black or bloody stools  Family history of colon cancer: None reported       Anticoagulation: none    Recent A1C:      LABS:      Lab Results   Component Value Date    WBC 5 12 05/07/2022    HGB 14 8 05/07/2022    HCT 46 2 (H) 05/07/2022    MCV 94 05/07/2022     05/07/2022     Lab Results   Component Value Date     09/10/2014    K 4 0 05/07/2022     05/07/2022    CO2 30 05/07/2022    ANIONGAP 5 09/10/2014    BUN 15 05/07/2022    CREATININE 0 66 05/07/2022    GLUCOSE 88 09/10/2014    GLUF 83 05/07/2022    CALCIUM 9 0 05/07/2022    AST 14 05/07/2022    ALT 22 05/07/2022    ALKPHOS 77 05/07/2022    PROT 6 6 09/10/2014    BILITOT 1 1 (H) 09/10/2014    EGFR 106 05/07/2022     Lab Results   Component Value Date    HGBA1C 5 3 04/25/2021     No results found for: INR, PROTIME      No orders to display         Review of Systems   Constitutional: Negative for chills, diaphoresis, fever and unexpected weight change  Respiratory: Negative for chest tightness and shortness of breath  Cardiovascular: Negative for chest pain, palpitations and leg swelling  Gastrointestinal: Negative for abdominal pain, anal bleeding, blood in stool, constipation, diarrhea, nausea, rectal pain and vomiting  Genitourinary: Negative for difficulty urinating and frequency  Skin: Negative for color change, rash and wound     Neurological: Negative for weakness and numbness  Hematological: Does not bruise/bleed easily  Psychiatric/Behavioral: Negative for confusion  The patient is not nervous/anxious  Imaging: No new pertinent imaging studies  Patient Active Problem List   Diagnosis    Bariatric surgery status    Postsurgical malabsorption    Abnormal CT of the chest    Anemia, iron deficiency    Bulla, lung (HCC)    Coccydynia    Neoplasm of skin    Adenomyosis    Exostosis of right foot    Status post laparoscopic supracervical hysterectomy    Fatigue    Vitamin D deficiency         Allergies:  Pollen extract and Suture    No current outpatient medications on file      Past Medical History:   Diagnosis Date    Anemia     iron def    Bulla of lung ([address] )     Coccydynia     Family history of vitamin B12 deficiency     Fibroid, uterine     Hematuria     last assessed: 12/11/2013    History of hypertension     History of morbid obesity     History of vitamin B deficiency     History of vitamin D deficiency     Hypertension     Hypocalcemia     Hypocalcemia     Iron deficiency     Iron deficiency     Kyphosis deformity of spine     Menopause     Migraine     Morbid (severe) obesity due to excess calories ([address] )     per [employer]-last assessed: 4/11/2014    Narrow angle glaucoma suspect of both eyes     PONV (postoperative nausea and vomiting)     nausea only    Postgastrectomy malabsorption     Vitamin A deficiency     last assessed: 11/11/2014       Past Surgical History:   Procedure Laterality Date    APPENDECTOMY      AUGMENTATION MAMMAPLASTY Bilateral 12/26/2018    BRAIN SURGERY  1990    AVM    BREAST BIOPSY Left 10/12/218    x2    BUNIONECTOMY Right 10/2/2019    Procedure: EXOSTECTOMY RIGHT FOOT;  Surgeon: Brittani Hernandez DPM;  Location: [address] [place] [address];  Service: Podiatry   [place] [id number]    COSMETIC SURGERY      tummy tuck,breast implants and lift    ENDOMETRIAL ABLATION  08/2018  HYSTERECTOMY  2019    [place] LAP, SUPRACERVIAL HYSTERECTOMY W/ TUBE&OV, <250G N/A 9/16/2019    Procedure: HYSTERECTOMY LAPAROSCOPIC SUPRACERVICAL [facility])  WITH B/L SALPINGECTOMY   EXCISION OF ENDOMETRIAL CYST;  Surgeon: Merline Soriano DO;  [facility];  Service: Gynecology    REFRACTIVE SURGERY      glaucoma    HITESH-EN-Y PROCEDURE  2014    TONSILLECTOMY AND ADENOIDECTOMY      including adenoids    TOOTH EXTRACTION      TUBAL LIGATION      US GUIDANCE BREAST BIOPSY LEFT EACH ADDITIONAL Left 10/12/2018    US GUIDED BREAST BIOPSY LEFT COMPLETE Left 10/12/2018       Family History   Problem Relation Age of Onset    Hypertension Mother     Atrial fibrillation Mother     Melanoma Mother     Cancer Mother         on left upper chest    Graves' disease Mother     Lung cancer Mother     Colon polyps Mother     Hypertension Father     Atrial fibrillation Father     Diabetes Father     Obesity Father     Allergies Father     Heart disease Father     Colon polyps Father     Diverticulitis Father     Stomach cancer Maternal Aunt     Other Maternal Aunt         GIST, malignant-per [employer]    Stomach cancer Paternal Aunt     No Known Problems Maternal Grandmother     Cancer Maternal Grandfather         Lung    Lung cancer Maternal Grandfather     Stroke Paternal Grandmother     Cancer Paternal Grandfather         Lung    Lung cancer Paternal Grandfather     No Known Problems Daughter     No Known Problems Paternal Aunt     No Known Problems Paternal Aunt     No Known Problems Paternal Aunt     No Known Problems Paternal Aunt     Depression Neg Hx     Anxiety disorder Neg Hx     Drug abuse Neg Hx        Social History     Socioeconomic History    Marital status: Single     Spouse name: None    Number of children: None    Years of education: None    Highest education level: None   Occupational History    None   Tobacco Use    Smoking status: Never Smoker    Smokeless tobacco: Never Used   Vaping Use    Vaping Use: Never used   Substance and Sexual Activity    Alcohol use: Yes     Alcohol/week: 1 0 standard drink     Types: 1 Standard drinks or equivalent per week     Comment: social    Drug use: No    Sexual activity: Yes     Partners: Male     Comment: hysterectomy   Other Topics Concern    None   Social History Narrative    None     Social Determinants of Health     Financial Resource Strain: Not on file   Food Insecurity: Not on file   Transportation Needs: Not on file   Physical Activity: Not on file   Stress: Not on file   Social Connections: Not on file   Intimate Partner Violence: Not on file   Housing Stability: Not on file       Exam:   Vitals:    07/21/22 1455   BP: 110/60   Pulse: 80   Temp: 97 8 °F (36 6 °C)           ______________________________________________________    Physical Exam  Vitals and nursing note reviewed  Exam conducted with a chaperone present  Constitutional:       General: She is not in acute distress  Appearance: Normal appearance  She is normal weight  She is not ill-appearing, toxic-appearing or diaphoretic  HENT:      Head: Normocephalic and atraumatic  Cardiovascular:      Rate and Rhythm: Normal rate and regular rhythm  Pulses: Normal pulses  Heart sounds: Normal heart sounds  No murmur heard  No gallop  Pulmonary:      Effort: Pulmonary effort is normal       Breath sounds: Normal breath sounds  Abdominal:      General: Abdomen is flat  There is no distension  Palpations: Abdomen is soft  There is no mass  Tenderness: There is no abdominal tenderness  There is no guarding or rebound  Hernia: No hernia is present  Musculoskeletal:         General: No swelling or tenderness  Lymphadenopathy:      Cervical: No cervical adenopathy  Skin:     General: Skin is warm and dry  Neurological:      General: No focal deficit present        Mental Status: She is alert and oriented to person, place, and time  Mental status is at baseline  Psychiatric:         Mood and Affect: Mood normal          Behavior: Behavior normal          Thought Content: Thought content normal          Judgment: Judgment normal            Informed consent for procedure was personally discussed, reviewed, and signed by Dr Marni Cardona  Discussion by Dr Marni Cardona was carried out regarding risks, benefits, and alternatives with the patient  Risks include but are not limited to:  bleeding, infection, and delayed wound healing or an open wound, pulmonary embolus, leaks from bowel or bile ducts or other viscus, transfusions, death  Discussed in further detail the more common complications and their rates of occurrence   was used if necessary  Patient expressed understanding of the issues discussed and wished/consented to proceed  All questions were answered by Dr Marni Cardona  Discussion performed between patient and the provider signing below       Signature:   Miles Crespo  Date: 7/21/2022 Time: 3:03 PM

## 2022-07-21 NOTE — H&P (VIEW-ONLY)
Assessment/Plan:   Radha Eastman is a 52 y  o female who is here for a: Screening Colonoscopy  Plan: Colonoscopy for: Screening for Colon Cancer    Previous Colonoscopy and  Location of polyps if any:   First colonoscopy       Preoperative Clearance: None    s/p Marilee-En-Y Gastric Bypass with Dr Dimitrios Couch on 3/31/2014, appendectomy, hysterectomy   ____________________________________________________    HPI:  Radha Eastman is a 52 y  o female who was referred for evaluation of    First Screening  Currently:     Symptoms include:  no abdominal pain, change in bowel habits, or black or bloody stools  Family history of colon cancer: None reported       Anticoagulation: none    Recent A1C:      LABS:      Lab Results   Component Value Date    WBC 5 12 05/07/2022    HGB 14 8 05/07/2022    HCT 46 2 (H) 05/07/2022    MCV 94 05/07/2022     05/07/2022     Lab Results   Component Value Date     09/10/2014    K 4 0 05/07/2022     05/07/2022    CO2 30 05/07/2022    ANIONGAP 5 09/10/2014    BUN 15 05/07/2022    CREATININE 0 66 05/07/2022    GLUCOSE 88 09/10/2014    GLUF 83 05/07/2022    CALCIUM 9 0 05/07/2022    AST 14 05/07/2022    ALT 22 05/07/2022    ALKPHOS 77 05/07/2022    PROT 6 6 09/10/2014    BILITOT 1 1 (H) 09/10/2014    EGFR 106 05/07/2022     Lab Results   Component Value Date    HGBA1C 5 3 04/25/2021     No results found for: INR, PROTIME      No orders to display         Review of Systems   Constitutional: Negative for chills, diaphoresis, fever and unexpected weight change  Respiratory: Negative for chest tightness and shortness of breath  Cardiovascular: Negative for chest pain, palpitations and leg swelling  Gastrointestinal: Negative for abdominal pain, anal bleeding, blood in stool, constipation, diarrhea, nausea, rectal pain and vomiting  Genitourinary: Negative for difficulty urinating and frequency  Skin: Negative for color change, rash and wound     Neurological: Negative for weakness and numbness  Hematological: Does not bruise/bleed easily  Psychiatric/Behavioral: Negative for confusion  The patient is not nervous/anxious  Imaging: No new pertinent imaging studies  Patient Active Problem List   Diagnosis    Bariatric surgery status    Postsurgical malabsorption    Abnormal CT of the chest    Anemia, iron deficiency    Bulla, lung (HCC)    Coccydynia    Neoplasm of skin    Adenomyosis    Exostosis of right foot    Status post laparoscopic supracervical hysterectomy    Fatigue    Vitamin D deficiency         Allergies:  Pollen extract and Suture    No current outpatient medications on file      Past Medical History:   Diagnosis Date    Anemia     iron def    Bulla of lung ([address] )     Coccydynia     Family history of vitamin B12 deficiency     Fibroid, uterine     Hematuria     last assessed: 12/11/2013    History of hypertension     History of morbid obesity     History of vitamin B deficiency     History of vitamin D deficiency     Hypertension     Hypocalcemia     Hypocalcemia     Iron deficiency     Iron deficiency     Kyphosis deformity of spine     Menopause     Migraine     Morbid (severe) obesity due to excess calories ([address] )     per [employer]-last assessed: 4/11/2014    Narrow angle glaucoma suspect of both eyes     PONV (postoperative nausea and vomiting)     nausea only    Postgastrectomy malabsorption     Vitamin A deficiency     last assessed: 11/11/2014       Past Surgical History:   Procedure Laterality Date    APPENDECTOMY      AUGMENTATION MAMMAPLASTY Bilateral 12/26/2018    BRAIN SURGERY  1990    AVM    BREAST BIOPSY Left 10/12/218    x2    BUNIONECTOMY Right 10/2/2019    Procedure: EXOSTECTOMY RIGHT FOOT;  Surgeon: Luanne Acharya DPM;  Location: [address] [place] [address];  Service: Podiatry   [place] [address]    COSMETIC SURGERY      tummy tuck,breast implants and lift    ENDOMETRIAL ABLATION  08/2018  HYSTERECTOMY  2019    [place] LAP, SUPRACERVIAL HYSTERECTOMY W/ TUBE&OV, <250G N/A 9/16/2019    Procedure: HYSTERECTOMY LAPAROSCOPIC SUPRACERVICAL [facility])  WITH B/L SALPINGECTOMY   EXCISION OF ENDOMETRIAL CYST;  Surgeon: Juanpablo Brown DO;  [facility];  Service: Gynecology    REFRACTIVE SURGERY      glaucoma    HITESH-EN-Y PROCEDURE  2014    TONSILLECTOMY AND ADENOIDECTOMY      including adenoids    TOOTH EXTRACTION      TUBAL LIGATION      US GUIDANCE BREAST BIOPSY LEFT EACH ADDITIONAL Left 10/12/2018    US GUIDED BREAST BIOPSY LEFT COMPLETE Left 10/12/2018       Family History   Problem Relation Age of Onset    Hypertension Mother     Atrial fibrillation Mother     Melanoma Mother     Cancer Mother         on left upper chest    Graves' disease Mother     Lung cancer Mother     Colon polyps Mother     Hypertension Father     Atrial fibrillation Father     Diabetes Father     Obesity Father     Allergies Father     Heart disease Father     Colon polyps Father     Diverticulitis Father     Stomach cancer Maternal Aunt     Other Maternal Aunt         GIST, malignant-per [employer]scripts    Stomach cancer Paternal Aunt     No Known Problems Maternal Grandmother     Cancer Maternal Grandfather         Lung    Lung cancer Maternal Grandfather     Stroke Paternal Grandmother     Cancer Paternal Grandfather         Lung    Lung cancer Paternal Grandfather     No Known Problems Daughter     No Known Problems Paternal Aunt     No Known Problems Paternal Aunt     No Known Problems Paternal Aunt     No Known Problems Paternal Aunt     Depression Neg Hx     Anxiety disorder Neg Hx     Drug abuse Neg Hx        Social History     Socioeconomic History    Marital status: Single     Spouse name: None    Number of children: None    Years of education: None    Highest education level: None   Occupational History    None   Tobacco Use    Smoking status: Never Smoker    Smokeless tobacco: Never Used   Vaping Use    Vaping Use: Never used   Substance and Sexual Activity    Alcohol use: Yes     Alcohol/week: 1 0 standard drink     Types: 1 Standard drinks or equivalent per week     Comment: social    Drug use: No    Sexual activity: Yes     Partners: Male     Comment: hysterectomy   Other Topics Concern    None   Social History Narrative    None     Social Determinants of Health     Financial Resource Strain: Not on file   Food Insecurity: Not on file   Transportation Needs: Not on file   Physical Activity: Not on file   Stress: Not on file   Social Connections: Not on file   Intimate Partner Violence: Not on file   Housing Stability: Not on file       Exam:   Vitals:    07/21/22 1455   BP: 110/60   Pulse: 80   Temp: 97 8 °F (36 6 °C)           ______________________________________________________    Physical Exam  Vitals and nursing note reviewed  Exam conducted with a chaperone present  Constitutional:       General: She is not in acute distress  Appearance: Normal appearance  She is normal weight  She is not ill-appearing, toxic-appearing or diaphoretic  HENT:      Head: Normocephalic and atraumatic  Cardiovascular:      Rate and Rhythm: Normal rate and regular rhythm  Pulses: Normal pulses  Heart sounds: Normal heart sounds  No murmur heard  No gallop  Pulmonary:      Effort: Pulmonary effort is normal       Breath sounds: Normal breath sounds  Abdominal:      General: Abdomen is flat  There is no distension  Palpations: Abdomen is soft  There is no mass  Tenderness: There is no abdominal tenderness  There is no guarding or rebound  Hernia: No hernia is present  Musculoskeletal:         General: No swelling or tenderness  Lymphadenopathy:      Cervical: No cervical adenopathy  Skin:     General: Skin is warm and dry  Neurological:      General: No focal deficit present        Mental Status: She is alert and oriented to person, place, and time  Mental status is at baseline  Psychiatric:         Mood and Affect: Mood normal          Behavior: Behavior normal          Thought Content: Thought content normal          Judgment: Judgment normal            Informed consent for procedure was personally discussed, reviewed, and signed by Dr Sudeep Rodriguez  Discussion by Dr Sudeep Rodriguez was carried out regarding risks, benefits, and alternatives with the patient  Risks include but are not limited to:  bleeding, infection, and delayed wound healing or an open wound, pulmonary embolus, leaks from bowel or bile ducts or other viscus, transfusions, death  Discussed in further detail the more common complications and their rates of occurrence   was used if necessary  Patient expressed understanding of the issues discussed and wished/consented to proceed  All questions were answered by Dr Sudeep Rodriguez  Discussion performed between patient and the provider signing below       Signature:   Elna Fother[place]ill, [place]  Date: 7/21/2022 Time: 3:03 PM

## 2022-07-22 ENCOUNTER — TELEPHONE (OUTPATIENT)
Dept: SURGERY | Facility: CLINIC | Age: 47
End: 2022-07-22

## 2022-07-24 RX ORDER — SODIUM CHLORIDE 9 MG/ML
125 INJECTION, SOLUTION INTRAVENOUS CONTINUOUS
Status: CANCELLED | OUTPATIENT
Start: 2022-07-24

## 2022-07-26 ENCOUNTER — ANESTHESIA (OUTPATIENT)
Dept: GASTROENTEROLOGY | Facility: HOSPITAL | Age: 47
End: 2022-07-26

## 2022-07-26 ENCOUNTER — HOSPITAL ENCOUNTER (OUTPATIENT)
Dept: GASTROENTEROLOGY | Facility: HOSPITAL | Age: 47
Setting detail: OUTPATIENT SURGERY
Discharge: HOME | End: 2022-07-26
Attending: SURGERY
Payer: COMMERCIAL

## 2022-07-26 ENCOUNTER — ANESTHESIA EVENT (OUTPATIENT)
Dept: GASTROENTEROLOGY | Facility: HOSPITAL | Age: 47
End: 2022-07-26

## 2022-07-26 VITALS
SYSTOLIC BLOOD PRESSURE: 98 MMHG | WEIGHT: 151 LBS | DIASTOLIC BLOOD PRESSURE: 61 MMHG | BODY MASS INDEX: 23.7 KG/M2 | HEIGHT: 67 IN | RESPIRATION RATE: 17 BRPM | TEMPERATURE: 99.4 F | HEART RATE: 55 BPM | OXYGEN SATURATION: 98 %

## 2022-07-26 DIAGNOSIS — Z12.11 SCREENING FOR MALIGNANT NEOPLASM OF COLON: ICD-10-CM

## 2022-07-26 PROCEDURE — 45380 COLONOSCOPY AND BIOPSY: CPT | Performed by: SURGERY

## 2022-07-26 PROCEDURE — 88305 TISSUE EXAM BY PATHOLOGIST: CPT | Performed by: PATHOLOGY

## 2022-07-26 RX ORDER — SODIUM CHLORIDE 9 MG/ML
125 INJECTION, SOLUTION INTRAVENOUS CONTINUOUS
Status: DISCONTINUED | OUTPATIENT
Start: 2022-07-26 | End: 2022-07-30 | Stop reason: HOSPADM

## 2022-07-26 RX ORDER — PROPOFOL 10 MG/ML
INJECTION, EMULSION INTRAVENOUS CONTINUOUS PRN
Status: DISCONTINUED | OUTPATIENT
Start: 2022-07-26 | End: 2022-07-26

## 2022-07-26 RX ORDER — PROPOFOL 10 MG/ML
INJECTION, EMULSION INTRAVENOUS AS NEEDED
Status: DISCONTINUED | OUTPATIENT
Start: 2022-07-26 | End: 2022-07-26

## 2022-07-26 RX ORDER — LIDOCAINE HYDROCHLORIDE 20 MG/ML
INJECTION, SOLUTION EPIDURAL; INFILTRATION; INTRACAUDAL; PERINEURAL AS NEEDED
Status: DISCONTINUED | OUTPATIENT
Start: 2022-07-26 | End: 2022-07-26

## 2022-07-26 RX ADMIN — SODIUM CHLORIDE 125 ML/HR: 0.9 INJECTION, SOLUTION INTRAVENOUS at 07:17

## 2022-07-26 RX ADMIN — PROPOFOL 100 MG: 10 INJECTION, EMULSION INTRAVENOUS at 07:37

## 2022-07-26 RX ADMIN — PROPOFOL 120 MCG/KG/MIN: 10 INJECTION, EMULSION INTRAVENOUS at 07:37

## 2022-07-26 RX ADMIN — PROPOFOL 30 MG: 10 INJECTION, EMULSION INTRAVENOUS at 07:50

## 2022-07-26 RX ADMIN — LIDOCAINE HYDROCHLORIDE 100 MG: 20 INJECTION, SOLUTION EPIDURAL; INFILTRATION; INTRACAUDAL; PERINEURAL at 07:37

## 2022-07-26 NOTE — INTERVAL H&P NOTE
H&P reviewed  After examining the patient I find no changes in the patients condition since the H&P had been written      Vitals:    07/26/22 0710   BP: 133/82   Pulse: 74   Resp: 16   Temp: 99 4 °F (37 4 °C)   SpO2: 97%

## 2022-07-26 NOTE — ANESTHESIA POSTPROCEDURE EVALUATION
Post-Op Assessment Note    CV Status:  Stable    Pain management: adequate     Mental Status:  Alert and awake   Hydration Status:  Euvolemic   PONV Controlled:  Controlled   Airway Patency:  Patent      Post Op Vitals Reviewed: Yes      Staff: Anesthesiologist, CRNA         No complications documented      BP      Temp      Pulse     Resp      SpO2      BP 98/61   Pulse 55   Temp 99 4 °F (37 4 °C) (Temporal)   Resp 17   Ht 5' 7" (1 702 m)   Wt 68 5 kg (151 lb)   LMP 09/09/2019   SpO2 98%   BMI 23 65 kg/m²

## 2022-07-26 NOTE — ANESTHESIA PREPROCEDURE EVALUATION
Procedure:  COLONOSCOPY    Relevant Problems   ANESTHESIA   (+) PONV (postoperative nausea and vomiting)      GI/HEPATIC   (+) Bariatric surgery status      GYN   (+) Status post laparoscopic supracervical hysterectomy      HEMATOLOGY   (+) Anemia, iron deficiency      MUSCULOSKELETAL   (+) Coccydynia        Physical Exam    Airway    Mallampati score: II  TM Distance: >3 FB  Neck ROM: full     Dental   No notable dental hx     Cardiovascular  Rhythm: regular, Rate: normal, Cardiovascular exam normal    Pulmonary  Pulmonary exam normal Breath sounds clear to auscultation,     Other Findings        Anesthesia Plan  ASA Score- 2     Anesthesia Type- IV sedation with anesthesia with ASA Monitors  Additional Monitors:   Airway Plan:     Comment: GA prn  Plan Factors-    Chart reviewed  Existing labs reviewed  Patient summary reviewed  Patient is not a current smoker  Patient not instructed to abstain from smoking on day of procedure  Patient did not smoke on day of surgery  Induction- intravenous  Postoperative Plan-     Informed Consent- Anesthetic plan and risks discussed with patient Mohinder Kwon

## 2022-07-26 NOTE — DISCHARGE INSTRUCTIONS
[facility]  Endoscopy Post-Operative Instructions  Dr Clifton Selby MD, FACS    Procedure: Colonoscopy    Findings:  Diverticulosis and Colon Polyp(s)    Follow-Up: You will need a repeat Endoscopy in (generally)5 years  Will await final pathology report for final determination of number of years until your follow up endoscopy, if you had polyps on this exam   Different types of polyps require different lengths of follow up surveillance  Please call our office or your primary doctor's office if you have any questions, once the report is returned  You should have an endoscopy sooner than recommended if you have any symptoms of bleeding or change in stools or other concerns  You will receive a call from our office with your results, in addition to the the preliminary results you received today  You will usually receive a follow-up letter from our office in 1-2 weeks  Call the office if you do not hear from us  You are welcome to also schedule an office visit if desired to discuss the results further  It is your responsibility to contact our office for results in 1- 2 weeks if you do not hear from us  If a follow up endoscopy is needed, you are responsible for arranging that follow up appointment at the appropriate time  The office may or may not issue a reminder at that future time  Please take responsibility for your own follow up healthcare  Diet: Eat a light snack first, and then resume your previous diet  Call the office if you have unusual fevers, chills, nausea or vomiting or abdominal pain  Report to the emergency room with these are severe in nature  Activity: Do not drive a car, operate machinery, or sign legal documents for 24 hours after your procedure  Normal activity may be resumed on the day following the procedure       Call the office at [phone] for any of the following: Severe abdominal pain, significant rectal bleeding, chills, or fever above 100°, new onset of persistent cough or persistent vomiting       [address], [address]  [facility], [address]   Phone: [phone]

## 2022-07-27 ENCOUNTER — TELEPHONE (OUTPATIENT)
Dept: SURGERY | Facility: CLINIC | Age: 47
End: 2022-07-27

## 2022-07-27 NOTE — TELEPHONE ENCOUNTER
S/P = COLONOSCOPY = 7/26/2022    Unable to reach patient, left message asking for call back  Path pending

## 2022-07-28 NOTE — TELEPHONE ENCOUNTER
Spoke with the patient, she stated she is doing fine - no problems, concerns, or questions  She is aware a polyp was found, removed and sent for pathology  I will call with the results as well as the official follow up time  She is also aware the follow up time is about 3 weeks for pathology to come back  Path pending

## 2022-08-30 ENCOUNTER — HOSPITAL ENCOUNTER (OUTPATIENT)
Dept: RADIOLOGY | Facility: IMAGING CENTER | Age: 47
Discharge: HOME | End: 2022-08-30
Payer: COMMERCIAL

## 2022-08-30 DIAGNOSIS — M79.672 LEFT FOOT PAIN: ICD-10-CM

## 2022-08-30 PROCEDURE — 73630 X-RAY EXAM OF FOOT: CPT

## 2022-11-01 ENCOUNTER — ANNUAL EXAM (OUTPATIENT)
Dept: GYNECOLOGY | Facility: CLINIC | Age: 47
End: 2022-11-01

## 2022-11-01 VITALS
HEIGHT: 68 IN | BODY MASS INDEX: 23.04 KG/M2 | DIASTOLIC BLOOD PRESSURE: 70 MMHG | HEART RATE: 75 BPM | SYSTOLIC BLOOD PRESSURE: 110 MMHG | WEIGHT: 152 LBS

## 2022-11-01 DIAGNOSIS — Z12.31 ENCOUNTER FOR SCREENING MAMMOGRAM FOR MALIGNANT NEOPLASM OF BREAST: Primary | ICD-10-CM

## 2022-11-01 DIAGNOSIS — Z01.419 ENCOUNTER FOR GYNECOLOGICAL EXAMINATION WITHOUT ABNORMAL FINDING: Primary | ICD-10-CM

## 2022-11-01 NOTE — PROGRESS NOTES
Assessment/Plan:         Diagnoses and all orders for this visit:    Encounter for gynecological examination without abnormal finding  -     Liquid-based pap, screening        Subjective:      Patient ID: Ismael James is a 52 y o  female  HPI patient presents for annual examination  She offers no complaints  Status post [facility] BS in September 2019 secondary to endometriosis/adenomyosis/leiomyomata uteri  She denies any vaginal irritation, burning, discharge or bleeding  Denies any dysuria, hematuria urgency or urinary incontinence  No GI complaints  Colonoscopy July of 2022  Polyp identified  Advised to repeat in 7 years  The following portions of the patient's history were reviewed and updated as appropriate:   She  has a past medical history of Anemia, Bulla of lung ([facility] ), Coccydynia, Family history of vitamin B12 deficiency, Fibroid, uterine, Hematuria, History of hypertension, History of morbid obesity, History of vitamin B deficiency, History of vitamin D deficiency, Hypertension, Hypocalcemia, Hypocalcemia, Iron deficiency, Iron deficiency, Kyphosis deformity of spine, Menopause, Migraine, Morbid (severe) obesity due to excess calories (HCC), Narrow angle glaucoma suspect of both eyes, PONV (postoperative nausea and vomiting), Postgastrectomy malabsorption, and Vitamin A deficiency    She   Patient Active Problem List    Diagnosis Date Noted   • PONV (postoperative nausea and vomiting)    • Vitamin D deficiency 04/27/2021   • Fatigue 04/14/2020   • Status post laparoscopic supracervical hysterectomy 09/16/2019   • Exostosis of right foot 09/11/2019   • Adenomyosis 08/28/2019   • Bariatric surgery status 08/28/2018   • Postsurgical malabsorption 08/28/2018   • Abnormal CT of the chest 06/23/2017   • Bulla, lung ([facility] [address] ) 06/23/2017   • Anemia, iron deficiency 05/30/2017   • Coccydynia 06/07/2016   • Neoplasm of skin 02/25/2014     She  has a past surgical history that includes Marilee-en-y procedure ();  section (); Brain surgery (); Appendectomy; Endometrial ablation (2018); US guided breast biopsy left complete (Left, 10/12/2018); US guidance breast biopsy left each additional (Left, 10/12/2018); Cosmetic surgery; Refractive surgery; Tooth extraction; Tonsillectomy and adenoidectomy; Tubal ligation; pr lap, supracervial hysterectomy w/ tube&ov, <250g (N/A, 2019); Bunionectomy (Right, 10/2/2019); Breast biopsy (Left, 10/12/218); Augmentation mammaplasty (Bilateral, 2018); and Hysterectomy ()  Her family history includes Allergies in her father; Atrial fibrillation in her father and mother; Cancer in her maternal grandfather, mother, and paternal grandfather; Colon polyps in her father and mother; Diabetes in her father; Diverticulitis in her father; Ocie Nearing' disease in her mother; Heart disease in her father; Hypertension in her father and mother; Lung cancer in her maternal grandfather, mother, and paternal grandfather; Melanoma in her mother; No Known Problems in her daughter, maternal grandmother, paternal aunt, paternal aunt, paternal aunt, and paternal aunt; Obesity in her father; Other in her maternal aunt; Stomach cancer in her maternal aunt and paternal aunt; Stroke in her paternal grandmother  She  reports that she has never smoked  She has never used smokeless tobacco  She reports current alcohol use of about 1 0 standard drink of alcohol per week  She reports that she does not use drugs  No current outpatient medications on file  No current facility-administered medications for this visit  No current outpatient medications on file prior to visit  No current facility-administered medications on file prior to visit  She is allergic to pollen extract and suture       Review of Systems   Constitutional: Negative  HENT: Negative for sore throat and trouble swallowing  Gastrointestinal: Negative  Genitourinary: Negative  Objective:      /70   Pulse 75   Ht 5' 7 5" (1 715 m)   Wt 68 9 kg (152 lb)   LMP 09/09/2019   BMI 23 46 kg/m²          Physical Exam  Vitals reviewed  Cardiovascular:      Rate and Rhythm: Normal rate and regular rhythm  Pulses: Normal pulses  Heart sounds: Normal heart sounds  No murmur heard  Pulmonary:      Effort: Pulmonary effort is normal  No respiratory distress  Breath sounds: Normal breath sounds  Chest:   Breasts:      Right: No swelling, bleeding, inverted nipple, mass, nipple discharge, skin change, tenderness, axillary adenopathy or supraclavicular adenopathy  Left: No swelling, bleeding, inverted nipple, mass, nipple discharge, skin change, tenderness, axillary adenopathy or supraclavicular adenopathy  Abdominal:      General: There is no distension  Palpations: Abdomen is soft  There is no mass  Tenderness: There is no abdominal tenderness  There is no guarding or rebound  Hernia: No hernia is present  There is no hernia in the left inguinal area or right inguinal area  Genitourinary:     General: Normal vulva  Labia:         Right: No rash, tenderness or lesion  Left: No rash, tenderness or lesion  Vagina: Normal       Cervix: Normal       Uterus: Absent  Adnexa:         Right: No mass, tenderness or fullness  Left: No mass, tenderness or fullness  Musculoskeletal:      Cervical back: Normal range of motion and neck supple  No tenderness  Lymphadenopathy:      Cervical: No cervical adenopathy  Upper Body:      Right upper body: No supraclavicular, axillary or pectoral adenopathy  Left upper body: No supraclavicular, axillary or pectoral adenopathy  Lower Body: No right inguinal adenopathy  No left inguinal adenopathy  Neurological:      Mental Status: She is alert

## 2022-11-09 LAB
LAB AP GYN PRIMARY INTERPRETATION: NORMAL
Lab: NORMAL

## 2022-11-20 ENCOUNTER — OFFICE VISIT (OUTPATIENT)
Dept: URGENT CARE | Age: 47
End: 2022-11-20

## 2022-11-20 VITALS
WEIGHT: 155 LBS | HEIGHT: 67 IN | OXYGEN SATURATION: 99 % | HEART RATE: 88 BPM | BODY MASS INDEX: 24.33 KG/M2 | RESPIRATION RATE: 20 BRPM | TEMPERATURE: 98.3 F

## 2022-11-20 DIAGNOSIS — U07.1 COVID: ICD-10-CM

## 2022-11-20 DIAGNOSIS — J06.9 UPPER RESPIRATORY TRACT INFECTION, UNSPECIFIED TYPE: Primary | ICD-10-CM

## 2022-11-20 LAB
SARS-COV-2 AG UPPER RESP QL IA: POSITIVE
VALID CONTROL: ABNORMAL

## 2022-11-20 RX ORDER — FLUTICASONE PROPIONATE 50 MCG
1 SPRAY, SUSPENSION (ML) NASAL DAILY
Qty: 16 G | Refills: 0 | Status: SHIPPED | OUTPATIENT
Start: 2022-11-20

## 2022-11-20 RX ORDER — DIPHENHYDRAMINE HCL 25 MG
25 TABLET ORAL EVERY 6 HOURS PRN
Qty: 30 TABLET | Refills: 0 | Status: SHIPPED | OUTPATIENT
Start: 2022-11-20

## 2022-11-20 RX ORDER — BENZONATATE 100 MG/1
100 CAPSULE ORAL 3 TIMES DAILY PRN
Qty: 20 CAPSULE | Refills: 0 | Status: SHIPPED | OUTPATIENT
Start: 2022-11-20

## 2022-11-20 RX ORDER — ALBUTEROL SULFATE 90 UG/1
2 AEROSOL, METERED RESPIRATORY (INHALATION) EVERY 6 HOURS PRN
Qty: 18 G | Refills: 0 | Status: SHIPPED | OUTPATIENT
Start: 2022-11-20

## 2022-11-20 NOTE — PROGRESS NOTES
[address]        NAME: Bo Orellana is a 52 y o  female  : 1975    MRN: [id number]  DATE: 2022  TIME: 8:40 AM    Assessment and Plan   Upper respiratory tract infection, unspecified type [J06 9]  1  Upper respiratory tract infection, unspecified type  fluticasone (FLONASE) 50 mcg/act nasal spray    benzonatate (TESSALON PERLES) 100 mg capsule    albuterol (Ventolin HFA) 90 mcg/act inhaler    diphenhydrAMINE (BENADRYL) 25 mg tablet      2  COVID  fluticasone (FLONASE) 50 mcg/act nasal spray    benzonatate (TESSALON PERLES) 100 mg capsule    albuterol (Ventolin HFA) 90 mcg/act inhaler    diphenhydrAMINE (BENADRYL) 25 mg tablet            Patient Instructions       Follow up with PCP in 3-5 days  Proceed to  ER if symptoms worsen  Chief Complaint     Chief Complaint   Patient presents with   • Cough     Cough, fever, nasal congestion x 2 days, positive covid test         History of Present Illness       Patient is a 51-year-old female presents to the office complaining of runny nose cough and congestion for the last 3 days  Patient states she was sent home films work on Friday and did a home COVID test today with positive results  Patient has no shortness of breath is able to converse without being dyspneic  Patient is tolerating p o  with no vomiting or diarrhea  Patient does have moderate amount of nasal congestion with no nasal discharge  Cough  This is a new problem  The current episode started in the past 7 days  The problem has been unchanged  The cough is non-productive  Associated symptoms include chills, a fever, nasal congestion and postnasal drip  Pertinent negatives include no rhinorrhea  She has tried nothing for the symptoms  Review of Systems   Review of Systems   Constitutional: Positive for chills and fever  HENT: Positive for congestion and postnasal drip  Negative for rhinorrhea  Respiratory: Positive for cough  Cardiovascular: Negative  Gastrointestinal: Negative  Endocrine: Negative  Genitourinary: Positive for difficulty urinating  All other systems reviewed and are negative          Current Medications       Current Outpatient Medications:   •  albuterol (Ventolin HFA) 90 mcg/act inhaler, Inhale 2 puffs every 6 (six) hours as needed for wheezing, Disp: 18 g, Rfl: 0  •  benzonatate (TESSALON PERLES) 100 mg capsule, Take 1 capsule (100 mg total) by mouth 3 (three) times a day as needed for cough, Disp: 20 capsule, Rfl: 0  •  diphenhydrAMINE (BENADRYL) 25 mg tablet, Take 1 tablet (25 mg total) by mouth every 6 (six) hours as needed for itching, Disp: 30 tablet, Rfl: 0  •  fluticasone (FLONASE) 50 mcg/act nasal spray, 1 spray into each nostril daily, Disp: 16 g, Rfl: 0    Current Allergies     Allergies as of 11/20/2022 - Reviewed 11/20/2022   Allergen Reaction Noted   • Pollen extract Allergic Rhinitis 04/25/2019   • Suture Other (See Comments) 04/14/2020            The following portions of the patient's history were reviewed and updated as appropriate: allergies, current medications, past family history, past medical history, past social history, past surgical history and problem list      Past Medical History:   Diagnosis Date   • Anemia     iron def   • Bulla of lung ([address] )    • Coccydynia    • Family history of vitamin B12 deficiency    • Fibroid, uterine    • Hematuria     last assessed: 12/11/2013   • History of hypertension    • History of morbid obesity    • History of vitamin B deficiency    • History of vitamin D deficiency    • Hypertension    • Hypocalcemia    • Hypocalcemia    • Iron deficiency    • Iron deficiency    • Kyphosis deformity of spine    • Menopause    • Migraine    • Morbid (severe) obesity due to excess calories ([address] )     per [employer]-last assessed: 4/11/2014   • Narrow angle glaucoma suspect of both eyes    • PONV (postoperative nausea and vomiting)     nausea only   • Postgastrectomy malabsorption    • Vitamin A deficiency     last assessed: 11/11/2014       Past Surgical History:   Procedure Laterality Date   • APPENDECTOMY     • AUGMENTATION MAMMAPLASTY Bilateral 12/26/2018   • BRAIN SURGERY  1990    AVM   • BREAST BIOPSY Left 10/12/218    x2   • BUNIONECTOMY Right 10/2/2019    Procedure: EXOSTECTOMY RIGHT FOOT;  Surgeon: Kirk Villalta DPM;  Location: [facility] MAIN OR;  Service: Podiatry   • [address] [place] [address]   • COSMETIC SURGERY      tummy tuck,breast implants and lift   • ENDOMETRIAL ABLATION  08/2018   • HYSTERECTOMY  2019   • NH LAP, SUPRACERVIAL HYSTERECTOMY W/ TUBE&OV, <250G N/A 9/16/2019    Procedure: HYSTERECTOMY LAPAROSCOPIC SUPRACERVICAL ([facility])  WITH B/L SALPINGECTOMY   EXCISION OF ENDOMETRIAL CYST;  Surgeon: Nate Roman DO;  Location: [facility] Main OR;  Service: Gynecology   • REFRACTIVE SURGERY      glaucoma   • HITESH-EN-Y PROCEDURE  2014   • TONSILLECTOMY AND ADENOIDECTOMY      including adenoids   • TOOTH EXTRACTION     • TUBAL LIGATION     • US GUIDANCE BREAST BIOPSY LEFT EACH ADDITIONAL Left 10/12/2018   • US GUIDED BREAST BIOPSY LEFT COMPLETE Left 10/12/2018       Family History   Problem Relation Age of Onset   • Hypertension Mother    • Atrial fibrillation Mother    • Melanoma Mother    • Cancer Mother         on left upper chest   • Graves' disease Mother    • Lung cancer Mother    • Colon polyps Mother    • Hypertension Father    • Atrial fibrillation Father    • Diabetes Father    • Obesity Father    • Allergies Father    • Heart disease Father    • Colon polyps Father    • Diverticulitis Father    • Stomach cancer Maternal Aunt    • Other Maternal Aunt         GIST, malignant-per [employer]   • Stomach cancer Paternal Aunt    • No Known Problems Maternal Grandmother    • Cancer Maternal Grandfather         Lung   • Lung cancer Maternal Grandfather    • Stroke Paternal Grandmother    • Cancer Paternal Grandfather         Lung   • Lung cancer Paternal Grandfather    • No Known Problems Daughter • No Known Problems Paternal Aunt    • No Known Problems Paternal Aunt    • No Known Problems Paternal Aunt    • No Known Problems Paternal Aunt    • Depression Neg Hx    • Anxiety disorder Neg Hx    • Drug abuse Neg Hx          Medications have been verified  Objective   Pulse 88   Temp 98 3 °F (36 8 °C)   Resp 20   Ht 5' 7" (1 702 m)   Wt 70 3 kg (155 lb)   LMP 09/09/2019   SpO2 99%   BMI 24 28 kg/m²   Patient's last menstrual period was 09/09/2019  Physical Exam     Physical Exam  Vitals and nursing note reviewed  HENT:      Head: Normocephalic  Right Ear: Tympanic membrane, ear canal and external ear normal  There is no impacted cerumen  Left Ear: Tympanic membrane, ear canal and external ear normal  There is no impacted cerumen  Nose: Congestion present  Mouth/Throat:      Mouth: Mucous membranes are moist       Pharynx: Oropharynx is clear  Eyes:      Extraocular Movements: Extraocular movements intact  Conjunctiva/sclera: Conjunctivae normal       Pupils: Pupils are equal, round, and reactive to light  Cardiovascular:      Rate and Rhythm: Normal rate  Pulses: Normal pulses  Heart sounds: No murmur heard  No friction rub  No gallop  Pulmonary:      Effort: Pulmonary effort is normal  No respiratory distress  Breath sounds: No stridor  No wheezing, rhonchi or rales  Chest:      Chest wall: No tenderness  Abdominal:      General: Abdomen is flat  Bowel sounds are normal  There is no distension  Palpations: There is no mass  Tenderness: There is no abdominal tenderness  There is no right CVA tenderness, left CVA tenderness, guarding or rebound  Hernia: No hernia is present  Musculoskeletal:         General: No swelling, tenderness, deformity or signs of injury  Normal range of motion  Cervical back: Normal range of motion  Right lower leg: No edema  Left lower leg: No edema     Skin:     General: Skin is warm       Capillary Refill: Capillary refill takes less than 2 seconds  Coloration: Skin is not jaundiced or pale  Findings: No bruising, erythema, lesion or rash  Neurological:      General: No focal deficit present  Mental Status: She is alert  Cranial Nerves: No cranial nerve deficit  Sensory: No sensory deficit  Motor: No weakness        Coordination: Coordination normal       Gait: Gait normal       Deep Tendon Reflexes: Reflexes normal    Psychiatric:         Mood and Affect: Mood normal

## 2022-11-28 ENCOUNTER — TELEMEDICINE (OUTPATIENT)
Dept: INTERNAL MEDICINE CLINIC | Facility: OTHER | Age: 47
End: 2022-11-28

## 2022-11-28 VITALS
WEIGHT: 155 LBS | HEIGHT: 67 IN | HEART RATE: 58 BPM | TEMPERATURE: 98.7 F | BODY MASS INDEX: 24.33 KG/M2 | OXYGEN SATURATION: 96 %

## 2022-11-28 DIAGNOSIS — U09.9 POST-ACUTE SEQUELAE OF COVID-19 (PASC): ICD-10-CM

## 2022-11-28 DIAGNOSIS — J06.9 UPPER RESPIRATORY TRACT INFECTION, UNSPECIFIED TYPE: Primary | ICD-10-CM

## 2022-11-28 RX ORDER — AMOXICILLIN AND CLAVULANATE POTASSIUM 875; 125 MG/1; MG/1
1 TABLET, FILM COATED ORAL EVERY 12 HOURS SCHEDULED
Qty: 14 TABLET | Refills: 0 | Status: SHIPPED | OUTPATIENT
Start: 2022-11-28 | End: 2022-12-05

## 2022-11-28 NOTE — LETTER
November 28, 2022     Patient: Carolina Rasmussen  YOB: 1975  Date of Visit: 11/28/2022      To Whom it May Concern:    Carolina Rasmussen is under my professional care  Óscar Dexter was seen in my office on 11/28/2022  Óscar Dexter may return to work on 11/29/2022  If you have any questions or concerns, please don't hesitate to call           Sincerely,          Eliazar Bauer MD

## 2022-11-28 NOTE — ASSESSMENT & PLAN NOTE
Will treat with Augmentin twice a day for 7 days  Discussed continuing over-the-counter decongestants and Flonase  She is to contact our office for worsening symptoms

## 2022-11-28 NOTE — PROGRESS NOTES
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[facility])    Relevant Medications    amoxicillin-clavulanate (AUGMENTIN) 875-125 mg per tablet      Disposition:     I have spent 15 minutes directly with the patient  Greater than 50% of this time was spent in counseling/coordination of care regarding: diagnostic results, prognosis, risks and benefits of treatment options, instructions for management, patient and family education, importance of treatment compliance, risk factor reductions and impressions  Encounter provider: Freddy Hashimoto, MD     Provider located at: [address] [place] [address]     Recent Visits  No visits were found meeting these conditions  Showing recent visits within past 7 days and meeting all other requirements  Today's Visits  Date Type Provider Dept   11/28/22 Telemedicine Freddy Hashimoto, MD [facility]   Showing today's visits and meeting all other requirements  Future Appointments  No visits were found meeting these conditions  Showing future appointments within next 150 days and meeting all other requirements     This virtual check-in was done via [address] and patient was informed that this is a secure, HIPAA-compliant platform  She agrees to proceed  Patient agrees to participate in a virtual check in via telephone or video visit instead of presenting to the office to address urgent/immediate medical needs   Patient is aware this is a billable service  She acknowledged consent and understanding of privacy and security of the video platform  The patient has agreed to participate and understands they can discontinue the visit at any time  After connecting through [facility], the patient was identified by name and date of birth  Zulema Mcnamara was informed that this was a telemedicine visit and that the exam was being conducted confidentially over secure lines  My office door was closed  No one else was in the room  Zulema Mcnamara acknowledged consent and understanding of privacy and security of the telemedicine visit  I informed the patient that I have reviewed her record in Epic and presented the opportunity for her to ask any questions regarding the visit today  The patient agreed to participate  Verification of patient location:  Patient is located in the following state in which I hold an active license: [place]    Subjective:   Zulema Mcnamara is a 52 y o  female who has been screened for COVID-19  Symptom change since last report: worsening  Patient's symptoms include nasal congestion, cough and headache  Patient denies fever, chills, fatigue, rhinorrhea, sore throat, shortness of breath, chest tightness, abdominal pain, nausea, vomiting and diarrhea  - Date of symptom onset: 11/20/2022  - Date of positive COVID-19 test: 11/20/2022  Type of test: Rapid antigen  COVID-19 vaccination status: Fully vaccinated (primary series)    Sana Hein has been staying home and has isolated themselves in her home  She is taking care to not share personal items and is cleaning all surfaces that are touched often, like counters, tabletops, and doorknobs using household cleaning sprays or wipes  She is wearing a mask when she leaves her room  15-year-old female is seen today with persistent URI symptoms since testing positive for COVID on 11/20/2022    She has noticed a worsening of her symptoms to which she has become more congested with green mucus production  She denies any fevers or chills  She has been taking over-the-counter decongestants and Flonase with minimal symptom relief  Lab Results   Component Value Date    SARSCOV2 Negative 08/14/2021    SARSCOV2 Not Detected 10/07/2020    SARSCOVAG Positive (A) 11/20/2022       Review of Systems   Constitutional: Negative for activity change, appetite change, chills, diaphoresis, fatigue and fever  HENT: Positive for congestion  Negative for postnasal drip, rhinorrhea, sinus pressure, sinus pain, sneezing and sore throat  Eyes: Negative for visual disturbance  Respiratory: Positive for cough  Negative for apnea, choking, chest tightness, shortness of breath and wheezing  Cardiovascular: Negative for chest pain, palpitations and leg swelling  Gastrointestinal: Negative for abdominal distention, abdominal pain, anal bleeding, blood in stool, constipation, diarrhea, nausea and vomiting  Endocrine: Negative for cold intolerance and heat intolerance  Genitourinary: Negative for difficulty urinating, dysuria and hematuria  Musculoskeletal: Negative  Skin: Negative  Neurological: Positive for headaches  Negative for dizziness, weakness, light-headedness and numbness  Hematological: Negative for adenopathy  Psychiatric/Behavioral: Negative for agitation, sleep disturbance and suicidal ideas  All other systems reviewed and are negative      Current Outpatient Medications on File Prior to Visit   Medication Sig   • benzonatate (TESSALON PERLES) 100 mg capsule Take 1 capsule (100 mg total) by mouth 3 (three) times a day as needed for cough   • diphenhydrAMINE (BENADRYL) 25 mg tablet Take 1 tablet (25 mg total) by mouth every 6 (six) hours as needed for itching   • fluticasone (FLONASE) 50 mcg/act nasal spray 1 spray into each nostril daily   • albuterol (Ventolin HFA) 90 mcg/act inhaler Inhale 2 puffs every 6 (six) hours as needed for wheezing (Patient not taking: Reported on 11/28/2022)       Objective:    Pulse 58   Temp 98 7 °F (37 1 °C) (Skin) Comment (Src): forehead scanner  Ht 5' 7" (1 702 m)   Wt 70 3 kg (155 lb)   LMP 09/09/2019   SpO2 96% Comment: room air  BMI 24 28 kg/m²      Physical Exam  Vitals and nursing note reviewed  Constitutional:       General: She is not in acute distress  Appearance: Normal appearance  She is not ill-appearing or toxic-appearing  HENT:      Head: Normocephalic and atraumatic  Mouth/Throat:      Mouth: Mucous membranes are moist       Pharynx: Oropharynx is clear  No oropharyngeal exudate or posterior oropharyngeal erythema  Eyes:      General: No scleral icterus  Extraocular Movements: Extraocular movements intact  Conjunctiva/sclera: Conjunctivae normal    Pulmonary:      Effort: Pulmonary effort is normal  No respiratory distress  Abdominal:      General: Abdomen is flat  Tenderness: There is no abdominal tenderness  Musculoskeletal:         General: No swelling, deformity or signs of injury  Normal range of motion  Cervical back: Normal range of motion  Right lower leg: No edema  Left lower leg: No edema  Skin:     General: Skin is warm and dry  Coloration: Skin is not jaundiced or pale  Findings: No bruising, erythema, lesion or rash  Neurological:      General: No focal deficit present  Mental Status: She is alert and oriented to person, place, and time  Mental status is at baseline  Cranial Nerves: No cranial nerve deficit  Sensory: No sensory deficit  Psychiatric:         Mood and Affect: Mood normal          Behavior: Behavior normal          Thought Content:  Thought content normal          Judgment: Judgment normal        Ana Heller MD

## 2023-02-08 ENCOUNTER — OFFICE VISIT (OUTPATIENT)
Dept: URGENT CARE | Facility: CLINIC | Age: 48
End: 2023-02-08

## 2023-02-08 VITALS
SYSTOLIC BLOOD PRESSURE: 126 MMHG | OXYGEN SATURATION: 98 % | TEMPERATURE: 97.5 F | RESPIRATION RATE: 17 BRPM | HEART RATE: 80 BPM | DIASTOLIC BLOOD PRESSURE: 68 MMHG

## 2023-02-08 DIAGNOSIS — J40 BRONCHITIS: ICD-10-CM

## 2023-02-08 DIAGNOSIS — J98.4 LUNG ABNORMALITY: ICD-10-CM

## 2023-02-08 DIAGNOSIS — J06.9 ACUTE URI: ICD-10-CM

## 2023-02-08 DIAGNOSIS — J06.9 ACUTE URI: Primary | ICD-10-CM

## 2023-02-08 RX ORDER — CEFUROXIME AXETIL 500 MG/1
500 TABLET ORAL EVERY 12 HOURS SCHEDULED
Qty: 14 TABLET | Refills: 0 | Status: SHIPPED | OUTPATIENT
Start: 2023-02-08 | End: 2023-02-15

## 2023-02-08 RX ORDER — AMOXICILLIN 875 MG/1
875 TABLET, COATED ORAL 2 TIMES DAILY
Qty: 14 TABLET | Refills: 0 | Status: SHIPPED | OUTPATIENT
Start: 2023-02-08 | End: 2023-02-08

## 2023-02-08 NOTE — PROGRESS NOTES
[facility]    NAME: Sara Najera is a 52 y o  female  : 1975    MRN: [id number]  DATE: 2023  TIME: 3:49 PM    Assessment and Plan   Acute URI [J06 9]  1  Acute URI  amoxicillin (AMOXIL) 875 mg tablet      2  Bronchitis  amoxicillin (AMOXIL) 875 mg tablet      3  Lung abnormality  amoxicillin (AMOXIL) 875 mg tablet          Patient Instructions   Patient Instructions       This may be residual viral illness however due to your history and physical exam findings will cover with antibiotic  Take as instructed  In light of your history of lung abnormality would recommend that you contact your primary care provider soon as possible to arrange follow-up for approximately 7 to 10 days  Strongly encourage getting plenty of rest over the next few days  Increase your hydration  Vaporizer by bedside may also be helpful  Symptom Relief Suggestions: If you are having any sore throat or hoarseness,  you may do warm salt water gargles every 1-2 hours while awake, throat lozenges, Tylenol, voice rest, warm tea with honey  If you are having sinus pressure, nasal congestion, runny nose, and / or post nasal drip you may try the following to help ease your symptoms:            *Clearing your sinuses in a nice steamy shower may be helpful, especially first thing after waking  *Nasal saline rinses every 1-2 hours while awake may also help decrease nasal congestion, drainage  *Afrin nasal spray if significant nasal congestion at bedtime may use   (Do not use for over 3 days however )       *Decongestant / expectorant such as Mucinex D 12 hour 1/2 to 1 tablet as needed with full glass of fluids may help decrease pressure and drainage  If you are having difficulty with ear pressure, discomfort:     Decongestant may be helpful  Flonase nasal spray  Warm compresses against ear(s) for comfort  Although bothersome, mucous is not necessarily a bad thing  Production of mucous is the body's way of trying to capture and flush irritants from mucosal surfaces  Yellow or green mucous does not necessarily mean you have a bacterial infection  Mucous will become more discolored over time, especially first thing in the morning, as your body's immune system  floods the mucosal surfaces with white bloods cells to try and help fight  infection  This white blood cell debride can also cause mucous to be discolored  Again, using nasal saline spray frequently may help soothe and keep mucous flowing out versus getting dried, thickened and / or stuck leading to more sinus pain and pressure  If you have a cough, please realize that a cough is not necessarily a bad thing but a way that your body may be trying to keep your airways clear  Phlegm may be more discolored in the morning  Please note that discolored phlegm does not necessarily mean a bacterial infection  The following things may help with your cough:         *Warm tea with honey or a teaspoon of honey periodically throughout day and / or before bed  *You may also use plain Mucinex (an expectorant to help keep mucus thin so you can clear it easier) or Mucinex DM (expectorant / cough suyppressant) to help decrease cough if it is bothering your sleep  An expectorant works best if you take with full glass of fluids  Other night time cough medication options include Delsym, Robitussin DM, NyQuil  *Propping with an extra pillow or two may be helpful  *Keep water by your bedside to sip on as needed  *Cough drops  Most upper respiratory symptoms start to improve after 7-10 days but may take a few weeks to completely resolve  Mucus may be more discolored first thing in the morning  Discolored mucous does not necessarily mean bacterial infection but can be dehydrated mucous or mucous filled with white blood cell debride that have been helping you to fight your illness             Follow up with your PCP office if not improving over next 10 days  If significant weakness, chest pain, shortness of breath proceed to ER for immediate further evaluation  Transmission precautions advised  Chief Complaint     Chief Complaint   Patient presents with   • Cold Like Symptoms     Patient c/o cough, runny nose, sore throat since over a week ago       History of Present Illness   Radha Griffin presents to the clinic c/o  58-year-old female comes in with c/o sore throat  Started: About a week ago  Associated signs and symptoms: Nasal congestion drainage sore throat cough with some gurgly sensation in chest   Modifying factors: Benadryl, Claritin, nighttime cough medication  Known Exposures: No specific known exposure but works at [employer] asthma or pneumonia: Denies any history of asthma or pneumonia but does have a bleb on one of her lungs  This scares her and she is afraid that it might rupture with coughing  Review of Systems   Review of Systems   Constitutional: Positive for activity change, appetite change and fatigue  Negative for chills and fever  HENT: Positive for congestion, postnasal drip, rhinorrhea, sinus pressure, sore throat and trouble swallowing  Negative for ear discharge, ear pain and sinus pain  Eyes: Negative  Respiratory: Positive for cough  Negative for chest tightness, shortness of breath and wheezing  Cardiovascular: Negative  Hematological: Negative          Current Medications     Long-Term Medications   Medication Sig Dispense Refill   • diphenhydrAMINE (BENADRYL) 25 mg tablet Take 1 tablet (25 mg total) by mouth every 6 (six) hours as needed for itching 30 tablet 0   • fluticasone (FLONASE) 50 mcg/act nasal spray 1 spray into each nostril daily 16 g 0       Current Allergies     Allergies as of 02/08/2023 - Reviewed 02/08/2023   Allergen Reaction Noted   • Pollen extract Allergic Rhinitis 04/25/2019   • Suture Other (See Comments) 2020          The following portions of the patient's history were reviewed and updated as appropriate: allergies, current medications, past family history, past medical history, past social history, past surgical history and problem list   Past Medical History:   Diagnosis Date   • Anemia     iron def   • Bulla of lung ([address] )    • Clotting disorder ([address] )     AVM   • Coccydynia    • Family history of vitamin B12 deficiency    • Fibroid, uterine    • Headache(784 0)    • Hematuria     last assessed: 2013   • History of hypertension    • History of morbid obesity    • History of vitamin B deficiency    • History of vitamin D deficiency    • Hypertension    • Hypocalcemia    • Hypocalcemia    • Iron deficiency    • Iron deficiency    • Kyphosis deformity of spine    • Menopause    • Migraine    • Morbid (severe) obesity due to excess calories ([address] )     per [employer]-last assessed: 2014   • Narrow angle glaucoma suspect of both eyes    • Obesity    • PONV (postoperative nausea and vomiting)     nausea only   • Postgastrectomy malabsorption    • Vitamin A deficiency     last assessed: 2014     Past Surgical History:   Procedure Laterality Date   • APPENDECTOMY     • AUGMENTATION MAMMAPLASTY Bilateral 2018   • BRAIN SURGERY      AVM   • BREAST BIOPSY Left 10/12/218    x2   • BUNIONECTOMY Right 10/02/2019    Procedure: EXOSTECTOMY RIGHT FOOT;  Surgeon: Dilshad Vega DPM;  Location: [facility] MAIN OR;  Service: Podiatry   •  SECTION     • COLONOSCOPY W/ POLYPECTOMY  2022    5 YEAR RECOMMENDED F/U   • COSMETIC SURGERY      tummy tuck,breast implants and lift   • ENDOMETRIAL ABLATION  2018   • HYSTERECTOMY     • OK LAPS SUPRACRV HYSTERECT 250 GM/< RMVL TUBE/OVAR N/A 2019    Procedure: HYSTERECTOMY LAPAROSCOPIC SUPRACERVICAL ([address])  WITH B/L SALPINGECTOMY   EXCISION OF ENDOMETRIAL CYST;  Surgeon: Denisha Castro DO;  Location: [facility] OR;  Service: Gynecology   • REFRACTIVE SURGERY      glaucoma   • HITESH-EN-Y PROCEDURE  2014   • TONSILLECTOMY AND ADENOIDECTOMY      including adenoids   • TOOTH EXTRACTION     • TUBAL LIGATION     • US GUIDANCE BREAST BIOPSY LEFT EACH ADDITIONAL Left 10/12/2018   • US GUIDED BREAST BIOPSY LEFT COMPLETE Left 10/12/2018     Family History   Problem Relation Age of Onset   • Hypertension Mother    • Atrial fibrillation Mother    • Melanoma Mother    • Cancer Mother         Melanoma   • Graves' disease Mother    • Lung cancer Mother    • Colon polyps Mother    • Thyroid disease Mother    • Hypertension Father    • Atrial fibrillation Father    • Diabetes Father         Type 2   • Obesity Father    • Allergies Father    • Heart disease Father    • Colon polyps Father    • Diverticulitis Father    • Stomach cancer Maternal Aunt    • Other Maternal Aunt         GIST, malignant-per [employer]   • Stomach cancer Paternal Aunt    • No Known Problems Maternal Grandmother    • Cancer Maternal Grandfather         Lung   • Lung cancer Maternal Grandfather    • Stroke Paternal Grandmother    • Cancer Paternal Grandfather         Lung   • Lung cancer Paternal Grandfather    • No Known Problems Daughter    • No Known Problems Paternal Aunt    • No Known Problems Paternal Aunt    • No Known Problems Paternal Aunt    • No Known Problems Paternal Aunt    • Substance Abuse Brother    • Depression Neg Hx    • Anxiety disorder Neg Hx    • Drug abuse Neg Hx        Objective   /68   Pulse 80   Temp 97 5 °F (36 4 °C) (Tympanic)   Resp 17   LMP 09/09/2019   SpO2 98%   Patient's last menstrual period was 09/09/2019  Physical Exam     Physical Exam  Vitals and nursing note reviewed  Constitutional:       General: She is not in acute distress  Appearance: She is well-developed  She is ill-appearing  She is not toxic-appearing or diaphoretic  Comments: Appears mildly ill but in no acute distress  No trismus or conversational dyspnea     HENT: Head: Normocephalic and atraumatic  Right Ear: Tympanic membrane, ear canal and external ear normal       Left Ear: Tympanic membrane, ear canal and external ear normal       Nose: Congestion present  No rhinorrhea  Comments: Deviated nasal septum noted  Mouth/Throat:      Mouth: Mucous membranes are moist       Pharynx: Posterior oropharyngeal erythema present  No oropharyngeal exudate  Comments: Cobblestoning posterior pharynx with patchy redness  Eyes:      General:         Right eye: No discharge  Left eye: No discharge  Conjunctiva/sclera: Conjunctivae normal       Pupils: Pupils are equal, round, and reactive to light  Cardiovascular:      Rate and Rhythm: Normal rate and regular rhythm  Heart sounds: Normal heart sounds  No murmur heard  No friction rub  No gallop  Pulmonary:      Effort: Pulmonary effort is normal  No respiratory distress  Breath sounds: Normal breath sounds  No stridor  No wheezing, rhonchi or rales  Musculoskeletal:      Cervical back: Normal range of motion and neck supple  No rigidity or tenderness  Lymphadenopathy:      Cervical: No cervical adenopathy  Skin:     General: Skin is warm and dry  Coloration: Skin is not jaundiced or pale  Findings: No rash  Neurological:      Mental Status: She is alert and oriented to person, place, and time     Psychiatric:         Mood and Affect: Mood normal          Behavior: Behavior normal

## 2023-02-08 NOTE — PATIENT INSTRUCTIONS
This may be residual viral illness however due to your history and physical exam findings will cover with antibiotic  Take as instructed  In light of your history of lung abnormality would recommend that you contact your primary care provider soon as possible to arrange follow-up for approximately 7 to 10 days  Strongly encourage getting plenty of rest over the next few days  Increase your hydration  Vaporizer by bedside may also be helpful  Symptom Relief Suggestions: If you are having any sore throat or hoarseness,  you may do warm salt water gargles every 1-2 hours while awake, throat lozenges, Tylenol, voice rest, warm tea with honey  If you are having sinus pressure, nasal congestion, runny nose, and / or post nasal drip you may try the following to help ease your symptoms:            *Clearing your sinuses in a nice steamy shower may be helpful, especially first thing after waking  *Nasal saline rinses every 1-2 hours while awake may also help decrease nasal congestion, drainage  *Afrin nasal spray if significant nasal congestion at bedtime may use   (Do not use for over 3 days however )       *Decongestant / expectorant such as Mucinex D 12 hour 1/2 to 1 tablet as needed with full glass of fluids may help decrease pressure and drainage  If you are having difficulty with ear pressure, discomfort:     Decongestant may be helpful  Flonase nasal spray  Warm compresses against ear(s) for comfort  Although bothersome, mucous is not necessarily a bad thing  Production of mucous is the body's way of trying to capture and flush irritants from mucosal surfaces  Yellow or green mucous does not necessarily mean you have a bacterial infection  Mucous will become more discolored over time, especially first thing in the morning, as your body's immune system  floods the mucosal surfaces with white bloods cells to try and help fight  infection    This white blood cell debride can also cause mucous to be discolored  Again, using nasal saline spray frequently may help soothe and keep mucous flowing out versus getting dried, thickened and / or stuck leading to more sinus pain and pressure  If you have a cough, please realize that a cough is not necessarily a bad thing but a way that your body may be trying to keep your airways clear  Phlegm may be more discolored in the morning  Please note that discolored phlegm does not necessarily mean a bacterial infection  The following things may help with your cough:         *Warm tea with honey or a teaspoon of honey periodically throughout day and / or before bed  *You may also use plain Mucinex (an expectorant to help keep mucus thin so you can clear it easier) or Mucinex DM (expectorant / cough suyppressant) to help decrease cough if it is bothering your sleep  An expectorant works best if you take with full glass of fluids  Other night time cough medication options include Delsym, Robitussin DM, NyQuil  *Propping with an extra pillow or two may be helpful  *Keep water by your bedside to sip on as needed  *Cough drops  Most upper respiratory symptoms start to improve after 7-10 days but may take a few weeks to completely resolve  Mucus may be more discolored first thing in the morning  Discolored mucous does not necessarily mean bacterial infection but can be dehydrated mucous or mucous filled with white blood cell debride that have been helping you to fight your illness  Follow up with your PCP office if not improving over next 10 days  If significant weakness, chest pain, shortness of breath proceed to ER for immediate further evaluation  Transmission precautions advised

## 2023-02-22 ENCOUNTER — HOSPITAL ENCOUNTER (OUTPATIENT)
Dept: MAMMOGRAPHY | Facility: MEDICAL CENTER | Age: 48
Discharge: HOME | End: 2023-02-22

## 2023-02-22 VITALS — BODY MASS INDEX: 24.33 KG/M2 | WEIGHT: 155 LBS | HEIGHT: 67 IN

## 2023-02-22 DIAGNOSIS — Z12.31 ENCOUNTER FOR SCREENING MAMMOGRAM FOR MALIGNANT NEOPLASM OF BREAST: ICD-10-CM

## 2023-03-13 ENCOUNTER — HOSPITAL ENCOUNTER (OUTPATIENT)
Dept: ULTRASOUND IMAGING | Facility: CLINIC | Age: 48
Discharge: HOME | End: 2023-03-13

## 2023-03-13 DIAGNOSIS — R92.8 ABNORMAL MAMMOGRAM: ICD-10-CM

## 2023-04-28 RX ORDER — ACETAMINOPHEN 325 MG/1
650 TABLET ORAL EVERY 6 HOURS PRN
COMMUNITY
End: 2023-05-04

## 2023-04-28 NOTE — PRE-PROCEDURE INSTRUCTIONS
Pre-Surgery Instructions:   Medication Instructions    acetaminophen (TYLENOL) 325 mg tablet Uses PRN- OK to take day of surgery    diphenhydrAMINE (BENADRYL) 25 mg tablet Uses PRN- OK to take day of surgery    fluticasone (FLONASE) 50 mcg/act nasal spray Uses PRN- OK to take day of surgery    [facility] preop instructions reviewed with pt  Pt has surgical soap

## 2023-05-01 ENCOUNTER — CONSULT (OUTPATIENT)
Dept: INTERNAL MEDICINE CLINIC | Facility: OTHER | Age: 48
End: 2023-05-01

## 2023-05-01 VITALS
HEART RATE: 78 BPM | SYSTOLIC BLOOD PRESSURE: 124 MMHG | HEIGHT: 67 IN | OXYGEN SATURATION: 98 % | DIASTOLIC BLOOD PRESSURE: 80 MMHG | WEIGHT: 155 LBS | BODY MASS INDEX: 24.33 KG/M2 | TEMPERATURE: 98 F

## 2023-05-01 DIAGNOSIS — Z01.818 PREOPERATIVE CLEARANCE: Primary | ICD-10-CM

## 2023-05-01 DIAGNOSIS — M77.8 ENTHESOPATHY OF FOOT: ICD-10-CM

## 2023-05-01 NOTE — PROGRESS NOTES
INTERNAL MEDICINE OFFICE VISIT  [facility]   Attempted office        NAME: Thu Pina  AGE: 52 y o  SEX: female    DATE OF ENCOUNTER: 5/1/2023    Assessment and Plan     1  Preoperative clearance  2  Enthesopathy of foot    Planned EXOSTECTOMY OF FOOT (Right: Foot) with Dr Dino Dooley 05/03/2023     Thu Pina is seen for pre-operative evaluation  Patient reports no sx of chest pain reported at rest or with exertion, dyspnea at rest or with exertion, PND, LE edema, claudication, or palpitations  No history of ischemic heart disease, CHF, cardiovascular disease, diabetes  No recent anticoagulant or antithrombotic use  Patient reports no history of stress test, cardiac cath, or coronary revascularization)  The patient is able to achieve >4 METs of activity during walking / Conny Armor / taking up 2 flights of stairs    Pt's RCRI is 0 points, correlating with Class I risk, which carries a 3 9 percent 30 day risk of death, MI, or cardiac arrest  Patient's other comorbid conditions include none known; had hx of bariatric surgery  EKG 2019 NSR , normal study  There is no need for further diagnostic testing prior to patient's scheduled procedure  Patient may proceed with surgery with understanding of perioperative risk in light of the benefits of possible surgery         No orders of the defined types were placed in this encounter       - Counseling Documentation: patient was counseled regarding: diagnostic results, instructions for management and risk factor reductions    Chief Complaint     Chief Complaint   Patient presents with    Pre-op Clearance     pre-op - [facility]23, will bring paperwork will be having bone Francia Latch removed on right foot        History of Present Illness     Thu Pina is a 52years old female, overall healthy, does not take any daily medication, presenting today with her  requesting preop exam for planned EXOSTECTOMY OF FOOT (Right: Foot) on May 3, patient denies any medical chronic conditions denies any cardiac or respiratory symptoms, had surgeries before without any complication including AVM surgeries external and bariatric surgery  Most recent blood work from May 2022 and normal EKG in 2019 reviewed, and no further work-up indicated at the moment  HPI    The following portions of the patient's history were reviewed and updated as appropriate: allergies, current medications, past family history, past medical history, past social history, past surgical history and problem list     Review of Systems     Review of Systems  - GENERAL: Negative for any nausea, vomiting, fevers, chills, or weight loss  - HEENT: Negative for any head/Neck trauma, pain, double/blurry vision, sinusitis, rhinitis, nose bleeding   - CARDIAC: Negative for any chest pain, palpitation, Dyspnea on exertion, peripheral edema  - PULMONARY: Negative for any SOB, cough, wheezing    - GASTROINTESTINAL: Negative for any abdominal pain, N/V/D/C, blood in stool    - GENITOURINARY: Negative for any dysuria, hematuria, incontinence   - NEUROLOGIC: Negative for any muscle weakness, numbness/tingling, memory changes  - MUSCULOSKELETAL: Negative for any joint pains/swelling, limited ROM  - INTEGUMENTARY: Negative for any rashes, cuts/ lesions   - HEMATOLOGIC: Negative for any abnormal bruising, frequent infections or bleeding      Active Problem List     Patient Active Problem List   Diagnosis    Bariatric surgery status    Postsurgical malabsorption    Abnormal CT of the chest    Anemia, iron deficiency    Bulla, lung ([address] )    Coccydynia    Neoplasm of skin    Adenomyosis    Preoperative clearance    Exostosis of right foot    Status post laparoscopic supracervical hysterectomy    Fatigue    Vitamin D deficiency    PONV (postoperative nausea and vomiting)    Post-acute sequelae of COVID-19 (PASC)    Upper respiratory tract infection    Enthesopathy of foot "      Objective     /80 (BP Location: Left arm, Patient Position: Sitting, Cuff Size: Adult)   Pulse 78   Temp 98 °F (36 7 °C) (Temporal)   Ht 5' 7\" (1 702 m)   Wt 70 3 kg (155 lb)   LMP 09/09/2019   SpO2 98%   BMI 24 28 kg/m²     Physical Exam  - GEN: Appears well, alert and oriented x 3, pleasant and cooperative, in no acute distress  - HEENT: Anicteric, mucous membranes moist, PERRL and EOMI   - NECK: No lymphadenopathy, JVD or carotid bruits   - HEART: RRR, normal S1 and S2, no murmurs, clicks, gallops or rubs   - LUNGS: Clear to auscultation bilaterally; no wheezes, rales, or rhonchi  - ABDOMEN: Normal bowel sounds, soft, no tenderness, no distention, no organomegaly or masses felt on exam    - EXTREMITIES: Peripheral pulses normal; no clubbing, cyanosis, or edema  - NEURO: No focal findings, CN II-XII are grossly intact  - Musculoskeletal: 5/5 strength, normal ROM, no swollen or erythematous joints     - SKIN: Normal without suspicious lesions on exposed skin    Pertinent Laboratory/Diagnostic Studies:  CBC:   Lab Results   Component Value Date/Time    WBC 5 12 05/07/2022 07:06 AM    WBC 4 44 09/10/2014 07:49 AM    RBC 4 91 05/07/2022 07:06 AM    RBC 4 58 09/10/2014 07:49 AM    HGB 14 8 05/07/2022 07:06 AM    HGB 14 4 09/10/2014 07:49 AM    HCT 46 2 (H) 05/07/2022 07:06 AM    HCT 43 0 09/10/2014 07:49 AM    MCV 94 05/07/2022 07:06 AM    MCV 94 09/10/2014 07:49 AM    MCH 30 1 05/07/2022 07:06 AM    MCH 31 4 09/10/2014 07:49 AM    MCHC 32 0 05/07/2022 07:06 AM    MCHC 33 5 09/10/2014 07:49 AM    RDW 13 2 05/07/2022 07:06 AM    RDW 12 9 09/10/2014 07:49 AM    MPV 10 8 05/07/2022 07:06 AM    MPV 10 8 09/10/2014 07:49 AM     05/07/2022 07:06 AM     09/10/2014 07:49 AM    NRBC 0 12/05/2019 12:26 PM    NEUTOPHILPCT 77 (H) 12/05/2019 12:26 PM    NEUTOPHILPCT 71 09/10/2014 07:49 AM    LYMPHOPCT 12 (L) 12/05/2019 12:26 PM    LYMPHOPCT 14 09/10/2014 07:49 AM    MONOPCT 8 12/05/2019 12:26 PM    " MONOPCT 12 09/10/2014 07:49 AM    EOSPCT 1 12/05/2019 12:26 PM    EOSPCT 1 09/10/2014 07:49 AM    BASOPCT 1 12/05/2019 12:26 PM    BASOPCT 1 09/10/2014 07:49 AM    NEUTROABS 7 42 12/05/2019 12:26 PM    NEUTROABS 3 15 09/10/2014 07:49 AM    LYMPHSABS 1 16 12/05/2019 12:26 PM    LYMPHSABS 0 62 09/10/2014 07:49 AM    MONOSABS 0 77 12/05/2019 12:26 PM    MONOSABS 0 53 09/10/2014 07:49 AM    EOSABS 0 11 12/05/2019 12:26 PM    EOSABS 0 04 09/10/2014 07:49 AM     Chemistry Profile:   Lab Results   Component Value Date/Time     09/10/2014 07:49 AM    K 4 0 05/07/2022 07:06 AM    K 3 8 09/10/2014 07:49 AM     05/07/2022 07:06 AM     09/10/2014 07:49 AM    CO2 30 05/07/2022 07:06 AM    CO2 31 09/10/2014 07:49 AM    ANIONGAP 5 09/10/2014 07:49 AM    BUN 15 05/07/2022 07:06 AM    BUN 11 09/10/2014 07:49 AM    CREATININE 0 66 05/07/2022 07:06 AM    CREATININE 0 55 09/10/2014 07:49 AM    GLUC 88 12/05/2019 12:26 PM    GLUF 83 05/07/2022 07:06 AM    GLUCOSE 88 09/10/2014 07:49 AM    CALCIUM 9 0 05/07/2022 07:06 AM    CALCIUM 8 9 09/10/2014 07:49 AM    AST 14 05/07/2022 07:06 AM    AST 17 09/10/2014 07:49 AM    ALT 22 05/07/2022 07:06 AM    ALT 20 09/10/2014 07:49 AM    ALKPHOS 77 05/07/2022 07:06 AM    ALKPHOS 85 09/10/2014 07:49 AM    PROT 6 6 09/10/2014 07:49 AM    BILITOT 1 1 (H) 09/10/2014 07:49 AM    EGFR 106 05/07/2022 07:06 AM     Cardiac Studies: No results found for: NTBNP, BNP, TROPONINI, POCTROP      Current Medications     Current Outpatient Medications:     acetaminophen (TYLENOL) 325 mg tablet, Take 650 mg by mouth every 6 (six) hours as needed for mild pain, Disp: , Rfl:     Diclofenac Sodium (VOLTAREN) 1 %, APPLY TO AFFECTED AREA 3 TO 4 TIMES A DAY, Disp: , Rfl:     diphenhydrAMINE (BENADRYL) 25 mg tablet, Take 1 tablet (25 mg total) by mouth every 6 (six) hours as needed for itching, Disp: 30 tablet, Rfl: 0    fluticasone (FLONASE) 50 mcg/act nasal spray, 1 spray into each nostril daily, Disp: 16 g, Rfl: 0    Health Maintenance     Health Maintenance   Topic Date Due    Hepatitis C Screening  Never done    PT PLAN OF CARE  Never done    HIV Screening  Never done    DTaP,Tdap,and Td Vaccines (1 - Tdap) Never done    COVID-19 Vaccine (3 - Booster for Christopher [employer] series) 06/24/2021    Annual Physical  05/03/2023    Depression Screening  05/26/2023    Influenza Vaccine (Season Ended) 09/01/2023    Breast Cancer Screening: Mammogram  02/22/2024    BMI: Adult  05/01/2024    Colorectal Cancer Screening  07/25/2027    Cervical Cancer Screening  11/01/2027    Pneumococcal Vaccine: Pediatrics (0 to 5 Years) and At-Risk Patients (6 to 59 Years)  Aged Out    HIB Vaccine  Aged Out    IPV Vaccine  Aged Out    Hepatitis A Vaccine  Aged Out    Meningococcal ACWY Vaccine  Aged Out    HPV Vaccine  Aged [employer] History   Administered Date(s) Administered    COVID-19 PFIZER VACCINE 0 3 ML IM 04/08/2021, 04/29/2021    INFLUENZA 11/04/2018, 11/06/2020, 11/07/2021    Influenza Quadrivalent 3 years and older 11/01/2021    Influenza, injectable, quadrivalent, preservative free 0 5 mL 11/06/2020    influenza, injectable, quadrivalent 11/01/2021       Ricki Smith DO   Internal Medicine - PGY3  [facility]    5/1/2023 4:23 PM

## 2023-05-01 NOTE — H&P (VIEW-ONLY)
INTERNAL MEDICINE OFFICE VISIT  [facility]   Attempted office        NAME: Trice Dietz  AGE: 52 y o  SEX: female    DATE OF ENCOUNTER: 5/1/2023    Assessment and Plan     1  Preoperative clearance  2  Enthesopathy of foot    Planned EXOSTECTOMY OF FOOT (Right: Foot) with Dr Yamileth Lynn 05/03/2023     Trice Dietz is seen for pre-operative evaluation  Patient reports no sx of chest pain reported at rest or with exertion, dyspnea at rest or with exertion, PND, LE edema, claudication, or palpitations  No history of ischemic heart disease, CHF, cardiovascular disease, diabetes  No recent anticoagulant or antithrombotic use  Patient reports no history of stress test, cardiac cath, or coronary revascularization)  The patient is able to achieve >4 METs of activity during walking / Vidya Hensen / taking up 2 flights of stairs    Pt's RCRI is 0 points, correlating with Class I risk, which carries a 3 9 percent 30 day risk of death, MI, or cardiac arrest  Patient's other comorbid conditions include none known; had hx of bariatric surgery  EKG 2019 NSR , normal study  There is no need for further diagnostic testing prior to patient's scheduled procedure  Patient may proceed with surgery with understanding of perioperative risk in light of the benefits of possible surgery         No orders of the defined types were placed in this encounter       - Counseling Documentation: patient was counseled regarding: diagnostic results, instructions for management and risk factor reductions    Chief Complaint     Chief Complaint   Patient presents with    Pre-op Clearance     [facility], will bring paperwork will be having bone Jose G Meka removed on right foot        History of Present Illness     Trice Dietz is a 52years old female, overall healthy, does not take any daily medication, presenting today with her  requesting preop exam for planned EXOSTECTOMY OF FOOT (Right: Foot) on May 3, patient denies any medical chronic conditions denies any cardiac or respiratory symptoms, had surgeries before without any complication including AVM surgeries external and bariatric surgery  Most recent blood work from May 2022 and normal EKG in 2019 reviewed, and no further work-up indicated at the moment  HPI    The following portions of the patient's history were reviewed and updated as appropriate: allergies, current medications, past family history, past medical history, past social history, past surgical history and problem list     Review of Systems     Review of Systems  - GENERAL: Negative for any nausea, vomiting, fevers, chills, or weight loss  - HEENT: Negative for any head/Neck trauma, pain, double/blurry vision, sinusitis, rhinitis, nose bleeding   - CARDIAC: Negative for any chest pain, palpitation, Dyspnea on exertion, peripheral edema  - PULMONARY: Negative for any SOB, cough, wheezing    - GASTROINTESTINAL: Negative for any abdominal pain, N/V/D/C, blood in stool    - GENITOURINARY: Negative for any dysuria, hematuria, incontinence   - NEUROLOGIC: Negative for any muscle weakness, numbness/tingling, memory changes  - MUSCULOSKELETAL: Negative for any joint pains/swelling, limited ROM  - INTEGUMENTARY: Negative for any rashes, cuts/ lesions   - HEMATOLOGIC: Negative for any abnormal bruising, frequent infections or bleeding      Active Problem List     Patient Active Problem List   Diagnosis    Bariatric surgery status    Postsurgical malabsorption    Abnormal CT of the chest    Anemia, iron deficiency    Bulla, lung ([address] )    Coccydynia    Neoplasm of skin    Adenomyosis    Preoperative clearance    Exostosis of right foot    Status post laparoscopic supracervical hysterectomy    Fatigue    Vitamin D deficiency    PONV (postoperative nausea and vomiting)    Post-acute sequelae of COVID-19 (PASC)    Upper respiratory tract infection    Enthesopathy of foot "      Objective     /80 (BP Location: Left arm, Patient Position: Sitting, Cuff Size: Adult)   Pulse 78   Temp 98 °F (36 7 °C) (Temporal)   Ht 5' 7\" (1 702 m)   Wt 70 3 kg (155 lb)   LMP 09/09/2019   SpO2 98%   BMI 24 28 kg/m²     Physical Exam  - GEN: Appears well, alert and oriented x 3, pleasant and cooperative, in no acute distress  - HEENT: Anicteric, mucous membranes moist, PERRL and EOMI   - NECK: No lymphadenopathy, JVD or carotid bruits   - HEART: RRR, normal S1 and S2, no murmurs, clicks, gallops or rubs   - LUNGS: Clear to auscultation bilaterally; no wheezes, rales, or rhonchi  - ABDOMEN: Normal bowel sounds, soft, no tenderness, no distention, no organomegaly or masses felt on exam    - EXTREMITIES: Peripheral pulses normal; no clubbing, cyanosis, or edema  - NEURO: No focal findings, CN II-XII are grossly intact  - Musculoskeletal: 5/5 strength, normal ROM, no swollen or erythematous joints     - SKIN: Normal without suspicious lesions on exposed skin    Pertinent Laboratory/Diagnostic Studies:  CBC:   Lab Results   Component Value Date/Time    WBC 5 12 05/07/2022 07:06 AM    WBC 4 44 09/10/2014 07:49 AM    RBC 4 91 05/07/2022 07:06 AM    RBC 4 58 09/10/2014 07:49 AM    HGB 14 8 05/07/2022 07:06 AM    HGB 14 4 09/10/2014 07:49 AM    HCT 46 2 (H) 05/07/2022 07:06 AM    HCT 43 0 09/10/2014 07:49 AM    MCV 94 05/07/2022 07:06 AM    MCV 94 09/10/2014 07:49 AM    MCH 30 1 05/07/2022 07:06 AM    MCH 31 4 09/10/2014 07:49 AM    MCHC 32 0 05/07/2022 07:06 AM    MCHC 33 5 09/10/2014 07:49 AM    RDW 13 2 05/07/2022 07:06 AM    RDW 12 9 09/10/2014 07:49 AM    MPV 10 8 05/07/2022 07:06 AM    MPV 10 8 09/10/2014 07:49 AM     05/07/2022 07:06 AM     09/10/2014 07:49 AM    NRBC 0 12/05/2019 12:26 PM    NEUTOPHILPCT 77 (H) 12/05/2019 12:26 PM    NEUTOPHILPCT 71 09/10/2014 07:49 AM    LYMPHOPCT 12 (L) 12/05/2019 12:26 PM    LYMPHOPCT 14 09/10/2014 07:49 AM    MONOPCT 8 12/05/2019 12:26 PM    " MONOPCT 12 09/10/2014 07:49 AM    EOSPCT 1 12/05/2019 12:26 PM    EOSPCT 1 09/10/2014 07:49 AM    BASOPCT 1 12/05/2019 12:26 PM    BASOPCT 1 09/10/2014 07:49 AM    NEUTROABS 7 42 12/05/2019 12:26 PM    NEUTROABS 3 15 09/10/2014 07:49 AM    LYMPHSABS 1 16 12/05/2019 12:26 PM    LYMPHSABS 0 62 09/10/2014 07:49 AM    MONOSABS 0 77 12/05/2019 12:26 PM    MONOSABS 0 53 09/10/2014 07:49 AM    EOSABS 0 11 12/05/2019 12:26 PM    EOSABS 0 04 09/10/2014 07:49 AM     Chemistry Profile:   Lab Results   Component Value Date/Time     09/10/2014 07:49 AM    K 4 0 05/07/2022 07:06 AM    K 3 8 09/10/2014 07:49 AM     05/07/2022 07:06 AM     09/10/2014 07:49 AM    CO2 30 05/07/2022 07:06 AM    CO2 31 09/10/2014 07:49 AM    ANIONGAP 5 09/10/2014 07:49 AM    BUN 15 05/07/2022 07:06 AM    BUN 11 09/10/2014 07:49 AM    CREATININE 0 66 05/07/2022 07:06 AM    CREATININE 0 55 09/10/2014 07:49 AM    GLUC 88 12/05/2019 12:26 PM    GLUF 83 05/07/2022 07:06 AM    GLUCOSE 88 09/10/2014 07:49 AM    CALCIUM 9 0 05/07/2022 07:06 AM    CALCIUM 8 9 09/10/2014 07:49 AM    AST 14 05/07/2022 07:06 AM    AST 17 09/10/2014 07:49 AM    ALT 22 05/07/2022 07:06 AM    ALT 20 09/10/2014 07:49 AM    ALKPHOS 77 05/07/2022 07:06 AM    ALKPHOS 85 09/10/2014 07:49 AM    PROT 6 6 09/10/2014 07:49 AM    BILITOT 1 1 (H) 09/10/2014 07:49 AM    EGFR 106 05/07/2022 07:06 AM     Cardiac Studies: No results found for: NTBNP, BNP, TROPONINI, POCTROP      Current Medications     Current Outpatient Medications:     acetaminophen (TYLENOL) 325 mg tablet, Take 650 mg by mouth every 6 (six) hours as needed for mild pain, Disp: , Rfl:     Diclofenac Sodium (VOLTAREN) 1 %, APPLY TO AFFECTED AREA 3 TO 4 TIMES A DAY, Disp: , Rfl:     diphenhydrAMINE (BENADRYL) 25 mg tablet, Take 1 tablet (25 mg total) by mouth every 6 (six) hours as needed for itching, Disp: 30 tablet, Rfl: 0    fluticasone (FLONASE) 50 mcg/act nasal spray, 1 spray into each nostril daily, Disp: 16 g, Rfl: 0    Health Maintenance     Health Maintenance   Topic Date Due    Hepatitis C Screening  Never done    PT PLAN OF CARE  Never done    HIV Screening  Never done    DTaP,Tdap,and Td Vaccines (1 - Tdap) Never done    COVID-19 Vaccine (3 - Booster for [employer] series) 06/24/2021    Annual Physical  05/03/2023    Depression Screening  05/26/2023    Influenza Vaccine (Season Ended) 09/01/2023    Breast Cancer Screening: Mammogram  02/22/2024    BMI: Adult  05/01/2024    Colorectal Cancer Screening  07/25/2027    Cervical Cancer Screening  11/01/2027    Pneumococcal Vaccine: Pediatrics (0 to 5 Years) and At-Risk Patients (6 to 59 Years)  Aged Out    HIB Vaccine  Aged Out    IPV Vaccine  Aged Out    Hepatitis A Vaccine  Aged Out    Meningococcal ACWY Vaccine  Aged Out    HPV Vaccine  Aged [employer] History   Administered Date(s) Administered    COVID-19 PFIZER VACCINE 0 3 ML IM 04/08/2021, 04/29/2021    INFLUENZA 11/04/2018, 11/06/2020, 11/07/2021    Influenza Quadrivalent 3 years and older 11/01/2021    Influenza, injectable, quadrivalent, preservative free 0 5 mL 11/06/2020    influenza, injectable, quadrivalent 11/01/2021       Wanda Winchester DO   Internal Medicine - PGY3  [place]    5/1/2023 4:23 PM

## 2023-05-02 ENCOUNTER — ANESTHESIA EVENT (OUTPATIENT)
Dept: PERIOP | Facility: HOSPITAL | Age: 48
End: 2023-05-02

## 2023-05-02 NOTE — ANESTHESIA PREPROCEDURE EVALUATION
Procedure:  EXOSTECTOMY OF FOOT (Right: Foot)    Relevant Problems   ANESTHESIA   (+) PONV (postoperative nausea and vomiting)      GI/HEPATIC   (+) Bariatric surgery status      GYN   (+) Status post laparoscopic supracervical hysterectomy      HEMATOLOGY   (+) Anemia, iron deficiency      MUSCULOSKELETAL   (+) Coccydynia      PULMONARY   (+) Upper respiratory tract infection      Digestive   (+) Postsurgical malabsorption        Physical Exam    Airway    Mallampati score: II  TM Distance: >3 FB  Neck ROM: full     Dental   Comment: Lower retainer,     Cardiovascular  Cardiovascular exam normal    Pulmonary  Pulmonary exam normal     Other Findings        Anesthesia Plan  ASA Score- 2     Anesthesia Type- IV sedation with anesthesia with ASA Monitors  Additional Monitors:   Airway Plan:           Plan Factors-Exercise tolerance (METS): >4 METS  Chart reviewed  Existing labs reviewed  Patient summary reviewed  Patient is not a current smoker  Patient not instructed to abstain from smoking on day of procedure  Patient did not smoke on day of surgery  Induction-     Postoperative Plan- Plan for postoperative opioid use  Informed Consent- Anesthetic plan and risks discussed with patient and spouse  I personally reviewed this patient with the CRNA  Discussed and agreed on the Anesthesia Plan with the CRNA             Lab Results   Component Value Date    HGBA1C 5 3 04/25/2021       Lab Results   Component Value Date     09/10/2014    K 4 0 05/07/2022     05/07/2022    CO2 30 05/07/2022    ANIONGAP 5 09/10/2014    BUN 15 05/07/2022    CREATININE 0 66 05/07/2022    GLUCOSE 88 09/10/2014    GLUF 83 05/07/2022    CALCIUM 9 0 05/07/2022    AST 14 05/07/2022    ALT 22 05/07/2022    ALKPHOS 77 05/07/2022    PROT 6 6 09/10/2014    BILITOT 1 1 (H) 09/10/2014    EGFR 106 05/07/2022       Lab Results   Component Value Date    WBC 5 12 05/07/2022    HGB 14 8 05/07/2022    HCT 46 2 (H) 05/07/2022    MCV 94 05/07/2022     05/07/2022

## 2023-05-03 ENCOUNTER — APPOINTMENT (OUTPATIENT)
Dept: RADIOLOGY | Facility: HOSPITAL | Age: 48
End: 2023-05-03

## 2023-05-03 ENCOUNTER — ANESTHESIA (OUTPATIENT)
Dept: PERIOP | Facility: HOSPITAL | Age: 48
End: 2023-05-03

## 2023-05-03 ENCOUNTER — HOSPITAL ENCOUNTER (OUTPATIENT)
Facility: HOSPITAL | Age: 48
Setting detail: OUTPATIENT SURGERY
Discharge: HOME | End: 2023-05-03
Attending: PODIATRIST | Admitting: PODIATRIST

## 2023-05-03 VITALS
SYSTOLIC BLOOD PRESSURE: 100 MMHG | OXYGEN SATURATION: 97 % | HEIGHT: 67 IN | DIASTOLIC BLOOD PRESSURE: 64 MMHG | RESPIRATION RATE: 16 BRPM | TEMPERATURE: 99.1 F | HEART RATE: 58 BPM | WEIGHT: 155 LBS | BODY MASS INDEX: 24.33 KG/M2

## 2023-05-03 RX ORDER — MIDAZOLAM HYDROCHLORIDE 2 MG/2ML
INJECTION, SOLUTION INTRAMUSCULAR; INTRAVENOUS AS NEEDED
Status: DISCONTINUED | OUTPATIENT
Start: 2023-05-03 | End: 2023-05-03

## 2023-05-03 RX ORDER — SODIUM CHLORIDE, SODIUM LACTATE, POTASSIUM CHLORIDE, CALCIUM CHLORIDE 600; 310; 30; 20 MG/100ML; MG/100ML; MG/100ML; MG/100ML
50 INJECTION, SOLUTION INTRAVENOUS CONTINUOUS
Status: DISCONTINUED | OUTPATIENT
Start: 2023-05-03 | End: 2023-05-03 | Stop reason: HOSPADM

## 2023-05-03 RX ORDER — PROPOFOL 10 MG/ML
INJECTION, EMULSION INTRAVENOUS AS NEEDED
Status: DISCONTINUED | OUTPATIENT
Start: 2023-05-03 | End: 2023-05-03

## 2023-05-03 RX ORDER — CEFAZOLIN SODIUM 2 G/50ML
2000 SOLUTION INTRAVENOUS ONCE
Status: COMPLETED | OUTPATIENT
Start: 2023-05-03 | End: 2023-05-03

## 2023-05-03 RX ORDER — HYDROMORPHONE HCL IN WATER/PF 6 MG/30 ML
0.2 PATIENT CONTROLLED ANALGESIA SYRINGE INTRAVENOUS
Status: DISCONTINUED | OUTPATIENT
Start: 2023-05-03 | End: 2023-05-03 | Stop reason: HOSPADM

## 2023-05-03 RX ORDER — ACETAMINOPHEN 325 MG/1
650 TABLET ORAL EVERY 6 HOURS PRN
Status: DISCONTINUED | OUTPATIENT
Start: 2023-05-03 | End: 2023-05-03 | Stop reason: HOSPADM

## 2023-05-03 RX ORDER — DEXAMETHASONE SODIUM PHOSPHATE 10 MG/ML
INJECTION, SOLUTION INTRAMUSCULAR; INTRAVENOUS AS NEEDED
Status: DISCONTINUED | OUTPATIENT
Start: 2023-05-03 | End: 2023-05-03

## 2023-05-03 RX ORDER — METOCLOPRAMIDE HYDROCHLORIDE 5 MG/ML
10 INJECTION INTRAMUSCULAR; INTRAVENOUS ONCE AS NEEDED
Status: DISCONTINUED | OUTPATIENT
Start: 2023-05-03 | End: 2023-05-03 | Stop reason: HOSPADM

## 2023-05-03 RX ORDER — ONDANSETRON 2 MG/ML
INJECTION INTRAMUSCULAR; INTRAVENOUS AS NEEDED
Status: DISCONTINUED | OUTPATIENT
Start: 2023-05-03 | End: 2023-05-03

## 2023-05-03 RX ORDER — CHLORHEXIDINE GLUCONATE 0.12 MG/ML
15 RINSE ORAL ONCE
Status: COMPLETED | OUTPATIENT
Start: 2023-05-03 | End: 2023-05-03

## 2023-05-03 RX ORDER — MAGNESIUM HYDROXIDE 1200 MG/15ML
LIQUID ORAL AS NEEDED
Status: DISCONTINUED | OUTPATIENT
Start: 2023-05-03 | End: 2023-05-03 | Stop reason: HOSPADM

## 2023-05-03 RX ORDER — FENTANYL CITRATE/PF 50 MCG/ML
25 SYRINGE (ML) INJECTION
Status: DISCONTINUED | OUTPATIENT
Start: 2023-05-03 | End: 2023-05-03 | Stop reason: HOSPADM

## 2023-05-03 RX ORDER — FENTANYL CITRATE 50 UG/ML
INJECTION, SOLUTION INTRAMUSCULAR; INTRAVENOUS AS NEEDED
Status: DISCONTINUED | OUTPATIENT
Start: 2023-05-03 | End: 2023-05-03

## 2023-05-03 RX ORDER — PROPOFOL 10 MG/ML
INJECTION, EMULSION INTRAVENOUS CONTINUOUS PRN
Status: DISCONTINUED | OUTPATIENT
Start: 2023-05-03 | End: 2023-05-03

## 2023-05-03 RX ADMIN — DEXAMETHASONE SODIUM PHOSPHATE 10 MG: 10 INJECTION, SOLUTION INTRAMUSCULAR; INTRAVENOUS at 10:31

## 2023-05-03 RX ADMIN — SODIUM CHLORIDE, SODIUM LACTATE, POTASSIUM CHLORIDE, AND CALCIUM CHLORIDE: .6; .31; .03; .02 INJECTION, SOLUTION INTRAVENOUS at 10:23

## 2023-05-03 RX ADMIN — ONDANSETRON 4 MG: 2 INJECTION INTRAMUSCULAR; INTRAVENOUS at 10:34

## 2023-05-03 RX ADMIN — PROPOFOL 30 MG: 10 INJECTION, EMULSION INTRAVENOUS at 10:32

## 2023-05-03 RX ADMIN — CHLORHEXIDINE GLUCONATE 0.12% ORAL RINSE 15 ML: 1.2 LIQUID ORAL at 08:13

## 2023-05-03 RX ADMIN — FENTANYL CITRATE 50 MCG: 50 INJECTION, SOLUTION INTRAMUSCULAR; INTRAVENOUS at 10:28

## 2023-05-03 RX ADMIN — MIDAZOLAM 2 MG: 1 INJECTION INTRAMUSCULAR; INTRAVENOUS at 10:23

## 2023-05-03 RX ADMIN — CEFAZOLIN SODIUM 2000 MG: 2 SOLUTION INTRAVENOUS at 10:30

## 2023-05-03 RX ADMIN — FENTANYL CITRATE 25 MCG: 50 INJECTION, SOLUTION INTRAMUSCULAR; INTRAVENOUS at 10:33

## 2023-05-03 RX ADMIN — PROPOFOL 30 MG: 10 INJECTION, EMULSION INTRAVENOUS at 10:31

## 2023-05-03 RX ADMIN — PROPOFOL 100 MCG/KG/MIN: 10 INJECTION, EMULSION INTRAVENOUS at 10:31

## 2023-05-03 RX ADMIN — FENTANYL CITRATE 25 MCG: 50 INJECTION, SOLUTION INTRAMUSCULAR; INTRAVENOUS at 10:37

## 2023-05-03 NOTE — NURSING NOTE
Pt is awake,alert,tolerated diet, written and verbal instructions given to pt and her S/O, who verbalizes an understanding

## 2023-05-03 NOTE — INTERVAL H&P NOTE
H&P reviewed  After examining the patient I find no changes in the patients condition since the H&P had been written      Vitals:    05/03/23 0800   BP: 126/66   Pulse: 72   Resp: 18   Temp: 99 5 °F (37 5 °C)   SpO2: 99%

## 2023-05-03 NOTE — ANESTHESIA POSTPROCEDURE EVALUATION
Post-Op Assessment Note    CV Status:  Stable  Pain Score: 0    Pain management: adequate     Mental Status:  Alert and awake   Hydration Status:  Stable   PONV Controlled:  None   Airway Patency:  Patent      Post Op Vitals Reviewed: Yes      Staff: CRNA         No notable events documented      /73 (05/03/23 1056)    Temp 98 5 °F (36 9 °C) (05/03/23 1056)    Pulse 76 (05/03/23 1056)   Resp 16 (05/03/23 1056)    SpO2 97 % (05/03/23 1056)

## 2023-05-03 NOTE — DISCHARGE INSTR - AVS FIRST PAGE
Dr Dino Dooley  Post-Operative Instructions    Pain / Swelling  There is expected to be some discomfort, swelling and bruising of the foot  You might see some blood on the bandage  This is not a cause for alarm  However, if there is active or persistent bleeding (blood running out of the bandage while at rest) - call the office at once  Apply an ice bag to right ankle for 30 minutes for each waking hour, for the first 72 hours  This should be discontinued when sleeping  This will also work through your cast if you have one  Ice must not leak and wet the dressings  Also, using the ice inappropriately can cause permanent nerve damage and frostbite  Your foot should be elevated as much as possible for the first 72 hours  The foot should be above heart level  If your foot is below heart level, throbbing and pain will increase  When sleeping, elevation can be accomplished by putting a small hard suitcase between the box spring and mattress at the foot of the bed  Walking and standing will increase pain, throbbing and bleeding  Persistent pain despite elevation and your pain meds can many times be relieved by removing the tight brown compression layer (called the ACE wrap) that is over the white gauze dressing  If you are elevating and taking your pain meds and pain is still severe, remove this brown stretchy layer but leave the gauze intact  Wait 30 minutes  If the pain subsides, reapply the ACE so its not so tight  If pain doesnt get better, call your doctor  Dressings / Casts  Do not remove your surgical dressings - they will be changed at your doctor appointment  Do not allow surgical dressings to get wet  Sponge baths should be used until the sutures are removed  Do not try to keep the foot dry using a garbage bag and tape - this rarely works  If you get your dressings or cast wet - call your doctor immediately  If your cast or dressings feel tight - elevate your foot for 30 minutes   If this doesnt help and you feel tingling or see toe discoloration - call your doctor  Do not put things in your cast such as powder, coat hangers to scratch, etc  This can cause skin damage and infections  Infection  If you have a fever at or above 100 degrees, chills, sweats, or notice red streaks rising above the dressing or smell odor / see pus (creamy white drainage), call your doctor immediately  Constipation  If you have severe constipation after surgery, this can be due to the pain medication  Notify your doctor and special medication will be prescribed to deal with this  Numbness  It is normal for your foot to be numb until about dinner time  If youve had a popliteal block procedure, you might be numb until the following day  When you start to feel pins and needles in the foot - this means the block is wearing off  That is the appropriate time to take your pain medication  Pain Medication  Take your post-operative pain medication as directed by your surgeon  Do not supplement your pain medication with over the counter drugs, old leftover pain medications, or extra Tylenol  You must discuss any additional medications with your doctor prior to taking them for pain  Driving  No driving is allowed without discussion with the doctor  Ambulation  Weight bear as tolerated to surgical foot  If given a flat, stiff shoe / darco wedge shoe, Do not walk at all without it    Use a device (cane, walker, crutches) to take some weight off of the foot when walking

## 2023-05-03 NOTE — OP NOTE
OPERATIVE REPORT - Podiatry  PATIENT NAME: Ana Laura Shaikh    :  1975  MRN: [id number]  Pt Location:  OR ROOM 12    SURGERY DATE: 5/3/2023    Surgeon(s) and Role:     * Ava Jimenez DPM - Primary     * Breck Dubin, DPM - Assisting    Pre-op Diagnosis:  Enthesopathy, unspecified [M77 9]    Post-Op Diagnosis Codes:     * Enthesopathy, unspecified [M77 9]    Procedure(s) (LRB):  EXOSTECTOMY OF FOOT (Right)    Specimen(s):  * No specimens in log *    Estimated Blood Loss:   0 mL    Drains:  * No LDAs found *    Anesthesia Type:   IV Sedation with Anesthesia with 10 ml of 1% Lidocaine and 0 5% Bupivacaine in a 1:1 mixture prior to incision  Hemostasis:  Pneumatic ankle tourniquet, anatomic dissection, electrocautery    Materials:  Vicryl  Nylon    Operative Findings:  Consistent with diagnosis    Complications:   None    Procedure and Technique:     Under mild sedation, the patient was brought into the operating room and remained on the stretcher in the supine position  IV sedation was achieved by anesthesia team and a universal timeout was performed where all parties are in agreement of correct patient, correct procedure and correct site  A pneumatic tourniquet was then placed over the patient's right lower extremity with ample padding  A local block was performed consisting of 10 ml of 1% Lidocaine and 0 5% Bupivacaine in a 1:1 mixture  The foot was then prepped and draped in the usual aseptic manner  An esmarch bandage was used to exsangunate the foot and the pneumatic tourniquet was then inflated to 250mmHg  Attention was directed to the right foot  Utilizing a 15 blade a linear incision was made through skin and subcutaneous tissue overlying the first TMTJ  The neurovascular structures and the extensor tendon were retracted from the surgical field  The bony exostosis was palpated and soft tissue was freed circumferentially utilizing a 15 blade    The prominence was then removed utilizing a rongeur "and rasped smooth  The surgical incision was irrigated with copious amounts of normal sterile saline  Subcutaneous closure was obtained utilizing Vicryl  Skin edges were reapproximated and closure was obtained utilizing Nylon  The foot was then cleansed and dried  The incision site was dressed with Betadine soaked adaptic and a dry sterile dressing  The tourniquet was deflated and normal hyperemic flush was noted to all digits  The patient tolerated the procedure and anesthesia well and was transported to the PACU with vital signs stable  Dr Homero Palacios was present during the entire procedure and participated in all key aspects  SIGNATURE: Ludivina Lindquist DPM  DATE: May 3, 2023  TIME: 10:56 AM      Portions of the record may have been created with voice recognition software  Occasional wrong word or \"sound a like\" substitutions may have occurred due to the inherent limitations of voice recognition software  Read the chart carefully and recognize, using context, where substitutions have occurred        "

## 2023-05-04 ENCOUNTER — OFFICE VISIT (OUTPATIENT)
Dept: INTERNAL MEDICINE CLINIC | Facility: OTHER | Age: 48
End: 2023-05-04

## 2023-05-04 VITALS
OXYGEN SATURATION: 99 % | DIASTOLIC BLOOD PRESSURE: 76 MMHG | WEIGHT: 155 LBS | HEIGHT: 67 IN | RESPIRATION RATE: 18 BRPM | BODY MASS INDEX: 24.33 KG/M2 | SYSTOLIC BLOOD PRESSURE: 100 MMHG | TEMPERATURE: 99.2 F | HEART RATE: 76 BPM

## 2023-05-04 DIAGNOSIS — J43.9 BULLA, LUNG (HCC): ICD-10-CM

## 2023-05-04 DIAGNOSIS — Z13.220 ENCOUNTER FOR LIPID SCREENING FOR CARDIOVASCULAR DISEASE: ICD-10-CM

## 2023-05-04 DIAGNOSIS — Z13.6 ENCOUNTER FOR LIPID SCREENING FOR CARDIOVASCULAR DISEASE: ICD-10-CM

## 2023-05-04 DIAGNOSIS — E55.9 VITAMIN D DEFICIENCY: ICD-10-CM

## 2023-05-04 DIAGNOSIS — D50.8 OTHER IRON DEFICIENCY ANEMIA: ICD-10-CM

## 2023-05-04 DIAGNOSIS — Z00.00 ANNUAL PHYSICAL EXAM: Primary | ICD-10-CM

## 2023-05-04 RX ORDER — IBUPROFEN 600 MG/1
TABLET ORAL
COMMUNITY
Start: 2023-05-02 | End: 2023-05-04

## 2023-05-04 RX ORDER — OXYCODONE HYDROCHLORIDE AND ACETAMINOPHEN 5; 325 MG/1; MG/1
1 TABLET ORAL EVERY 6 HOURS PRN
COMMUNITY
Start: 2023-05-02

## 2023-05-04 RX ORDER — GABAPENTIN 100 MG/1
100 CAPSULE ORAL 3 TIMES DAILY
COMMUNITY
Start: 2023-05-02

## 2023-05-04 NOTE — ASSESSMENT & PLAN NOTE
Discussed continuing diet and exercise  She is up-to-date on colorectal cancer screening  She is up-to-date on breast cancer screening  She is up-to-date on cervical cancer screening

## 2023-05-04 NOTE — PROGRESS NOTES
ADULT ANNUAL [address] [facility] PRIMARY CARE [place]    NAME: Nico Perez  AGE: 52 y o  SEX: female  : 1975     DATE: 2023     Assessment and Plan:     Problem List Items Addressed This Visit        Respiratory    Bulla, lung ([address] )       Other    Anemia, iron deficiency    Relevant Orders    CBC    Comprehensive metabolic panel    Iron Panel (Includes Ferritin, Iron Sat%, Iron, and TIBC)    Annual physical exam - Primary     Discussed continuing diet and exercise  She is up-to-date on colorectal cancer screening  She is up-to-date on breast cancer screening  She is up-to-date on cervical cancer screening  Vitamin D deficiency    Relevant Orders    Vitamin D 25 hydroxy   Other Visit Diagnoses     Encounter for lipid screening for cardiovascular disease        Relevant Orders    Lipid panel          Immunizations and preventive care screenings were discussed with patient today  Appropriate education was printed on patient's after visit summary  Counseling:  Alcohol/drug use: discussed moderation in alcohol intake, the recommendations for healthy alcohol use, and avoidance of illicit drug use  Dental Health: discussed importance of regular tooth brushing, flossing, and dental visits  Injury prevention: discussed safety/seat belts, safety helmets, smoke detectors, carbon dioxide detectors, and smoking near bedding or upholstery  Sexual health: discussed sexually transmitted diseases, partner selection, use of condoms, avoidance of unintended pregnancy, and contraceptive alternatives  Exercise: the importance of regular exercise/physical activity was discussed  Recommend exercise 3-5 times per week for at least 30 minutes  Depression Screening and Follow-up Plan: Patient was screened for depression during today's encounter  They screened negative with a PHQ-2 score of 0          Return in about 1 year (around 2024) for Annual physical      Chief Complaint:     Chief Complaint   Patient presents with    Physical Exam         phq      History of Present Illness:     Adult Annual Physical   Patient here for a comprehensive physical exam  The patient reports no problems  Diet and Physical Activity  Diet/Nutrition: well balanced diet, consuming 3-5 servings of fruits/vegetables daily, adequate fiber intake and adequate whole grain intake  Exercise: no formal exercise  Depression Screening  PHQ-2/9 Depression Screening    Little interest or pleasure in doing things: 0 - not at all  Feeling down, depressed, or hopeless: 0 - not at all  PHQ-2 Score: 0  PHQ-2 Interpretation: Negative depression screen       General Health  Sleep: sleeps well and gets 7-8 hours of sleep on average  Hearing: normal - bilateral   Vision: no vision problems, goes for regular eye exams, most recent eye exam <1 year ago and wears glasses  Dental: regular dental visits, brushes teeth twice daily and flosses teeth occasionally  /GYN Health  Patient is: postmenopausal, s/p hysterectomy     Review of Systems:     Review of Systems   Constitutional: Negative for activity change, appetite change, chills, diaphoresis, fatigue and fever  HENT: Negative for congestion, postnasal drip, rhinorrhea, sinus pressure, sinus pain, sneezing and sore throat  Eyes: Negative for visual disturbance  Respiratory: Negative for apnea, cough, choking, chest tightness, shortness of breath and wheezing  Cardiovascular: Negative for chest pain, palpitations and leg swelling  Gastrointestinal: Negative for abdominal distention, abdominal pain, anal bleeding, blood in stool, constipation, diarrhea, nausea and vomiting  Endocrine: Negative for cold intolerance and heat intolerance  Genitourinary: Positive for enuresis  Negative for difficulty urinating, dysuria and hematuria  Musculoskeletal: Negative  Skin: Negative      Neurological: Negative for dizziness, weakness, light-headedness, numbness and headaches  Hematological: Negative for adenopathy  Psychiatric/Behavioral: Negative for agitation, sleep disturbance and suicidal ideas  All other systems reviewed and are negative  Past Medical History:     Past Medical History:   Diagnosis Date    Anemia     iron def    [address] [facility] [address] ([facility] [address] )     Clotting disorder ([facility] [address] )     AVM    Coccydynia     Family history of vitamin B12 deficiency     Fibroid, uterine     Headache(784 0)     Hematuria     last assessed: 2013    History of hypertension     History of morbid obesity     History of vitamin B deficiency     History of vitamin D deficiency     Hypocalcemia     Iron deficiency     Kyphosis deformity of spine     Menopause     Migraine     Narrow angle glaucoma suspect of both eyes     PONV (postoperative nausea and vomiting)     nausea only    Postgastrectomy malabsorption     Seasonal allergies     Vitamin A deficiency     last assessed: 2014    Wears glasses       Past Surgical History:     Past Surgical History:   Procedure Laterality Date    APPENDECTOMY      AUGMENTATION MAMMAPLASTY Bilateral 2018    BONE EXOSTOSIS EXCISION Right 5/3/2023    Procedure: EXOSTECTOMY OF FOOT;  Surgeon: Clarence Travis DPM;  Location: [address] [place] [address];  Service: Podiatry   [address]    AVM    BREAST BIOPSY Left 10/12/218    x2    BUNIONECTOMY Right 10/02/2019    Procedure: EXOSTECTOMY RIGHT FOOT;  Surgeon: Clarence Travis DPM;  Location: [address] [place] [address];  Service: Podiatry     SECTION      COLONOSCOPY W/ POLYPECTOMY  2022    5 YEAR RECOMMENDED F/U    COSMETIC SURGERY      tummy tuck,breast implants and lift    ENDOMETRIAL ABLATION  2018    HYSTERECTOMY  2019    MO LAPS SUPRACRV HYSTERECT 250 GM/< RMVL TUBE/OVAR N/A 2019    Procedure: HYST[address]Y [address]COPIC SUPRACERVICAL ([address])  WITH B/L SALPINGECTOMY   EXCISION OF ENDOMETRIAL CYST;  Surgeon: Raven Avina DO;  Location: [facility];  Service: Gynecology    REFRACTIVE SURGERY      glaucoma    HITESH-EN-Y PROCEDURE  2014    TONSILLECTOMY AND ADENOIDECTOMY      including adenoids    TOOTH EXTRACTION      TUBAL LIGATION      US GUIDANCE BREAST BIOPSY LEFT EACH ADDITIONAL Left 10/12/2018    US GUIDED BREAST BIOPSY LEFT COMPLETE Left 10/12/2018      Social History:     Social History     Socioeconomic History    Marital status: Single     Spouse name: None    Number of children: None    Years of education: None    Highest education level: None   Occupational History    None   Tobacco Use    Smoking status: Never    Smokeless tobacco: Never   Vaping Use    Vaping Use: Never used   Substance and Sexual Activity    Alcohol use:  Yes     Alcohol/week: 1 0 standard drink     Types: 1 Standard drinks or equivalent per week     Comment: social    Drug use: No    Sexual activity: Yes     Partners: Male     Birth control/protection: Female Sterilization, Other     Comment: hysterectomy   Other Topics Concern    None   Social History Narrative    None     Social Determinants of Health     Financial Resource Strain: Not on file   Food Insecurity: Not on file   Transportation Needs: Not on file   Physical Activity: Not on file   Stress: Not on file   Social Connections: Not on file   Intimate Partner Violence: Not on file   Housing Stability: Not on file      Family History:     Family History   Problem Relation Age of Onset    Hypertension Mother     Atrial fibrillation Mother     Melanoma Mother     Cancer Mother         Melanoma   Carolyn Butts' disease Mother     Lung cancer Mother     Colon polyps Mother     Thyroid disease Mother     Hypertension Father     Atrial fibrillation Father     Diabetes Father         Type 2    Obesity Father     Allergies Father     Heart disease Father     Colon polyps Father     Diverticulitis Father     No Known Problems Daughter     No Known Problems "Maternal Grandmother     Cancer Maternal Grandfather         Lung    Lung cancer Maternal Grandfather     Stroke Paternal Grandmother     Cancer Paternal Grandfather         Lung    Lung cancer Paternal Grandfather     Substance Abuse Brother     Stomach cancer Maternal Aunt     Other Maternal Aunt         GIST, malignant-per [employer]    Stomach cancer Paternal Aunt     No Known Problems Paternal Aunt     No Known Problems Paternal Aunt     No Known Problems Paternal Aunt     No Known Problems Paternal Aunt     Depression Neg Hx     Anxiety disorder Neg Hx     Drug abuse Neg Hx     Breast cancer Neg Hx       Current Medications:     Current Outpatient Medications   Medication Sig Dispense Refill    gabapentin (NEURONTIN) 100 mg capsule Take 100 mg by mouth 3 (three) times a day      oxyCODONE-acetaminophen (PERCOCET) 5-325 mg per tablet Take 1 tablet by mouth every 6 (six) hours as needed       No current facility-administered medications for this visit  Allergies: Allergies   Allergen Reactions    Pollen Extract Allergic Rhinitis    Suture Other (See Comments)     Red and break open      Physical Exam:     /76 (BP Location: Left arm, Patient Position: Sitting, Cuff Size: Standard)   Pulse 76   Temp 99 2 °F (37 3 °C) (Temporal)   Resp 18   Ht 5' 7\" (1 702 m)   Wt 70 3 kg (155 lb)   LMP 09/09/2019   SpO2 99%   BMI 24 28 kg/m²     Physical Exam  Vitals and nursing note reviewed  Constitutional:       General: She is not in acute distress  Appearance: Normal appearance  She is normal weight  She is not ill-appearing  HENT:      Head: Normocephalic and atraumatic  Right Ear: Tympanic membrane, ear canal and external ear normal  There is no impacted cerumen  Left Ear: Tympanic membrane, ear canal and external ear normal  There is no impacted cerumen  Nose: Nose normal  No congestion or rhinorrhea        Mouth/Throat:      Mouth: Mucous membranes are " moist       Pharynx: Oropharynx is clear  No oropharyngeal exudate or posterior oropharyngeal erythema  Eyes:      General: No scleral icterus  Right eye: No discharge  Left eye: No discharge  Extraocular Movements: Extraocular movements intact  Conjunctiva/sclera: Conjunctivae normal       Pupils: Pupils are equal, round, and reactive to light  Neck:      Vascular: No carotid bruit  Cardiovascular:      Rate and Rhythm: Normal rate and regular rhythm  Pulses: Normal pulses  Heart sounds: Normal heart sounds  No murmur heard  No friction rub  No gallop  Pulmonary:      Effort: Pulmonary effort is normal  No respiratory distress  Breath sounds: Normal breath sounds  No stridor  No wheezing, rhonchi or rales  Chest:      Chest wall: No tenderness  Abdominal:      General: Abdomen is flat  Bowel sounds are normal  There is no distension  Palpations: Abdomen is soft  There is no mass  Tenderness: There is no abdominal tenderness  Hernia: No hernia is present  Musculoskeletal:         General: No swelling, tenderness, deformity or signs of injury  Normal range of motion  Cervical back: Normal range of motion and neck supple  No rigidity  No muscular tenderness  Right lower leg: No edema  Left lower leg: No edema  Lymphadenopathy:      Cervical: No cervical adenopathy  Skin:     General: Skin is warm and dry  Capillary Refill: Capillary refill takes less than 2 seconds  Coloration: Skin is not jaundiced or pale  Findings: No bruising, erythema, lesion or rash  Neurological:      General: No focal deficit present  Mental Status: She is alert and oriented to person, place, and time  Mental status is at baseline  Cranial Nerves: No cranial nerve deficit  Sensory: No sensory deficit  Motor: No weakness        Coordination: Coordination normal       Gait: Gait normal       Deep Tendon Reflexes: Reflexes normal    Psychiatric:         Mood and Affect: Mood normal          Behavior: Behavior normal          Thought Content:  Thought content normal          Judgment: Judgment normal           MD Hoang Sommers [facility] [place]

## 2023-05-07 ENCOUNTER — APPOINTMENT (OUTPATIENT)
Dept: LAB | Age: 48
End: 2023-05-07

## 2023-05-07 DIAGNOSIS — Z13.220 ENCOUNTER FOR LIPID SCREENING FOR CARDIOVASCULAR DISEASE: ICD-10-CM

## 2023-05-07 DIAGNOSIS — E55.9 VITAMIN D DEFICIENCY: ICD-10-CM

## 2023-05-07 DIAGNOSIS — D50.8 OTHER IRON DEFICIENCY ANEMIA: ICD-10-CM

## 2023-05-07 DIAGNOSIS — Z13.6 ENCOUNTER FOR LIPID SCREENING FOR CARDIOVASCULAR DISEASE: ICD-10-CM

## 2023-05-07 LAB
25(OH)D3 SERPL-MCNC: 15.6 NG/ML (ref 30–100)
ALBUMIN SERPL BCP-MCNC: 3.4 G/DL (ref 3.5–5)
ALP SERPL-CCNC: 78 U/L (ref 46–116)
ALT SERPL W P-5'-P-CCNC: 17 U/L (ref 12–78)
ANION GAP SERPL CALCULATED.3IONS-SCNC: -1 MMOL/L (ref 4–13)
AST SERPL W P-5'-P-CCNC: 16 U/L (ref 5–45)
BILIRUB SERPL-MCNC: 0.7 MG/DL (ref 0.2–1)
BUN SERPL-MCNC: 13 MG/DL (ref 5–25)
CALCIUM ALBUM COR SERPL-MCNC: 9.1 MG/DL (ref 8.3–10.1)
CALCIUM SERPL-MCNC: 8.6 MG/DL (ref 8.3–10.1)
CHLORIDE SERPL-SCNC: 107 MMOL/L (ref 96–108)
CHOLEST SERPL-MCNC: 181 MG/DL
CO2 SERPL-SCNC: 30 MMOL/L (ref 21–32)
CREAT SERPL-MCNC: 0.64 MG/DL (ref 0.6–1.3)
ERYTHROCYTE [DISTWIDTH] IN BLOOD BY AUTOMATED COUNT: 13.8 % (ref 11.6–15.1)
FERRITIN SERPL-MCNC: 7 NG/ML (ref 8–388)
GFR SERPL CREATININE-BSD FRML MDRD: 106 ML/MIN/1.73SQ M
GLUCOSE P FAST SERPL-MCNC: 94 MG/DL (ref 65–99)
HCT VFR BLD AUTO: 44.4 % (ref 34.8–46.1)
HDLC SERPL-MCNC: 61 MG/DL
HGB BLD-MCNC: 14.5 G/DL (ref 11.5–15.4)
IRON SATN MFR SERPL: 19 % (ref 15–50)
IRON SERPL-MCNC: 85 UG/DL (ref 50–170)
LDLC SERPL CALC-MCNC: 102 MG/DL (ref 0–100)
MCH RBC QN AUTO: 29.4 PG (ref 26.8–34.3)
MCHC RBC AUTO-ENTMCNC: 32.7 G/DL (ref 31.4–37.4)
MCV RBC AUTO: 90 FL (ref 82–98)
NONHDLC SERPL-MCNC: 120 MG/DL
PLATELET # BLD AUTO: 247 THOUSANDS/UL (ref 149–390)
PMV BLD AUTO: 10.2 FL (ref 8.9–12.7)
POTASSIUM SERPL-SCNC: 4.5 MMOL/L (ref 3.5–5.3)
PROT SERPL-MCNC: 6.5 G/DL (ref 6.4–8.4)
RBC # BLD AUTO: 4.94 MILLION/UL (ref 3.81–5.12)
SODIUM SERPL-SCNC: 136 MMOL/L (ref 135–147)
TIBC SERPL-MCNC: 443 UG/DL (ref 250–450)
TRIGL SERPL-MCNC: 92 MG/DL
WBC # BLD AUTO: 4.78 THOUSAND/UL (ref 4.31–10.16)

## 2023-05-09 DIAGNOSIS — E55.9 VITAMIN D DEFICIENCY: ICD-10-CM

## 2023-05-09 DIAGNOSIS — D50.8 OTHER IRON DEFICIENCY ANEMIA: ICD-10-CM

## 2023-05-09 DIAGNOSIS — Z98.84 BARIATRIC SURGERY STATUS: Primary | ICD-10-CM

## 2023-05-09 RX ORDER — ERGOCALCIFEROL 1.25 MG/1
50000 CAPSULE ORAL WEEKLY
Qty: 12 CAPSULE | Refills: 0 | Status: SHIPPED | OUTPATIENT
Start: 2023-05-09 | End: 2023-05-09

## 2023-05-09 RX ORDER — ERGOCALCIFEROL 1.25 MG/1
50000 CAPSULE ORAL
Qty: 12 CAPSULE | Refills: 0 | Status: SHIPPED | OUTPATIENT
Start: 2023-05-09

## 2023-05-09 NOTE — ADDENDUM NOTE
Addended by: [facility] on: 5/9/2023 09:01 AM     Modules accepted: Orders

## 2023-07-20 ENCOUNTER — HOSPITAL ENCOUNTER (EMERGENCY)
Facility: HOSPITAL | Age: 48
Discharge: HOME | End: 2023-07-20
Attending: EMERGENCY MEDICINE
Payer: COMMERCIAL

## 2023-07-20 ENCOUNTER — OFFICE VISIT (OUTPATIENT)
Dept: URGENT CARE | Facility: CLINIC | Age: 48
End: 2023-07-20
Payer: COMMERCIAL

## 2023-07-20 ENCOUNTER — APPOINTMENT (EMERGENCY)
Dept: CT IMAGING | Facility: HOSPITAL | Age: 48
End: 2023-07-20
Attending: EMERGENCY MEDICINE
Payer: COMMERCIAL

## 2023-07-20 VITALS
WEIGHT: 158.73 LBS | SYSTOLIC BLOOD PRESSURE: 118 MMHG | DIASTOLIC BLOOD PRESSURE: 75 MMHG | TEMPERATURE: 98 F | RESPIRATION RATE: 16 BRPM | BODY MASS INDEX: 24.86 KG/M2 | OXYGEN SATURATION: 97 % | HEART RATE: 69 BPM

## 2023-07-20 VITALS
BODY MASS INDEX: 24.33 KG/M2 | SYSTOLIC BLOOD PRESSURE: 132 MMHG | OXYGEN SATURATION: 98 % | WEIGHT: 155 LBS | HEART RATE: 67 BPM | DIASTOLIC BLOOD PRESSURE: 80 MMHG | TEMPERATURE: 98 F | HEIGHT: 67 IN | RESPIRATION RATE: 16 BRPM

## 2023-07-20 DIAGNOSIS — R10.31 RLQ ABDOMINAL PAIN: Primary | ICD-10-CM

## 2023-07-20 DIAGNOSIS — R10.9 ABDOMINAL PAIN: Primary | ICD-10-CM

## 2023-07-20 LAB
ALBUMIN SERPL BCP-MCNC: 4 G/DL (ref 3.5–5)
ALP SERPL-CCNC: 70 U/L (ref 34–104)
ALT SERPL W P-5'-P-CCNC: 10 U/L (ref 7–52)
ANION GAP SERPL CALCULATED.3IONS-SCNC: 6 MMOL/L
AST SERPL W P-5'-P-CCNC: 14 U/L (ref 13–39)
BACTERIA UR QL AUTO: ABNORMAL /HPF
BASOPHILS # BLD AUTO: 0.05 THOUSANDS/ÂΜL (ref 0–0.1)
BASOPHILS NFR BLD AUTO: 1 % (ref 0–1)
BILIRUB DIRECT SERPL-MCNC: 0.12 MG/DL (ref 0–0.2)
BILIRUB SERPL-MCNC: 0.63 MG/DL (ref 0.2–1)
BILIRUB UR QL STRIP: NEGATIVE
BUN SERPL-MCNC: 17 MG/DL (ref 5–25)
CALCIUM SERPL-MCNC: 8.6 MG/DL (ref 8.4–10.2)
CHLORIDE SERPL-SCNC: 105 MMOL/L (ref 96–108)
CLARITY UR: CLEAR
CO2 SERPL-SCNC: 26 MMOL/L (ref 21–32)
COLOR UR: YELLOW
CREAT SERPL-MCNC: 0.64 MG/DL (ref 0.6–1.3)
EOSINOPHIL # BLD AUTO: 0.12 THOUSAND/ÂΜL (ref 0–0.61)
EOSINOPHIL NFR BLD AUTO: 2 % (ref 0–6)
ERYTHROCYTE [DISTWIDTH] IN BLOOD BY AUTOMATED COUNT: 13.3 % (ref 11.6–15.1)
GFR SERPL CREATININE-BSD FRML MDRD: 105 ML/MIN/1.73SQ M
GLUCOSE SERPL-MCNC: 84 MG/DL (ref 65–140)
GLUCOSE UR STRIP-MCNC: NEGATIVE MG/DL
HCT VFR BLD AUTO: 41.7 % (ref 34.8–46.1)
HGB BLD-MCNC: 13.4 G/DL (ref 11.5–15.4)
HGB UR QL STRIP.AUTO: ABNORMAL
HYALINE CASTS #/AREA URNS LPF: ABNORMAL /LPF
IMM GRANULOCYTES # BLD AUTO: 0.02 THOUSAND/UL (ref 0–0.2)
IMM GRANULOCYTES NFR BLD AUTO: 0 % (ref 0–2)
KETONES UR STRIP-MCNC: NEGATIVE MG/DL
LEUKOCYTE ESTERASE UR QL STRIP: NEGATIVE
LIPASE SERPL-CCNC: 48 U/L (ref 11–82)
LYMPHOCYTES # BLD AUTO: 1.44 THOUSANDS/ÂΜL (ref 0.6–4.47)
LYMPHOCYTES NFR BLD AUTO: 22 % (ref 14–44)
MAGNESIUM SERPL-MCNC: 2.2 MG/DL (ref 1.9–2.7)
MCH RBC QN AUTO: 29.3 PG (ref 26.8–34.3)
MCHC RBC AUTO-ENTMCNC: 32.1 G/DL (ref 31.4–37.4)
MCV RBC AUTO: 91 FL (ref 82–98)
MONOCYTES # BLD AUTO: 0.51 THOUSAND/ÂΜL (ref 0.17–1.22)
MONOCYTES NFR BLD AUTO: 8 % (ref 4–12)
MUCOUS THREADS UR QL AUTO: ABNORMAL
NEUTROPHILS # BLD AUTO: 4.4 THOUSANDS/ÂΜL (ref 1.85–7.62)
NEUTS SEG NFR BLD AUTO: 67 % (ref 43–75)
NITRITE UR QL STRIP: NEGATIVE
NON-SQ EPI CELLS URNS QL MICRO: ABNORMAL /HPF
NRBC BLD AUTO-RTO: 0 /100 WBCS
PH UR STRIP.AUTO: 5 [PH] (ref 4.5–8)
PLATELET # BLD AUTO: 229 THOUSANDS/UL (ref 149–390)
PMV BLD AUTO: 10 FL (ref 8.9–12.7)
POTASSIUM SERPL-SCNC: 4.2 MMOL/L (ref 3.5–5.3)
PROT SERPL-MCNC: 6.4 G/DL (ref 6.4–8.4)
PROT UR STRIP-MCNC: NEGATIVE MG/DL
RBC # BLD AUTO: 4.58 MILLION/UL (ref 3.81–5.12)
RBC #/AREA URNS AUTO: ABNORMAL /HPF
SODIUM SERPL-SCNC: 137 MMOL/L (ref 135–147)
SP GR UR STRIP.AUTO: >=1.03 (ref 1–1.03)
UROBILINOGEN UR QL STRIP.AUTO: 0.2 E.U./DL
WBC # BLD AUTO: 6.54 THOUSAND/UL (ref 4.31–10.16)
WBC #/AREA URNS AUTO: ABNORMAL /HPF

## 2023-07-20 PROCEDURE — 83735 ASSAY OF MAGNESIUM: CPT | Performed by: EMERGENCY MEDICINE

## 2023-07-20 PROCEDURE — 99213 OFFICE O/P EST LOW 20 MIN: CPT | Performed by: NURSE PRACTITIONER

## 2023-07-20 PROCEDURE — 74177 CT ABD & PELVIS W/CONTRAST: CPT

## 2023-07-20 PROCEDURE — 80076 HEPATIC FUNCTION PANEL: CPT | Performed by: EMERGENCY MEDICINE

## 2023-07-20 PROCEDURE — 85025 COMPLETE CBC W/AUTO DIFF WBC: CPT | Performed by: EMERGENCY MEDICINE

## 2023-07-20 PROCEDURE — 81001 URINALYSIS AUTO W/SCOPE: CPT

## 2023-07-20 PROCEDURE — G1004 CDSM NDSC: HCPCS

## 2023-07-20 PROCEDURE — 83690 ASSAY OF LIPASE: CPT | Performed by: EMERGENCY MEDICINE

## 2023-07-20 PROCEDURE — 80048 BASIC METABOLIC PNL TOTAL CA: CPT | Performed by: EMERGENCY MEDICINE

## 2023-07-20 PROCEDURE — 36415 COLL VENOUS BLD VENIPUNCTURE: CPT | Performed by: EMERGENCY MEDICINE

## 2023-07-20 RX ADMIN — SODIUM CHLORIDE 1000 ML: 0.9 INJECTION, SOLUTION INTRAVENOUS at 16:49

## 2023-07-20 RX ADMIN — IOHEXOL 100 ML: 350 INJECTION, SOLUTION INTRAVENOUS at 18:40

## 2023-07-20 NOTE — ED PROVIDER NOTES
History  Chief Complaint   Patient presents with   • Abdominal Pain     Right abd pain starting this morning. Denies n/v/d. Gastric bypass about 8yrs ago. States urinary frequency. 51 YO female presents with RLQ abdominal pain today. States this was gradual in onset, she denies similar in the past. Patient has as history of appendectomy, hysterectomy and salpingectomy. She states she does have her ovaries. Patient was seen in an UC today and sent to the ED for further evaluation. Pt denies CP/SOB/F/C/N/V/D/C, no dysuria, burning on urination or blood in urine. History provided by:  Patient and medical records   used: No        Prior to Admission Medications   Prescriptions Last Dose Informant Patient Reported?  Taking?   ergocalciferol (VITAMIN D2) 50,000 units   No No   Sig: Take 1 capsule (50,000 Units total) by mouth every 14 (fourteen) days   gabapentin (NEURONTIN) 100 mg capsule  Self Yes No   Sig: Take 100 mg by mouth 3 (three) times a day   Patient not taking: Reported on 7/20/2023   oxyCODONE-acetaminophen (PERCOCET) 5-325 mg per tablet  Self Yes No   Sig: Take 1 tablet by mouth every 6 (six) hours as needed   Patient not taking: Reported on 7/20/2023      Facility-Administered Medications: None       Past Medical History:   Diagnosis Date   • Anemia     iron def   • Bulla of lung ([address])    • Clotting disorder ([address])     AVM   • Coccydynia    • Family history of vitamin B12 deficiency    • Fibroid, uterine    • Headache(784.0)    • Hematuria     last assessed: 12/11/2013   • History of hypertension    • History of morbid obesity    • History of vitamin B deficiency    • History of vitamin D deficiency    • Hypocalcemia    • Iron deficiency    • Kyphosis deformity of spine    • Menopause    • Migraine    • Narrow angle glaucoma suspect of both eyes    • PONV (postoperative nausea and vomiting)     nausea only   • Postgastrectomy malabsorption    • Seasonal allergies    • Vitamin A deficiency     last assessed: 11/11/2014   • Wears glasses        Past Surgical History:   Procedure Laterality Date   • APPENDECTOMY     • AUGMENTATION MAMMAPLASTY Bilateral 12/26/2018   • BONE EXOSTOSIS EXCISION Right 5/3/2023    Procedure: EXOSTECTOMY OF FOOT;  Surgeon: Irina Werner DPM;  Location: [address] [place] [address] OR;  Service: Podiatry   • [address]    AVM   • BREAST BIOPSY Left 10/12/218    x2   • BUNIONECTOMY Right 10/02/2019    Procedure: EXOSTECTOMY RIGHT FOOT;  Surgeon: Irina Werner DPM;  Location: [address] [place] [address] OR;  Service: Podiatry   • [address]   • COLONOSCOPY W/ POLYPECTOMY  07/26/2022    5 YEAR RECOMMENDED F/U   • COSMETIC SURGERY      tummy tuck,breast implants and lift   • ENDOMETRIAL ABLATION  08/2018   • HYSTERECTOMY  2019   • [place] LAPS SUPRACRV HYSTERECT 250 GM/< RMVL TUBE/OVAR N/A 09/16/2019    Procedure: HYSTERECTOMY LAPAROSCOPIC SUPRACERVICAL [facility])  WITH B/L SALPINGECTOMY.  EXCISION OF ENDOMETRIAL CYST;  Surgeon: Nito Salinas DO;  Location: [facility] OR;  Service: Gynecology   • REFRACTIVE SURGERY      glaucoma   • HITESH-EN-Y PROCEDURE  2014   • TONSILLECTOMY AND ADENOIDECTOMY      including adenoids   • TOOTH EXTRACTION     • TUBAL LIGATION     • US GUIDANCE BREAST BIOPSY LEFT EACH ADDITIONAL Left 10/12/2018   • US GUIDED BREAST BIOPSY LEFT COMPLETE Left 10/12/2018       Family History   Problem Relation Age of Onset   • Hypertension Mother    • Atrial fibrillation Mother    • Melanoma Mother    • Cancer Mother         Melanoma   • Graves' disease Mother    • Lung cancer Mother    • Colon polyps Mother    • Thyroid disease Mother    • Hypertension Father    • Atrial fibrillation Father    • Diabetes Father         Type 2   • Obesity Father    • Allergies Father    • Heart disease Father    • Colon polyps Father    • Diverticulitis Father    • No Known Problems Daughter    • No Known Problems Maternal Grandmother    • Cancer Maternal Grandfather         Lung   • Lung cancer Maternal Grandfather    • Stroke Paternal Grandmother    • Cancer Paternal Grandfather         Lung   • Lung cancer Paternal Grandfather    • Substance Abuse Brother    • Stomach cancer Maternal Aunt    • Other Maternal Aunt         GIST, malignant-per [employer]   • Stomach cancer Paternal Aunt    • No Known Problems Paternal Aunt    • No Known Problems Paternal Aunt    • No Known Problems Paternal Aunt    • No Known Problems Paternal Aunt    • Depression Neg Hx    • Anxiety disorder Neg Hx    • Drug abuse Neg Hx    • Breast cancer Neg Hx      I have reviewed and agree with the history as documented. E-Cigarette/Vaping   • E-Cigarette Use Never User      E-Cigarette/Vaping Substances   • Nicotine No    • THC No    • CBD No    • Flavoring No    • Other No    • Unknown No      Social History     Tobacco Use   • Smoking status: Never   • Smokeless tobacco: Never   Vaping Use   • Vaping Use: Never used   Substance Use Topics   • Alcohol use: Yes     Alcohol/week: 1.0 standard drink of alcohol     Types: 1 Standard drinks or equivalent per week     Comment: social   • Drug use: No       Review of Systems   Constitutional: Negative for chills, fatigue and fever. HENT: Negative for dental problem. Eyes: Negative for visual disturbance. Respiratory: Negative for shortness of breath. Cardiovascular: Negative for chest pain. Gastrointestinal: Positive for abdominal pain. Negative for diarrhea and vomiting. Genitourinary: Negative for dysuria and frequency. Musculoskeletal: Negative for arthralgias. Skin: Negative for rash. Neurological: Negative for dizziness, weakness and light-headedness. Psychiatric/Behavioral: Negative for agitation, behavioral problems and confusion. All other systems reviewed and are negative. Physical Exam  Physical Exam  Vitals and nursing note reviewed. Constitutional:       Appearance: Normal appearance. HENT:      Head: Normocephalic and atraumatic.    Eyes: Extraocular Movements: Extraocular movements intact. Conjunctiva/sclera: Conjunctivae normal.   Cardiovascular:      Rate and Rhythm: Normal rate. Pulmonary:      Effort: Pulmonary effort is normal.   Abdominal:      General: There is no distension. Tenderness: There is abdominal tenderness in the right lower quadrant. There is no guarding or rebound. Musculoskeletal:         General: Normal range of motion. Cervical back: Normal range of motion. Skin:     Findings: No rash. Neurological:      General: No focal deficit present. Mental Status: She is alert. Cranial Nerves: No cranial nerve deficit.    Psychiatric:         Mood and Affect: Mood normal.         Vital Signs  ED Triage Vitals [07/20/23 1554]   Temperature Pulse Respirations Blood Pressure SpO2   98 °F (36.7 °C) 66 16 118/79 99 %      Temp Source Heart Rate Source Patient Position - Orthostatic VS BP Location FiO2 (%)   Oral Monitor Sitting Right arm --      Pain Score       --           Vitals:    07/20/23 1554 07/20/23 1757 07/20/23 2035   BP: 118/79 128/79 118/75   Pulse: 66 60 69   Patient Position - Orthostatic VS: Sitting Lying          Visual Acuity      ED Medications  Medications   sodium chloride 0.9 % bolus 1,000 mL (0 mL Intravenous Stopped 7/20/23 2038)   iohexol (OMNIPAQUE) 350 MG/ML injection (SINGLE-DOSE) 100 mL (100 mL Intravenous Given 7/20/23 1840)       Diagnostic Studies  Results Reviewed     Procedure Component Value Units Date/Time    Basic metabolic panel [[id number]] Collected: 07/20/23 1647    Lab Status: Final result Specimen: Blood from Arm, Left Updated: 07/20/23 1717     Sodium 137 mmol/L      Potassium 4.2 mmol/L      Chloride 105 mmol/L      CO2 26 mmol/L      ANION GAP 6 mmol/L      BUN 17 mg/dL      Creatinine 0.64 mg/dL      Glucose 84 mg/dL      Calcium 8.6 mg/dL      eGFR 105 ml/min/1.73sq m     Narrative:      [place] guidelines for Chronic Kidney Disease (CKD):    •  Stage 1 with normal or high GFR (GFR > 90 mL/min/1.73 square meters)  •  Stage 2 Mild CKD (GFR = 60-89 mL/min/1.73 square meters)  •  Stage 3A Moderate CKD (GFR = 45-59 mL/min/1.73 square meters)  •  Stage 3B Moderate CKD (GFR = 30-44 mL/min/1.73 square meters)  •  Stage 4 Severe CKD (GFR = 15-29 mL/min/1.73 square meters)  •  Stage 5 End Stage CKD (GFR <15 mL/min/1.73 square meters)  Note: GFR calculation is accurate only with a steady state creatinine    Hepatic function panel [[id number]]  (Normal) Collected: 07/20/23 1647    Lab Status: Final result Specimen: Blood from Arm, Left Updated: 07/20/23 1717     Total Bilirubin 0.63 mg/dL      Bilirubin, Direct 0.12 mg/dL      Alkaline Phosphatase 70 U/L      AST 14 U/L      ALT 10 U/L      Total Protein 6.4 g/dL      Albumin 4.0 g/dL     Magnesium [[id number]]  (Normal) Collected: 07/20/23 1647    Lab Status: Final result Specimen: Blood from Arm, Left Updated: 07/20/23 1717     Magnesium 2.2 mg/dL     Lipase [[id number]]  (Normal) Collected: 07/20/23 1647    Lab Status: Final result Specimen: Blood from Arm, Left Updated: 07/20/23 1717     Lipase 48 u/L     Urine Microscopic [[id number]]  (Abnormal) Collected: 07/20/23 1634    Lab Status: Final result Specimen: Urine, Clean Catch Updated: 07/20/23 1701     RBC, UA 2-4 /hpf      WBC, UA 1-2 /hpf      Epithelial Cells Occasional /hpf      Bacteria, UA Occasional /hpf      MUCUS THREADS Occasional     Hyaline Casts, UA 0-3 /lpf     CBC and differential [[id number]] Collected: 07/20/23 1647    Lab Status: Final result Specimen: Blood from Arm, Left Updated: 07/20/23 1701     WBC 6.54 Thousand/uL      RBC 4.58 Million/uL      Hemoglobin 13.4 g/dL      Hematocrit 41.7 %      MCV 91 fL      MCH 29.3 pg      MCHC 32.1 g/dL      RDW 13.3 %      MPV 10.0 fL      Platelets 399 Thousands/uL      nRBC 0 /100 WBCs      Neutrophils Relative 67 %      Immat GRANS % 0 %      Lymphocytes Relative 22 %      Monocytes Relative 8 %      Eosinophils Relative 2 %      Basophils Relative 1 %      Neutrophils Absolute 4.40 Thousands/µL      Immature Grans Absolute 0.02 Thousand/uL      Lymphocytes Absolute 1.44 Thousands/µL      Monocytes Absolute 0.51 Thousand/µL      Eosinophils Absolute 0.12 Thousand/µL      Basophils Absolute 0.05 Thousands/µL     Urine Macroscopic, POC [[id number]]  (Abnormal) Collected: 07/20/23 1634    Lab Status: Final result Specimen: Urine Updated: 07/20/23 1640     Color, UA Yellow     Clarity, UA Clear     pH, UA 5.0     Leukocytes, UA Negative     Nitrite, UA Negative     Protein, UA Negative mg/dl      Glucose, UA Negative mg/dl      Ketones, UA Negative mg/dl      Urobilinogen, UA 0.2 E.U./dl      Bilirubin, UA Negative     Occult Blood, UA Trace     Specific Gravity, UA >=1.030    Narrative:      [facility] RESULT                 CT abdomen pelvis with contrast   Final Result by David Nolen MD (07/20 1959)      No acute CT findings in the abdomen or pelvis. 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 Status post gastric bypass. No bowel obstruction or focal inflammatory process. Additional findings as above. Workstation performed: [id number]                    Procedures  Procedures         ED Course                                             Medical Decision Making  1. RLQ pain - Starting gradually this morning, sent in for concern for ovarian torsion though history does not seem consistent with this as onset was gradual and she is in no distress. Also consideration of ovarian cyst, colitis. Will check CBC, metabolic panel for electrolyte abnormalities and dehydration, urine for infection. Will CT abdomen and pelvis. Abdominal pain: acute illness or injury  Amount and/or Complexity of Data Reviewed  Labs: ordered. Radiology: ordered. Risk  Prescription drug management.           Disposition  Final diagnoses:   Abdominal pain     Time reflects when diagnosis was documented in both MDM as applicable and the Disposition within this note     Time User Action Codes Description Comment    7/20/2023  8:14 PM Deborah Jacinto Nika [R10.9] Abdominal pain       ED Disposition     ED Disposition   Discharge    Condition   Stable    Date/Time   Thu Jul 20, 2023  8:14 PM    Comment   Kinga Sawyer discharge to home/self care. Follow-up Information     Follow up With Specialties Details Why Contact Info    Sharon Trevino MD Internal Medicine   [address] [facility]  [phone]            Discharge Medication List as of 7/20/2023  8:14 PM      CONTINUE these medications which have NOT CHANGED    Details   ergocalciferol (VITAMIN D2) 50,000 units Take 1 capsule (50,000 Units total) by mouth every 14 (fourteen) days, Starting Tue 5/9/2023, Normal      gabapentin (NEURONTIN) 100 mg capsule Take 100 mg by mouth 3 (three) times a day, Starting Tue 5/2/2023, Historical Med      oxyCODONE-acetaminophen (PERCOCET) 5-325 mg per tablet Take 1 tablet by mouth every 6 (six) hours as needed, Starting Tue 5/2/2023, Historical Med             No discharge procedures on file.     PDMP Review     None          ED Provider  Electronically Signed by           Clark Amin MD  07/20/23 1990

## 2023-07-20 NOTE — PROGRESS NOTES
[facility]        NAME: Chucky Berrios is a 50 y.o. female  : 1975    MRN: [id number]  DATE: 2023  TIME: 3:25 PM    Assessment and Plan   RLQ abdominal pain [R10.31]  1. RLQ abdominal pain          Due to worsening pain along with swelling in the right lower quadrant advised patient to proceed to ER for further work-up and evaluation. Discussed need to rule out ovarian torsion. Patient in agreement with plan and plans to go to [address] [place] [address] Transfer note sent. Patient Instructions     Follow up with PCP in 3-5 days. Proceed to  ER if symptoms worsen. Chief Complaint     Chief Complaint   Patient presents with   • Abdominal Pain     Patient with RLQ abd pain since today. States that it came on gradual. Denies any N/V/D         History of Present Illness   Chucky Berrios presents to the clinic c/o    Patient presents to the office with complaints of right lower abdominal pain. States noted a little discomfort this morning however as the day went on through work pain has become increasingly uncomfortable to the point where she is now bent over in pain. She states she had a colonoscopy last year was told she has some diverticulosis. She had her appendix removed when she was 12years old and states this does feel similar to that. She does have a history of gastric bypass and subsequent tummy tuck so she is also concerned about scar tissue. She has a history of hysterectomy however she still does have her ovaries in place. She does not get menstrual cycles due to the hysterectomy. She did have a normal bowel movement this morning. Denies any pain with bowel movements and denies any pain with urination. Denies any fevers or chills. Review of Systems   Review of Systems   All other systems reviewed and are negative.         Current Medications     Long-Term Medications   Medication Sig Dispense Refill   • ergocalciferol (VITAMIN D2) 50,000 units Take 1 capsule (50,000 Units total) by mouth every 14 (fourteen) days 12 capsule 0   • gabapentin (NEURONTIN) 100 mg capsule Take 100 mg by mouth 3 (three) times a day (Patient not taking: Reported on 7/20/2023)         Current Allergies     Allergies as of 07/20/2023 - Reviewed 07/20/2023   Allergen Reaction Noted   • Pollen extract Allergic Rhinitis 04/25/2019   • Suture Other (See Comments) 04/14/2020            The following portions of the patient's history were reviewed and updated as appropriate: allergies, current medications, past family history, past medical history, past social history, past surgical history and problem list.    Objective   /80   Pulse 67   Temp 98 °F (36.7 °C) (Tympanic)   Resp 16   Ht 5' 7" (1.702 m)   Wt 70.3 kg (155 lb)   LMP 09/09/2019   SpO2 98%   BMI 24.28 kg/m²        Physical Exam     Physical Exam  Vitals and nursing note reviewed. Constitutional:       Appearance: She is well-developed. HENT:      Head: Normocephalic and atraumatic. Eyes:      General: Lids are normal.      Conjunctiva/sclera: Conjunctivae normal.   Cardiovascular:      Rate and Rhythm: Normal rate and regular rhythm. Heart sounds: Normal heart sounds, S1 normal and S2 normal.   Pulmonary:      Effort: Pulmonary effort is normal.      Breath sounds: Normal breath sounds. Abdominal:      General: There is distension (RLQ only). Palpations: Abdomen is soft. Tenderness: There is abdominal tenderness in the right lower quadrant. There is guarding. There is no right CVA tenderness, left CVA tenderness or rebound. Skin:     General: Skin is warm and dry. Neurological:      Mental Status: She is alert and oriented to person, place, and time. Psychiatric:         Speech: Speech normal.         Behavior: Behavior normal. Behavior is cooperative. Thought Content:  Thought content normal.         Judgment: Judgment normal.

## 2023-07-21 NOTE — ED NOTES
AVS reviewed by ED provider. Pt ambulated out of dept without difficulty.      Lorna Vickers RN  07/20/23 0418

## 2023-07-24 ENCOUNTER — TELEPHONE (OUTPATIENT)
Dept: SURGERY | Facility: CLINIC | Age: 48
End: 2023-07-24

## 2023-07-24 NOTE — TELEPHONE ENCOUNTER
Note to chart:  Pt of Dr Cary Ridley was in ED on 7/20 due to pain on R side (her appendix has already been removed). Pt wanted to know if she should see Dr Cary Ridley or her PCP. Advised her per ED note, she needs to follow up with her PCP first. Pt understood.      Disposition  Final diagnoses:   Abdominal pain               Time reflects when diagnosis was documented in both MDM as applicable and the Disposition within this note      Time User Action Codes Description Comment     7/20/2023  8:14 PM Gerhardt Mosses E Add [R10.9] Abdominal pain                               ED Disposition                 ED Disposition   Discharge    Condition   Stable    Date/Time   Thu Jul 20, 2023  8:14 PM    Comment   Orlando Sandhu discharge to home/self care.                                   Follow-up Information      Follow up With Specialties Details Why Contact Info     Mago Boo MD Internal Medicine     [address]  [phone]

## 2023-07-31 ENCOUNTER — OFFICE VISIT (OUTPATIENT)
Dept: INTERNAL MEDICINE CLINIC | Facility: OTHER | Age: 48
End: 2023-07-31
Payer: COMMERCIAL

## 2023-07-31 VITALS
TEMPERATURE: 98.4 F | DIASTOLIC BLOOD PRESSURE: 64 MMHG | RESPIRATION RATE: 18 BRPM | SYSTOLIC BLOOD PRESSURE: 104 MMHG | BODY MASS INDEX: 24.48 KG/M2 | HEART RATE: 68 BPM | OXYGEN SATURATION: 97 % | WEIGHT: 156 LBS | HEIGHT: 67 IN

## 2023-07-31 DIAGNOSIS — Z98.84 BARIATRIC SURGERY STATUS: ICD-10-CM

## 2023-07-31 DIAGNOSIS — R10.31 RIGHT LOWER QUADRANT ABDOMINAL PAIN: ICD-10-CM

## 2023-07-31 DIAGNOSIS — D50.8 OTHER IRON DEFICIENCY ANEMIA: ICD-10-CM

## 2023-07-31 DIAGNOSIS — E55.9 VITAMIN D DEFICIENCY: ICD-10-CM

## 2023-07-31 DIAGNOSIS — J43.9 BULLA, LUNG (HCC): Primary | ICD-10-CM

## 2023-07-31 PROBLEM — J06.9 UPPER RESPIRATORY TRACT INFECTION: Status: RESOLVED | Noted: 2022-11-28 | Resolved: 2023-07-31

## 2023-07-31 PROBLEM — R93.89 ABNORMAL CT OF THE CHEST: Status: RESOLVED | Noted: 2017-06-23 | Resolved: 2023-07-31

## 2023-07-31 PROBLEM — Z01.818 PREOPERATIVE CLEARANCE: Status: RESOLVED | Noted: 2019-09-11 | Resolved: 2023-07-31

## 2023-07-31 PROBLEM — U09.9 POST-ACUTE SEQUELAE OF COVID-19 (PASC): Status: RESOLVED | Noted: 2022-11-28 | Resolved: 2023-07-31

## 2023-07-31 PROBLEM — R53.83 FATIGUE: Status: RESOLVED | Noted: 2020-04-14 | Resolved: 2023-07-31

## 2023-07-31 PROCEDURE — 99214 OFFICE O/P EST MOD 30 MIN: CPT | Performed by: INTERNAL MEDICINE

## 2023-07-31 RX ORDER — OMEPRAZOLE 20 MG/1
20 CAPSULE, DELAYED RELEASE ORAL DAILY
Qty: 30 CAPSULE | Refills: 0 | Status: SHIPPED | OUTPATIENT
Start: 2023-07-31

## 2023-07-31 RX ORDER — SIMETHICONE 125 MG
125 CAPSULE ORAL EVERY 6 HOURS PRN
Qty: 28 CAPSULE | Refills: 0 | Status: SHIPPED | OUTPATIENT
Start: 2023-07-31

## 2023-07-31 NOTE — ASSESSMENT & PLAN NOTE
Continue to monitor clinically and continue supportive care. Will prescribe omeprazole 20 mg daily for 30 days as well as simethicone as needed for abdominal bloating/gas. She is to contact our office for worsening symptoms. Discussed following a bland diet.

## 2023-07-31 NOTE — PROGRESS NOTES
Assessment/Plan:    Bulla, lung (HCC)  Currently asymptomatic and stable. Will refer to pulmonology. Continue to monitor clinically. Right lower quadrant abdominal pain  Continue to monitor clinically and continue supportive care. Will prescribe omeprazole 20 mg daily for 30 days as well as simethicone as needed for abdominal bloating/gas. She is to contact our office for worsening symptoms. Discussed following a bland diet. Anemia, iron deficiency  Continue vitamin and iron supplementation. Trend CBC and iron panel. Vitamin D deficiency  Continue vitamin supplementation. Diagnoses and all orders for this visit:    Bulla, lung ([address])  -     Ambulatory Referral to Pulmonology; Future    Right lower quadrant abdominal pain  -     omeprazole (PriLOSEC) 20 mg delayed release capsule; Take 1 capsule (20 mg total) by mouth daily  -     simethicone (MYLICON,GAS-X) 107 MG CAPS; Take 1 capsule (125 mg total) by mouth every 6 (six) hours as needed for flatulence (bloating)    Other iron deficiency anemia    Bariatric surgery status    Vitamin D deficiency                    Subjective:      Patient ID: Fabiola Marx is a 50 y.o. female. Chief Complaint   Patient presents with   • Follow-up     ER 7/20/23  Manny - abdominal pain. Better but did not subside for days after. Feels it could be gallbadder did have gastric bypass 9 yrs ago. 50year old female is seen today for ER follow up. She went to an urgent care and was sent to [facility] ED for abdominal pain. CT A/P showed a distended gallbladder without gallstones. She was also found to have a large bulla cystic disease of the right lower lobe. Otherwise, no acute findings. She continues to have intermittent RLQ abdominal pain. She denies any N/V/D/C. She was evaluated by pulmonology many years ago. Otherwise, she has no other complaints or concerns at this time. She follows a healthy diet.       The following portions of the patient's history were reviewed and updated as appropriate: allergies, current medications, past family history, past medical history, past social history, past surgical history and problem list.    Review of Systems   Constitutional: Negative for activity change, appetite change, chills, diaphoresis, fatigue and fever. HENT: Negative for congestion, postnasal drip, rhinorrhea, sinus pressure, sinus pain, sneezing and sore throat. Eyes: Negative for visual disturbance. Respiratory: Negative for apnea, cough, choking, chest tightness, shortness of breath and wheezing. Cardiovascular: Negative for chest pain, palpitations and leg swelling. Gastrointestinal: Negative for abdominal distention, abdominal pain, anal bleeding, blood in stool, constipation, diarrhea, nausea and vomiting. Endocrine: Negative for cold intolerance and heat intolerance. Genitourinary: Negative for difficulty urinating, dysuria and hematuria. Musculoskeletal: Negative. Skin: Negative. Neurological: Negative for dizziness, weakness, light-headedness, numbness and headaches. Hematological: Negative for adenopathy. Psychiatric/Behavioral: Negative for agitation, sleep disturbance and suicidal ideas. All other systems reviewed and are negative.         Past Medical History:   Diagnosis Date   • Anemia     iron def   • Bulla of lung ([address])    • Clotting disorder ([address])     AVM   • Coccydynia    • Family history of vitamin B12 deficiency    • Fibroid, uterine    • Headache(784.0)    • Hematuria     last assessed: 12/11/2013   • History of hypertension    • History of morbid obesity    • History of vitamin B deficiency    • History of vitamin D deficiency    • Hypocalcemia    • Iron deficiency    • Kyphosis deformity of spine    • Menopause    • Migraine    • Narrow angle glaucoma suspect of both eyes    • PONV (postoperative nausea and vomiting)     nausea only   • Postgastrectomy malabsorption    • Seasonal allergies    • Vitamin A deficiency     last assessed: 11/11/2014   • Wears glasses          Current Outpatient Medications:   •  ergocalciferol (VITAMIN D2) 50,000 units, Take 1 capsule (50,000 Units total) by mouth every 14 (fourteen) days, Disp: 12 capsule, Rfl: 0  •  omeprazole (PriLOSEC) 20 mg delayed release capsule, Take 1 capsule (20 mg total) by mouth daily, Disp: 30 capsule, Rfl: 0  •  simethicone (MYLICON,GAS-X) 033 MG CAPS, Take 1 capsule (125 mg total) by mouth every 6 (six) hours as needed for flatulence (bloating), Disp: 28 capsule, Rfl: 0    Allergies   Allergen Reactions   • Pollen Extract Allergic Rhinitis   • Suture Other (See Comments)     Red and break open       Social History   Past Surgical History:   Procedure Laterality Date   • APPENDECTOMY     • AUGMENTATION MAMMAPLASTY Bilateral 12/26/2018   • BONE EXOSTOSIS EXCISION Right 5/3/2023    Procedure: EXOSTECTOMY OF FOOT;  Surgeon: Alex Nunn DPM;  Location: [address] [place] [address];  Service: Podiatry   • [address]    AVM   • BREAST BIOPSY Left 10/12/218    x2   • BUNIONECTOMY Right 10/02/2019    Procedure: EXOSTECTOMY RIGHT FOOT;  Surgeon: Alex Nunn DPM;  Location: [address] [place] [address];  Service: Podiatry   • [address] [place] [address]   • COLONOSCOPY W/ POLYPECTOMY  07/26/2022    5 YEAR RECOMMENDED F/U   • COSMETIC SURGERY      tummy tuck,breast implants and lift   • ENDOMETRIAL ABLATION  08/2018   • HYSTERECTOMY  2019   • VA LAPS SUPRACRV HYSTERECT 250 GM/< RMVL TUBE/OVAR N/A 09/16/2019    Procedure: HYSTERECTOMY LAPAROSCOPIC SUPRACERVICAL ([address])  WITH B/L SALPINGECTOMY.  EXCISION OF ENDOMETRIAL CYST;  Surgeon: Elvia Portillo DO;  Location: [facility] OR;  Service: Gynecology   • REFRACTIVE SURGERY      glaucoma   • HITESH-EN-Y PROCEDURE  2014   • TONSILLECTOMY AND ADENOIDECTOMY      including adenoids   • TOOTH EXTRACTION     • TUBAL LIGATION     • US GUIDANCE BREAST BIOPSY LEFT EACH ADDITIONAL Left 10/12/2018   • US GUIDED BREAST BIOPSY LEFT COMPLETE Left 10/12/2018 Family History   Problem Relation Age of Onset   • Hypertension Mother    • Atrial fibrillation Mother    • Melanoma Mother    • Cancer Mother         Melanoma   • Graves' disease Mother    • Lung cancer Mother    • Colon polyps Mother    • Thyroid disease Mother    • Hypertension Father    • Atrial fibrillation Father    • Diabetes Father         Type 2   • Obesity Father    • Allergies Father    • Heart disease Father    • Colon polyps Father    • Diverticulitis Father    • No Known Problems Daughter    • No Known Problems Maternal Grandmother    • Cancer Maternal Grandfather         Lung   • Lung cancer Maternal Grandfather    • Stroke Paternal Grandmother    • Cancer Paternal Grandfather         Lung   • Lung cancer Paternal Grandfather    • Substance Abuse Brother    • Stomach cancer Maternal Aunt    • Other Maternal Aunt         GIST, malignant-per [employer]   • Stomach cancer Paternal Aunt    • No Known Problems Paternal Aunt    • No Known Problems Paternal Aunt    • No Known Problems Paternal Aunt    • No Known Problems Paternal Aunt    • Depression Neg Hx    • Anxiety disorder Neg Hx    • Drug abuse Neg Hx    • Breast cancer Neg Hx        Objective:  /64 (BP Location: Left arm, Patient Position: Sitting, Cuff Size: Standard)   Pulse 68   Temp 98.4 °F (36.9 °C) (Temporal)   Resp 18   Ht 5' 7" (1.702 m)   Wt 70.8 kg (156 lb)   LMP 09/09/2019   SpO2 97%   BMI 24.43 kg/m²     Recent Results (from the past 1344 hour(s))   Urine Macroscopic, POC    Collection Time: 07/20/23  4:34 PM   Result Value Ref Range    Color, UA Yellow     Clarity, UA Clear     pH, UA 5.0 4.5 - 8.0    Leukocytes, UA Negative Negative    Nitrite, UA Negative Negative    Protein, UA Negative Negative mg/dl    Glucose, UA Negative Negative mg/dl    Ketones, UA Negative Negative mg/dl    Urobilinogen, UA 0.2 0.2, 1.0 E.U./dl E.U./dl    Bilirubin, UA Negative Negative    Occult Blood, UA Trace (A) Negative    Specific Gravity, UA >=1.030 1.003 - 1.030   Urine Microscopic    Collection Time: 07/20/23  4:34 PM   Result Value Ref Range    RBC, UA 2-4 (A) None Seen, 1-2 /hpf    WBC, UA 1-2 None Seen, 1-2 /hpf    Epithelial Cells Occasional None Seen, Occasional /hpf    Bacteria, UA Occasional None Seen, Occasional /hpf    MUCUS THREADS Occasional (A) None Seen    Hyaline Casts, UA 0-3 (A) None Seen /lpf   CBC and differential    Collection Time: 07/20/23  4:47 PM   Result Value Ref Range    WBC 6.54 4.31 - 10.16 Thousand/uL    RBC 4.58 3.81 - 5.12 Million/uL    Hemoglobin 13.4 11.5 - 15.4 g/dL    Hematocrit 41.7 34.8 - 46.1 %    MCV 91 82 - 98 fL    MCH 29.3 26.8 - 34.3 pg    MCHC 32.1 31.4 - 37.4 g/dL    RDW 13.3 11.6 - 15.1 %    MPV 10.0 8.9 - 12.7 fL    Platelets 139 179 - 194 Thousands/uL    nRBC 0 /100 WBCs    Neutrophils Relative 67 43 - 75 %    Immat GRANS % 0 0 - 2 %    Lymphocytes Relative 22 14 - 44 %    Monocytes Relative 8 4 - 12 %    Eosinophils Relative 2 0 - 6 %    Basophils Relative 1 0 - 1 %    Neutrophils Absolute 4.40 1.85 - 7.62 Thousands/µL    Immature Grans Absolute 0.02 0.00 - 0.20 Thousand/uL    Lymphocytes Absolute 1.44 0.60 - 4.47 Thousands/µL    Monocytes Absolute 0.51 0.17 - 1.22 Thousand/µL    Eosinophils Absolute 0.12 0.00 - 0.61 Thousand/µL    Basophils Absolute 0.05 0.00 - 0.10 Thousands/µL   Basic metabolic panel    Collection Time: 07/20/23  4:47 PM   Result Value Ref Range    Sodium 137 135 - 147 mmol/L    Potassium 4.2 3.5 - 5.3 mmol/L    Chloride 105 96 - 108 mmol/L    CO2 26 21 - 32 mmol/L    ANION GAP 6 mmol/L    BUN 17 5 - 25 mg/dL    Creatinine 0.64 0.60 - 1.30 mg/dL    Glucose 84 65 - 140 mg/dL    Calcium 8.6 8.4 - 10.2 mg/dL    eGFR 105 ml/min/1.73sq m   Hepatic function panel    Collection Time: 07/20/23  4:47 PM   Result Value Ref Range    Total Bilirubin 0.63 0.20 - 1.00 mg/dL    Bilirubin, Direct 0.12 0.00 - 0.20 mg/dL    Alkaline Phosphatase 70 34 - 104 U/L    AST 14 13 - 39 U/L    ALT 10 7 - 52 U/L    Total Protein 6.4 6.4 - 8.4 g/dL    Albumin 4.0 3.5 - 5.0 g/dL   Magnesium    Collection Time: 07/20/23  4:47 PM   Result Value Ref Range    Magnesium 2.2 1.9 - 2.7 mg/dL   Lipase    Collection Time: 07/20/23  4:47 PM   Result Value Ref Range    Lipase 48 11 - 82 u/L            Physical Exam  Vitals and nursing note reviewed. Constitutional:       General: She is not in acute distress. Appearance: She is well-developed. She is not diaphoretic. HENT:      Head: Normocephalic and atraumatic. Eyes:      General:         Right eye: No discharge. Left eye: No discharge. Conjunctiva/sclera: Conjunctivae normal.      Pupils: Pupils are equal, round, and reactive to light. Neck:      Thyroid: No thyromegaly. Vascular: No JVD. Cardiovascular:      Rate and Rhythm: Normal rate and regular rhythm. Heart sounds: Normal heart sounds. No murmur heard. No friction rub. No gallop. Pulmonary:      Effort: Pulmonary effort is normal. No respiratory distress. Breath sounds: Normal breath sounds. No wheezing or rales. Chest:      Chest wall: No tenderness. Abdominal:      General: There is no distension. Palpations: Abdomen is soft. Tenderness: There is no abdominal tenderness. Musculoskeletal:         General: No tenderness or deformity. Normal range of motion. Cervical back: Normal range of motion and neck supple. Lymphadenopathy:      Cervical: No cervical adenopathy. Skin:     General: Skin is warm and dry. Coloration: Skin is not pale. Findings: No erythema or rash. Neurological:      Mental Status: She is alert and oriented to person, place, and time. Cranial Nerves: No cranial nerve deficit. Coordination: Coordination normal.   Psychiatric:         Behavior: Behavior normal.         Thought Content:  Thought content normal.         Judgment: Judgment normal.

## 2023-08-22 DIAGNOSIS — R10.31 RIGHT LOWER QUADRANT ABDOMINAL PAIN: ICD-10-CM

## 2023-08-22 RX ORDER — OMEPRAZOLE 20 MG/1
20 CAPSULE, DELAYED RELEASE ORAL DAILY
Qty: 90 CAPSULE | Refills: 1 | OUTPATIENT
Start: 2023-08-22

## 2023-11-02 ENCOUNTER — ANNUAL EXAM (OUTPATIENT)
Dept: GYNECOLOGY | Facility: CLINIC | Age: 48
End: 2023-11-02
Payer: COMMERCIAL

## 2023-11-02 VITALS
WEIGHT: 156 LBS | SYSTOLIC BLOOD PRESSURE: 100 MMHG | HEART RATE: 77 BPM | DIASTOLIC BLOOD PRESSURE: 70 MMHG | BODY MASS INDEX: 24.43 KG/M2

## 2023-11-02 DIAGNOSIS — Z12.31 ENCOUNTER FOR SCREENING MAMMOGRAM FOR MALIGNANT NEOPLASM OF BREAST: ICD-10-CM

## 2023-11-02 DIAGNOSIS — Z01.419 ENCOUNTER FOR GYNECOLOGICAL EXAMINATION WITHOUT ABNORMAL FINDING: Primary | ICD-10-CM

## 2023-11-02 PROCEDURE — S0612 ANNUAL GYNECOLOGICAL EXAMINA: HCPCS | Performed by: OBSTETRICS & GYNECOLOGY

## 2023-11-02 PROCEDURE — G0145 SCR C/V CYTO,THINLAYER,RESCR: HCPCS | Performed by: OBSTETRICS & GYNECOLOGY

## 2023-11-02 NOTE — PROGRESS NOTES
Assessment/Plan:       Diagnoses and all orders for this visit:    Encounter for gynecological examination without abnormal finding  -     Liquid-based pap, screening    Encounter for screening mammogram for malignant neoplasm of breast  -     Mammo screening bilateral w 3d & cad; Future      Subjective:      Patient ID: Anthony Hernandez is a 50 y.o. female. HPI patient presents for annual exam.  Status post [facility] BS in September 2019. This was secondary to adenomyosis ,endometriosis ,and leiomyomata uteri. Denies any vaginal irritation, burning, discharge or bleeding. Denies any dysuria, hematuria urgency or urinary incontinence. No GI complaints. Colonoscopy July 2022. Polyps identified. Advised to repeat in 7 years    The following portions of the patient's history were reviewed and updated as appropriate: She  has a past medical history of Anemia, Bulla of lung ([address]), Clotting disorder ([address]), Coccydynia, Family history of vitamin B12 deficiency, Fibroid, uterine, Headache(784.0), Hematuria, History of hypertension, History of morbid obesity, History of vitamin B deficiency, History of vitamin D deficiency, Hypocalcemia, Iron deficiency, Kyphosis deformity of spine, Menopause, Migraine, Narrow angle glaucoma suspect of both eyes, PONV (postoperative nausea and vomiting), Postgastrectomy malabsorption, Seasonal allergies, Vitamin A deficiency, and Wears glasses.   She   Patient Active Problem List    Diagnosis Date Noted    Right lower quadrant abdominal pain 07/31/2023    Enthesopathy of foot 05/01/2023    Annual physical exam 04/27/2021    Vitamin D deficiency 04/27/2021    Status post laparoscopic supracervical hysterectomy 09/16/2019    Exostosis of right foot 09/11/2019    Adenomyosis 08/28/2019    Bariatric surgery status 08/28/2018    Postsurgical malabsorption 08/28/2018    Bulla, lung ([address]) 06/23/2017    Anemia, iron deficiency 05/30/2017    Coccydynia 06/07/2016    Neoplasm of skin 02/25/2014 She  has a past surgical history that includes Marilee-en-y procedure ();  section (); Brain surgery (); Appendectomy; Endometrial ablation (2018); US guided breast biopsy left complete (Left, 10/12/2018); US guidance breast biopsy left each additional (Left, 10/12/2018); Cosmetic surgery; Refractive surgery; Tooth extraction; Tonsillectomy and adenoidectomy; Tubal ligation; pr laps supracrv hysterect 250 gm/< rmvl tube/ovar (N/A, 2019); Bunionectomy (Right, 10/02/2019); Breast biopsy (Left, 10/12/218); Augmentation mammaplasty (Bilateral, 2018); Hysterectomy (); Colonoscopy w/ polypectomy (2022); Bone exostosis excision (Right, 2023); Breast lumpectomy; and Bariatric Surgery (). Her family history includes Allergies in her father; Atrial fibrillation in her father and mother; Cancer in her maternal grandfather, mother, and paternal grandfather; Colon polyps in her father and mother; Diabetes in her father; Diverticulitis in her father; [place] G[place]' disease in her mother; Heart disease in her father; Hypertension in her father and mother; Lung cancer in her maternal grandfather, mother, and paternal grandfather; Melanoma in her mother; No Known Problems in her daughter, maternal grandmother, paternal aunt, paternal aunt, paternal aunt, and paternal aunt; Obesity in her father; Other in her maternal aunt; Stomach cancer in her maternal aunt and paternal aunt; Stroke in her paternal grandmother; Substance Abuse in her brother; Thyroid disease in her mother. She  reports that she has never smoked. She has never used smokeless tobacco. She reports current alcohol use of about 1.0 standard drink of alcohol per week. She reports that she does not use drugs.   Current Outpatient Medications   Medication Sig Dispense Refill    ergocalciferol (VITAMIN D2) 50,000 units Take 1 capsule (50,000 Units total) by mouth every 14 (fourteen) days 12 capsule 0    omeprazole (PriLOSEC) 20 mg delayed release capsule Take 1 capsule (20 mg total) by mouth daily (Patient not taking: Reported on 11/2/2023) 30 capsule 0    simethicone (MYLICON,GAS-X) 594 MG CAPS Take 1 capsule (125 mg total) by mouth every 6 (six) hours as needed for flatulence (bloating) (Patient not taking: Reported on 11/2/2023) 28 capsule 0     No current facility-administered medications for this visit. Current Outpatient Medications on File Prior to Visit   Medication Sig    ergocalciferol (VITAMIN D2) 50,000 units Take 1 capsule (50,000 Units total) by mouth every 14 (fourteen) days    omeprazole (PriLOSEC) 20 mg delayed release capsule Take 1 capsule (20 mg total) by mouth daily (Patient not taking: Reported on 11/2/2023)    simethicone (MYLICON,GAS-X) 433 MG CAPS Take 1 capsule (125 mg total) by mouth every 6 (six) hours as needed for flatulence (bloating) (Patient not taking: Reported on 11/2/2023)     No current facility-administered medications on file prior to visit. She is allergic to pollen extract and suture. .    Review of Systems   Constitutional: Negative. HENT:  Negative for sore throat and trouble swallowing. Gastrointestinal: Negative. Genitourinary: Negative. Objective:      /70   Pulse 77   Wt 70.8 kg (156 lb)   LMP 09/09/2019   BMI 24.43 kg/m²          Physical Exam  Vitals reviewed. Constitutional:       Appearance: Normal appearance. Cardiovascular:      Rate and Rhythm: Normal rate and regular rhythm. Pulses: Normal pulses. Heart sounds: Normal heart sounds. Pulmonary:      Effort: Pulmonary effort is normal. No respiratory distress. Breath sounds: Normal breath sounds. Chest:   Breasts:     Right: No swelling, bleeding, inverted nipple, mass, nipple discharge, skin change or tenderness. Left: No swelling, bleeding, inverted nipple, mass, nipple discharge, skin change or tenderness. Abdominal:      General: There is no distension. Palpations: Abdomen is soft. There is no mass. Tenderness: There is no abdominal tenderness. There is no guarding or rebound. Hernia: No hernia is present. There is no hernia in the left inguinal area or right inguinal area. Genitourinary:     General: Normal vulva. Labia:         Right: No rash, tenderness or lesion. Left: No rash, tenderness or lesion. Vagina: Normal.      Cervix: Normal.      Uterus: Absent. Adnexa:         Right: No mass, tenderness or fullness. Left: No mass, tenderness or fullness. Musculoskeletal:      Cervical back: Normal range of motion and neck supple. No tenderness. Lymphadenopathy:      Cervical: No cervical adenopathy. Upper Body:      Right upper body: No supraclavicular, axillary or pectoral adenopathy. Left upper body: No supraclavicular, axillary or pectoral adenopathy. Lower Body: No right inguinal adenopathy. No left inguinal adenopathy. Neurological:      Mental Status: She is alert.

## 2023-11-09 LAB
LAB AP GYN PRIMARY INTERPRETATION: NORMAL
Lab: NORMAL
PATH INTERP SPEC-IMP: NORMAL

## 2023-12-17 ENCOUNTER — APPOINTMENT (OUTPATIENT)
Dept: LAB | Age: 48
End: 2023-12-17
Payer: COMMERCIAL

## 2023-12-17 DIAGNOSIS — Z01.818 OTHER SPECIFIED PRE-OPERATIVE EXAMINATION: ICD-10-CM

## 2023-12-17 DIAGNOSIS — E55.9 VITAMIN D DEFICIENCY: ICD-10-CM

## 2023-12-17 DIAGNOSIS — D50.8 OTHER IRON DEFICIENCY ANEMIA: ICD-10-CM

## 2023-12-17 DIAGNOSIS — Z98.84 BARIATRIC SURGERY STATUS: ICD-10-CM

## 2023-12-17 LAB
25(OH)D3 SERPL-MCNC: 24.7 NG/ML (ref 30–100)
ANION GAP SERPL CALCULATED.3IONS-SCNC: 5 MMOL/L
BUN SERPL-MCNC: 18 MG/DL (ref 5–25)
CALCIUM SERPL-MCNC: 8.8 MG/DL (ref 8.4–10.2)
CHLORIDE SERPL-SCNC: 106 MMOL/L (ref 96–108)
CO2 SERPL-SCNC: 28 MMOL/L (ref 21–32)
CREAT SERPL-MCNC: 0.7 MG/DL (ref 0.6–1.3)
ERYTHROCYTE [DISTWIDTH] IN BLOOD BY AUTOMATED COUNT: 13.4 % (ref 11.6–15.1)
FERRITIN SERPL-MCNC: 6 NG/ML (ref 11–307)
GFR SERPL CREATININE-BSD FRML MDRD: 102 ML/MIN/1.73SQ M
GLUCOSE P FAST SERPL-MCNC: 82 MG/DL (ref 65–99)
HCT VFR BLD AUTO: 42.4 % (ref 34.8–46.1)
HGB BLD-MCNC: 13.9 G/DL (ref 11.5–15.4)
IRON SATN MFR SERPL: 9 % (ref 15–50)
IRON SERPL-MCNC: 39 UG/DL (ref 50–212)
MCH RBC QN AUTO: 29.5 PG (ref 26.8–34.3)
MCHC RBC AUTO-ENTMCNC: 32.8 G/DL (ref 31.4–37.4)
MCV RBC AUTO: 90 FL (ref 82–98)
PLATELET # BLD AUTO: 255 THOUSANDS/UL (ref 149–390)
PMV BLD AUTO: 10.1 FL (ref 8.9–12.7)
POTASSIUM SERPL-SCNC: 4.1 MMOL/L (ref 3.5–5.3)
RBC # BLD AUTO: 4.71 MILLION/UL (ref 3.81–5.12)
SODIUM SERPL-SCNC: 139 MMOL/L (ref 135–147)
TIBC SERPL-MCNC: 418 UG/DL (ref 250–450)
UIBC SERPL-MCNC: 379 UG/DL (ref 155–355)
WBC # BLD AUTO: 5.64 THOUSAND/UL (ref 4.31–10.16)

## 2023-12-17 PROCEDURE — 36415 COLL VENOUS BLD VENIPUNCTURE: CPT

## 2023-12-17 PROCEDURE — 83550 IRON BINDING TEST: CPT

## 2023-12-17 PROCEDURE — 82306 VITAMIN D 25 HYDROXY: CPT

## 2023-12-17 PROCEDURE — 83540 ASSAY OF IRON: CPT

## 2023-12-17 PROCEDURE — 80048 BASIC METABOLIC PNL TOTAL CA: CPT

## 2023-12-17 PROCEDURE — 82728 ASSAY OF FERRITIN: CPT

## 2023-12-17 PROCEDURE — 85027 COMPLETE CBC AUTOMATED: CPT

## 2023-12-20 DIAGNOSIS — E55.9 VITAMIN D DEFICIENCY: ICD-10-CM

## 2023-12-20 RX ORDER — ERGOCALCIFEROL 1.25 MG/1
50000 CAPSULE ORAL WEEKLY
Qty: 12 CAPSULE | Refills: 1 | Status: SHIPPED | OUTPATIENT
Start: 2023-12-20

## 2023-12-20 RX ORDER — ERGOCALCIFEROL 1.25 MG/1
50000 CAPSULE ORAL
Qty: 12 CAPSULE | Refills: 1 | Status: SHIPPED | OUTPATIENT
Start: 2023-12-20 | End: 2023-12-20

## 2024-01-03 ENCOUNTER — CONSULT (OUTPATIENT)
Dept: PULMONOLOGY | Facility: CLINIC | Age: 49
End: 2024-01-03
Payer: COMMERCIAL

## 2024-01-03 VITALS
HEIGHT: 67 IN | WEIGHT: 161.8 LBS | TEMPERATURE: 98.9 F | OXYGEN SATURATION: 97 % | DIASTOLIC BLOOD PRESSURE: 82 MMHG | BODY MASS INDEX: 25.39 KG/M2 | HEART RATE: 76 BPM | SYSTOLIC BLOOD PRESSURE: 110 MMHG

## 2024-01-03 DIAGNOSIS — J43.9 BULLA, LUNG (HCC): ICD-10-CM

## 2024-01-03 PROCEDURE — 99244 OFF/OP CNSLTJ NEW/EST MOD 40: CPT | Performed by: INTERNAL MEDICINE

## 2024-01-03 NOTE — PROGRESS NOTES
Consultation - Pulmonary Medicine   Valerie Gay 48 y.o. 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  No need to continue to monitor this as it is stable over years.  I just paid her attention to do things:  1-although very unlikely given her bulla is not subpleural, but if she develops sharp try to chest pain with shortness of breath then to consider spontaneous pneumothorax.  Regardless she is aware of this bullous disease and she will notify care providers in the future so not seen on simple chest x-ray but not to miss interpret it  as pneumothorax.  2-I do not believe her bullous disease puts her at risk of having more infection and she denies any history of recurrent infections but in case she has pneumonia and prolonged symptoms or recurrence of infections regardless of appropriate antibiotic treatment then to consider bullous infection that will require prolonged course of antibiotics.  She will call in the future if she has any changes in her symptoms.      Return if symptoms worsen or fail to improve.    Diagnoses and all orders for this visit:    Bulla, lung (HCC)  -     Ambulatory Referral to Pulmonology      ______________________________________________________________________    HPI:    Valerie Gay is a 48 y.o. female who presents for evaluation of abnormal finding on CT scan..    Patient has history of gastric bypass surgery many years ago, she developed abdominal pain recently and during workup she had abdominal CT scan that showed an abnormality on the base of the lung, so she was referred for  follow-up.    I know the patient from 7 years ago, in 2017 she came for the same issue with large bulla/cyst seen on CT scan incidentally as well.  At that time patient was asymptomatic and I explained to her that this is most likely congenital/developmental bullous cystic disease in the right lung.  She continues to be asymptomatic from pulmonary standpoint, she denies dyspnea on exertion or shortness of breath, denies cough or sputum production, denies wheezing or chest tightness, denies chest pain, denies fever chills or night sweats.  She denies weight loss.  Denies recurrent pneumonias or infections.  Patient has history of AVMs as a child and treated twice surgically.  No residual neuro deficits.  Patient had gastric bypass surgery in 2013 and has been doing fine, she lost a lot of weight and has been stable recently.  She underwent tummy tuck surgery last year and currently she is planned to have some surgical intervention for excessive skin.  She lives at home with family, no occupational exposure, never smoked cigarettes.      Review of Systems:  Review of Systems  Aside from what is mentioned in the HPI, the review of systems otherwise negative.    Current Medications:    Current Outpatient Medications:     ergocalciferol (VITAMIN D2) 50,000 units, Take 1 capsule (50,000 Units total) by mouth once a week, Disp: 12 capsule, Rfl: 1    omeprazole (PriLOSEC) 20 mg delayed release capsule, Take 1 capsule (20 mg total) by mouth daily (Patient not taking: Reported on 11/2/2023), Disp: 30 capsule, Rfl: 0    simethicone (MYLICON,GAS-X) 125 MG CAPS, Take 1 capsule (125 mg total) by mouth every 6 (six) hours as needed for flatulence (bloating) (Patient not taking: Reported on 11/2/2023), Disp: 28 capsule, Rfl: 0    Historical Information   Past Medical History:   Diagnosis Date    Anemia     iron def    Bulla of lung (HCC)     Clotting disorder (HCC)     AVM    Coccydynia     Family history of vitamin B12 deficiency      Fibroid, uterine     Headache(784.0)     Hematuria     last assessed: 2013    History of hypertension     History of morbid obesity     History of vitamin B deficiency     History of vitamin D deficiency     Hypocalcemia     Iron deficiency     Kyphosis deformity of spine     Menopause     Migraine     Narrow angle glaucoma suspect of both eyes     PONV (postoperative nausea and vomiting)     nausea only    Postgastrectomy malabsorption     Seasonal allergies     Vitamin A deficiency     last assessed: 2014    Wears glasses      Past Surgical History:   Procedure Laterality Date    APPENDECTOMY      AUGMENTATION MAMMAPLASTY Bilateral 2018    BARIATRIC SURGERY  2014    BONE EXOSTOSIS EXCISION Right 2023    Procedure: EXOSTECTOMY OF FOOT;  Surgeon: Linden Cowart DPM;  Location:  MAIN OR;  Service: Podiatry    BRAIN SURGERY      AVM    BREAST BIOPSY Left 10/12/218    x2    BREAST LUMPECTOMY      BUNIONECTOMY Right 10/02/2019    Procedure: EXOSTECTOMY RIGHT FOOT;  Surgeon: Linden Cowart DPM;  Location:  MAIN OR;  Service: Podiatry     SECTION      COLONOSCOPY W/ POLYPECTOMY  2022    5 YEAR RECOMMENDED F/U    COSMETIC SURGERY      tummy tuck,breast implants and lift    ENDOMETRIAL ABLATION  2018    HYSTERECTOMY  2019    MN LAPS SUPRACRV HYSTERECT 250 GM/< RMVL TUBE/OVAR N/A 2019    Procedure: HYSTERECTOMY LAPAROSCOPIC SUPRACERVICAL (LSH)  WITH B/L SALPINGECTOMY. EXCISION OF ENDOMETRIAL CYST;  Surgeon: Leo Bustillo DO;  Location: [facility];  Service: Gynecology    REFRACTIVE SURGERY      glaucoma    HITESH-EN-Y PROCEDURE      TONSILLECTOMY AND ADENOIDECTOMY      including adenoids    TOOTH EXTRACTION      TUBAL LIGATION      US GUIDANCE BREAST BIOPSY LEFT EACH ADDITIONAL Left 10/12/2018    US GUIDED BREAST BIOPSY LEFT COMPLETE Left 10/12/2018     Social History   Social History     Tobacco Use   Smoking Status Never   Smokeless Tobacco Never  "      Occupational history:  No occupational exposure    Family History:   Family History   Problem Relation Age of Onset    Hypertension Mother     Atrial fibrillation Mother     Melanoma Mother     Cancer Mother         Melanoma    Graves' disease Mother     Lung cancer Mother     Colon polyps Mother     Thyroid disease Mother     Hypertension Father     Atrial fibrillation Father     Diabetes Father         Type 2    Obesity Father     Allergies Father     Heart disease Father     Colon polyps Father     Diverticulitis Father     No Known Problems Daughter     No Known Problems Maternal Grandmother     Cancer Maternal Grandfather         Lung    Lung cancer Maternal Grandfather     Stroke Paternal Grandmother     Cancer Paternal Grandfather         Lung    Lung cancer Paternal Grandfather     Substance Abuse Brother     Stomach cancer Maternal Aunt     Other Maternal Aunt         GIST, malignant-per [employer]    Stomach cancer Paternal Aunt     No Known Problems Paternal Aunt     No Known Problems Paternal Aunt     No Known Problems Paternal Aunt     No Known Problems Paternal Aunt     Depression Neg Hx     Anxiety disorder Neg Hx     Drug abuse Neg Hx     Breast cancer Neg Hx          PhysicalExamination:  Vitals:   /82 (BP Location: Left arm, Patient Position: Sitting, Cuff Size: Large)   Pulse 76   Temp 98.9 °F (37.2 °C) (Tympanic)   Ht 5' 7\" (1.702 m)   Wt 73.4 kg (161 lb 12.8 oz)   LMP 09/09/2019   SpO2 97%   BMI 25.34 kg/m²     Gen:  Comfortable on room air.  No conversational dyspnea  HEENT:  Conjugate gaze.  sclerae anicteric.  Oropharynx moist  Neck: Trachea is midline. No JVD. No adenopathy  Chest: Clear to auscultation bilaterally with no crackles or wheezing but slightly decreased breath sounds at the right base  Cardiac: S1-S2 regular. no murmur  Abdomen:  benign, no tenderness and soft  Extremities: No edema, no clubbing or cyanosis  Neuro:  Normal speech and mentation      Diagnostic " "Data:  Labs:  I personally reviewed the most recent laboratory data pertinent to today's visit    Lab Results   Component Value Date    WBC 5.64 12/17/2023    HGB 13.9 12/17/2023    HCT 42.4 12/17/2023    MCV 90 12/17/2023     12/17/2023     Lab Results   Component Value Date    GLUCOSE 88 09/10/2014    CALCIUM 8.8 12/17/2023     09/10/2014    K 4.1 12/17/2023    CO2 28 12/17/2023     12/17/2023    BUN 18 12/17/2023    CREATININE 0.70 12/17/2023     No results found for: \"IGE\"  Lab Results   Component Value Date    ALT 10 07/20/2023    AST 14 07/20/2023    ALKPHOS 70 07/20/2023    BILITOT 1.1 (H) 09/10/2014         Imaging:  I personally reviewed the images on the PAC system pertinent to today's visit  Abdominal CT scan reviewed on PACS with the patient in the room and compared to chest CT scan from 2017: Persistent bullous/cystic disease in the right face/right lower lobe measuring about 11 x 11 cm with some septae and side indicating conglomerate of bullous disease, no change from 6-7 years ago, there is a tiny dependent air-fluid level, was seen on prior CT scans.          Evan Hayes MD  "

## 2024-01-04 NOTE — ASSESSMENT & PLAN NOTE
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  No need to continue to monitor this as it is stable over years.  I just paid her attention to do things:  1-although very unlikely given her bulla is not subpleural, but if she develops sharp try to chest pain with shortness of breath then to consider spontaneous pneumothorax.  Regardless she is aware of this bullous disease and she will notify care providers in the future so not seen on simple chest x-ray but not to miss interpret it  as pneumothorax.  2-I do not believe her bullous disease puts her at risk of having more infection and she denies any history of recurrent infections but in case she has pneumonia and prolonged symptoms or recurrence of infections regardless of appropriate antibiotic treatment then to consider bullous infection that will require prolonged course of antibiotics.  She will call in the future if she has any changes in her symptoms.

## 2024-03-04 ENCOUNTER — HOSPITAL ENCOUNTER (OUTPATIENT)
Dept: MAMMOGRAPHY | Facility: MEDICAL CENTER | Age: 49
Discharge: HOME | End: 2024-03-04
Payer: COMMERCIAL

## 2024-03-04 VITALS — WEIGHT: 161.82 LBS | HEIGHT: 67 IN | BODY MASS INDEX: 25.4 KG/M2

## 2024-03-04 DIAGNOSIS — Z12.31 ENCOUNTER FOR SCREENING MAMMOGRAM FOR MALIGNANT NEOPLASM OF BREAST: ICD-10-CM

## 2024-03-04 PROCEDURE — 77063 BREAST TOMOSYNTHESIS BI: CPT

## 2024-03-04 PROCEDURE — 77067 SCR MAMMO BI INCL CAD: CPT

## 2024-03-18 ENCOUNTER — OFFICE VISIT (OUTPATIENT)
Dept: URGENT CARE | Facility: CLINIC | Age: 49
End: 2024-03-18
Payer: COMMERCIAL

## 2024-03-18 VITALS
OXYGEN SATURATION: 98 % | HEART RATE: 81 BPM | SYSTOLIC BLOOD PRESSURE: 104 MMHG | RESPIRATION RATE: 18 BRPM | TEMPERATURE: 98.3 F | DIASTOLIC BLOOD PRESSURE: 64 MMHG

## 2024-03-18 DIAGNOSIS — H00.021 HORDEOLUM INTERNUM OF RIGHT UPPER EYELID: Primary | ICD-10-CM

## 2024-03-18 PROCEDURE — 99213 OFFICE O/P EST LOW 20 MIN: CPT | Performed by: NURSE PRACTITIONER

## 2024-03-18 RX ORDER — ERYTHROMYCIN 5 MG/G
0.5 OINTMENT OPHTHALMIC EVERY 12 HOURS SCHEDULED
Qty: 3.5 G | Refills: 0 | Status: SHIPPED | OUTPATIENT
Start: 2024-03-18

## 2024-03-18 RX ORDER — CEPHALEXIN 500 MG/1
500 CAPSULE ORAL 3 TIMES DAILY
COMMUNITY
Start: 2024-02-12

## 2024-03-18 NOTE — PROGRESS NOTES
[facility]        NAME: Valerie Gay is a 48 y.o. female  : 1975    MRN: [id number]  DATE: 2024  TIME: 5:14 PM    Assessment and Plan   Hordeolum internum of right upper eyelid [H00.021]  1. Hordeolum internum of right upper eyelid  erythromycin (ILOTYCIN) ophthalmic ointment        Recommend continue warm compresses  .  Will start erythromycin eye ointment.  Instructions provided.  Follow-up PCP.  Patient agreement plan.  Patient Instructions     Follow up with PCP in 3-5 days.  Proceed to  ER if symptoms worsen.    Chief Complaint     Chief Complaint   Patient presents with    Eye Problem     Patient with pain and swelling to the right top eyelid since Saturday         History of Present Illness   Valerie Gay presents to the clinic c/o    Eye Problem (Patient with pain and swelling to the right top eyelid since Saturday)    Thinks it may be a stye.  She has been straining her eyes more recently that she needs to wear different glasses at work as opposed to at home.  Does not wear contacts.        Review of Systems   Review of Systems   All other systems reviewed and are negative.        Current Medications     Long-Term Medications   Medication Sig Dispense Refill    ergocalciferol (VITAMIN D2) 50,000 units Take 1 capsule (50,000 Units total) by mouth once a week 12 capsule 1    omeprazole (PriLOSEC) 20 mg delayed release capsule Take 1 capsule (20 mg total) by mouth daily (Patient not taking: Reported on 2023) 30 capsule 0       Current Allergies     Allergies as of 2024 - Reviewed 2024   Allergen Reaction Noted    Pollen extract Allergic Rhinitis 2019    Suture Other (See Comments) 2020            The following portions of the patient's history were reviewed and updated as appropriate: allergies, current medications, past family history, past medical history, past social history, past surgical history and problem list.    Objective   /64    Pulse 81   Temp 98.3 °F (36.8 °C) (Tympanic)   Resp 18   LMP 09/09/2019   SpO2 98%        Physical Exam     Physical Exam  Vitals and nursing note reviewed.   Constitutional:       Appearance: Normal appearance. She is well-developed.   HENT:      Head: Normocephalic and atraumatic.      Right Ear: Hearing, tympanic membrane, ear canal and external ear normal.      Left Ear: Hearing, tympanic membrane, ear canal and external ear normal.      Nose: Nose normal.      Mouth/Throat:      Lips: Pink.      Mouth: Mucous membranes are moist.      Pharynx: Oropharynx is clear.   Eyes:      General: Lids are normal.         Right eye: Hordeolum present.      Conjunctiva/sclera: Conjunctivae normal.      Pupils: Pupils are equal, round, and reactive to light.     Cardiovascular:      Rate and Rhythm: Normal rate and regular rhythm.      Heart sounds: Normal heart sounds, S1 normal and S2 normal.   Pulmonary:      Effort: Pulmonary effort is normal.      Breath sounds: Normal breath sounds.   Abdominal:      General: Abdomen is flat. Bowel sounds are normal.      Palpations: Abdomen is soft.   Musculoskeletal:         General: Normal range of motion.      Cervical back: Full passive range of motion without pain, normal range of motion and neck supple.   Skin:     General: Skin is warm and dry.   Neurological:      General: No focal deficit present.      Mental Status: She is alert and oriented to person, place, and time.   Psychiatric:         Mood and Affect: Mood normal.         Speech: Speech normal.         Behavior: Behavior normal. Behavior is cooperative.         Thought Content: Thought content normal.         Judgment: Judgment normal.

## 2024-03-18 NOTE — PATIENT INSTRUCTIONS
[facility]   AMBULATORY CARE:   A stye  is a lump on the edge or inside of your upper or lower eyelid caused by an infection. A stye usually starts to get better within 1 week and is often gone within 2 weeks.       Common signs and symptoms of a stye:   Warmth, redness, and swelling along your eyelid    A painful, pus-filled lump on your eyelid    A gritty feeling in your eye    Tearing more than usual    Call your doctor if:   You have redness and discharge around your eye, and your eye pain is getting worse.    Your vision changes.    The stye has not gone away within 7 days.    The stye comes back within a short period of time after treatment.    You have questions or concerns about your condition or care.    Treatment:  Antibiotics may be needed to treat the stye. This medicine is given as an ointment to put into your eye.  Manage a stye:   Use warm compresses, as directed.  This will help decrease swelling and pain. Wet a clean washcloth with warm water and place it on your eye for 10 to 15 minutes, 3 to 4 times each day, or as directed.    Keep your hands away from your eye.  This helps to prevent the spread of infection to other parts of the eye. Wash your hands often with soap and dry with a clean towel. Do not squeeze the stye.    Do not wear eye makeup while you have a stye.  Eye makeup may carry bacteria and cause another stye. Throw away eye makeup and brushes used to apply the makeup. Use new eye makeup after the stye has gone away. Do not share eye makeup with others.    Prevent another stye:  Wash your face and clean your eyelashes every day. Remove eye makeup with makeup remover. This helps to remove eye makeup without heavy rubbing.  Follow up with your doctor as directed:  Write down your questions so you remember to ask them during your visits.  © Copyright [employer] 2023 Information is for End User's use only and may not be sold, redistributed or otherwise used for commercial purposes.  The above  information is an  only. It is not intended as medical advice for individual conditions or treatments. Talk to your doctor, nurse or pharmacist before following any medical regimen to see if it is safe and effective for you.

## 2024-04-16 ENCOUNTER — OFFICE VISIT (OUTPATIENT)
Dept: INTERNAL MEDICINE CLINIC | Facility: OTHER | Age: 49
End: 2024-04-16
Payer: COMMERCIAL

## 2024-04-16 VITALS
BODY MASS INDEX: 25.27 KG/M2 | HEART RATE: 77 BPM | HEIGHT: 67 IN | DIASTOLIC BLOOD PRESSURE: 64 MMHG | OXYGEN SATURATION: 98 % | SYSTOLIC BLOOD PRESSURE: 100 MMHG | WEIGHT: 161 LBS | TEMPERATURE: 98.4 F

## 2024-04-16 DIAGNOSIS — J43.9 BULLA, LUNG (HCC): ICD-10-CM

## 2024-04-16 DIAGNOSIS — J06.9 ACUTE URI: Primary | ICD-10-CM

## 2024-04-16 PROCEDURE — 99213 OFFICE O/P EST LOW 20 MIN: CPT

## 2024-04-16 RX ORDER — BENZONATATE 100 MG/1
100 CAPSULE ORAL 3 TIMES DAILY PRN
Qty: 20 CAPSULE | Refills: 0 | Status: SHIPPED | OUTPATIENT
Start: 2024-04-16

## 2024-04-16 NOTE — ASSESSMENT & PLAN NOTE
URI symptoms x 2 days  Symptoms likely viral in nature  Advised Flonase twice daily, second-generation antihistamine such as Zyrtec daily, decongestant daily  Symptomatic care including salt water gargles for sore throat, steam showers for congestion, warm liquids   Discussed red flag symptoms including going to the ER with chest pain or shortness of breath  Discussed with Dr. Rowe.  At this time, will defer antibiotic therapy, however, advised patient if she has any worsening in symptoms, or if symptoms are present for approximately 1 week, to call the office for antibiotic therapy

## 2024-04-16 NOTE — PROGRESS NOTES
" Assessment/Plan:    1. Acute URI  Assessment & Plan:  URI symptoms x 2 days  Symptoms likely viral in nature  Advised Flonase twice daily, second-generation antihistamine such as Zyrtec daily, decongestant daily  Symptomatic care including salt water gargles for sore throat, steam showers for congestion, warm liquids   Discussed red flag symptoms including going to the ER with chest pain or shortness of breath  Discussed with Dr. Rowe.  At this time, will defer antibiotic therapy, however, advised patient if she has any worsening in symptoms, or if symptoms are present for approximately 1 week, to call the office for antibiotic therapy    Orders:  -     benzonatate (TESSALON PERLES) 100 mg capsule; Take 1 capsule (100 mg total) by mouth 3 (three) times a day as needed for cough    2. Bulla, lung (HCC)           Depression Screening and Follow-up Plan: Patient was screened for depression during today's encounter. They screened negative with a PHQ-2 score of 0.        [employer] software was used to dictate this note.  It may contain errors with dictating incorrect words or incorrect spelling. Please contact the provider directly with any questions.    Subjective:      Patient ID: Valerie Gay is a 48 y.o. female.    Chief Complaint   Patient presents with    Cold Like Symptoms     Patient complains of upper respiratory like symptoms, nasal congestion, post nasal drip, coughing productive with green mucous. On going started this past Sunday 4/14/2024    Health maint.     Colorectal Cancer Screening due: 7/25/2027  Mammogram due: 3/5/2025  Depression Screening- due  Physical- due       Patient is a 48 year old female presenting to the office for URI symptoms x 2 days    Per pulmonologist: \"I do not believe her bullous disease puts her at risk of having more infection and she denies any history of recurrent infections but in case she has pneumonia and prolonged symptoms or recurrence of infections regardless of " "appropriate antibiotic treatment then to consider bullous infection that will require prolonged course of antibiotics\"    Tx: sara YOON   This is a new problem. The current episode started in the past 7 days. The problem has been unchanged. There has been no fever. Associated symptoms include congestion, coughing, a plugged ear sensation, rhinorrhea and a sore throat. Pertinent negatives include no abdominal pain, chest pain, diarrhea, ear pain, headaches, nausea, sinus pain, vomiting or wheezing. She has tried decongestant for the symptoms. The treatment provided mild relief.       The following portions of the patient's history were reviewed and updated as appropriate: allergies, current medications, past family history, past medical history, past social history, past surgical history, and problem list.    Review of Systems   Constitutional:  Negative for chills and fever.   HENT:  Positive for congestion, postnasal drip, rhinorrhea, sore throat and voice change (yesteray hoarse voice). Negative for ear pain, sinus pressure, sinus pain and trouble swallowing.    Eyes:  Negative for discharge and redness.   Respiratory:  Positive for cough. Negative for shortness of breath and wheezing.    Cardiovascular:  Negative for chest pain and palpitations.   Gastrointestinal:  Negative for abdominal pain, diarrhea, nausea and vomiting.   Neurological:  Negative for dizziness, light-headedness and headaches.         Past Medical History:   Diagnosis Date    Anemia     iron def    Bulla of lung (HCC)     Clotting disorder (HCC)     AVM    Coccydynia     Family history of vitamin B12 deficiency     Fibroid, uterine     Headache(784.0)     Hematuria     last assessed: 12/11/2013    History of hypertension     History of morbid obesity     History of vitamin B deficiency     History of vitamin D deficiency     Hypocalcemia     Iron deficiency     Kyphosis deformity of spine     Menopause     Migraine     Narrow angle " glaucoma suspect of both eyes     PONV (postoperative nausea and vomiting)     nausea only    Postgastrectomy malabsorption     Seasonal allergies     Vitamin A deficiency     last assessed: 2014    Wears glasses          Current Outpatient Medications:     benzonatate (TESSALON PERLES) 100 mg capsule, Take 1 capsule (100 mg total) by mouth 3 (three) times a day as needed for cough, Disp: 20 capsule, Rfl: 0    ergocalciferol (VITAMIN D2) 50,000 units, Take 1 capsule (50,000 Units total) by mouth once a week, Disp: 12 capsule, Rfl: 1    cephalexin (KEFLEX) 500 mg capsule, Take 500 mg by mouth 3 (three) times a day (Patient not taking: Reported on 2024), Disp: , Rfl:     erythromycin (ILOTYCIN) ophthalmic ointment, Administer 0.5 inches to the right eye every 12 (twelve) hours (Patient not taking: Reported on 2024), Disp: 3.5 g, Rfl: 0    Allergies   Allergen Reactions    Pollen Extract Allergic Rhinitis    Suture Other (See Comments)     Red and break open       Social History   Past Surgical History:   Procedure Laterality Date    APPENDECTOMY      AUGMENTATION MAMMAPLASTY Bilateral 2018    BARIATRIC SURGERY      BONE EXOSTOSIS EXCISION Right 2023    Procedure: EXOSTECTOMY OF FOOT;  Surgeon: Linden Cowart DPM;  Location:  MAIN OR;  Service: Podiatry    BRAIN SURGERY      AVM    BREAST BIOPSY Left 10/12/218    x2    BREAST LUMPECTOMY      BUNIONECTOMY Right 10/02/2019    Procedure: EXOSTECTOMY RIGHT FOOT;  Surgeon: Linden Cowart DPM;  Location:  MAIN OR;  Service: Podiatry     SECTION      COLONOSCOPY W/ POLYPECTOMY  2022    5 YEAR RECOMMENDED F/U    COSMETIC SURGERY      tummy tuck,breast implants and lift    COSMETIC SURGERY      01/10/2024 skin resection, thigh buttocks and back    ENDOMETRIAL ABLATION  2018    HYSTERECTOMY  2019    DE LAPS SUPRACRV HYSTERECT 250 GM/< RMVL TUBE/OVAR N/A 2019    Procedure: HYSTERECTOMY LAPAROSCOPIC SUPRACERVICAL  "([facility])  WITH B/L SALPINGECTOMY. EXCISION OF ENDOMETRIAL CYST;  Surgeon: Leo Bustillo DO;  Location: [facility];  Service: Gynecology    REFRACTIVE SURGERY      glaucoma    HITESH-EN-Y PROCEDURE  2014    TONSILLECTOMY AND ADENOIDECTOMY      including adenoids    TOOTH EXTRACTION      TUBAL LIGATION      US GUIDANCE BREAST BIOPSY LEFT EACH ADDITIONAL Left 10/12/2018    US GUIDED BREAST BIOPSY LEFT COMPLETE Left 10/12/2018     Family History   Problem Relation Age of Onset    Hypertension Mother     Atrial fibrillation Mother     Melanoma Mother     Cancer Mother         Melanoma    Graves' disease Mother     Lung cancer Mother     Colon polyps Mother     Thyroid disease Mother     Hypertension Father     Atrial fibrillation Father     Diabetes Father         Type 2    Obesity Father     Allergies Father     Heart disease Father     Colon polyps Father     Diverticulitis Father     No Known Problems Daughter     No Known Problems Maternal Grandmother     Lung cancer Maternal Grandfather     Cancer Maternal Grandfather         Lung    Stroke Paternal Grandmother     Lung cancer Paternal Grandfather     Cancer Paternal Grandfather         Lung    Substance Abuse Brother     Stomach cancer Maternal Aunt     Other Maternal Aunt         GIST, malignant-per [employer]    Stomach cancer Paternal Aunt     No Known Problems Paternal Aunt     No Known Problems Paternal Aunt     No Known Problems Paternal Aunt     No Known Problems Paternal Aunt     Depression Neg Hx     Anxiety disorder Neg Hx     Drug abuse Neg Hx     Breast cancer Neg Hx        Objective:  /64 (BP Location: Left arm, Patient Position: Sitting, Cuff Size: Adult)   Pulse 77   Temp 98.4 °F (36.9 °C) (Temporal)   Ht 5' 7\" (1.702 m)   Wt 73 kg (161 lb)   LMP 09/09/2019   SpO2 98%   BMI 25.22 kg/m²      Physical Exam  Vitals and nursing note reviewed.   Constitutional:       General: She is not in acute distress.     Appearance: Normal appearance. " She is not ill-appearing or diaphoretic.   HENT:      Head: Normocephalic and atraumatic.      Right Ear: Ear canal and external ear normal. No tenderness. A middle ear effusion is present.      Left Ear: Ear canal and external ear normal. No tenderness. A middle ear effusion is present.      Nose: Congestion present.      Mouth/Throat:      Mouth: Mucous membranes are moist.      Pharynx: Oropharynx is clear. Uvula midline. No pharyngeal swelling, oropharyngeal exudate or posterior oropharyngeal erythema.      Tonsils: No tonsillar exudate.   Eyes:      General:         Right eye: No discharge.         Left eye: No discharge.      Extraocular Movements: Extraocular movements intact.      Conjunctiva/sclera: Conjunctivae normal.      Pupils: Pupils are equal, round, and reactive to light.   Cardiovascular:      Rate and Rhythm: Normal rate and regular rhythm.      Pulses: Normal pulses.      Heart sounds: Normal heart sounds. No murmur heard.     No friction rub. No gallop.   Pulmonary:      Effort: Pulmonary effort is normal. No respiratory distress.      Breath sounds: Normal breath sounds. No stridor. No wheezing, rhonchi or rales.   Chest:      Chest wall: No tenderness.   Musculoskeletal:      Cervical back: Normal range of motion.   Lymphadenopathy:      Cervical: No cervical adenopathy.   Skin:     General: Skin is warm and dry.   Neurological:      General: No focal deficit present.      Mental Status: She is alert and oriented to person, place, and time. Mental status is at baseline.   Psychiatric:         Mood and Affect: Mood normal.         Behavior: Behavior normal.           Disclaimer: This note was generated with voice recognition software.  Phonetic, grammatical, and spelling errors may be present as a result.  Please contact provider with any concerns or questions

## 2024-04-16 NOTE — LETTER
April 16, 2024     Patient: Vaelrie Gay  YOB: 1975  Date of Visit: 4/16/2024      To Whom it May Concern:    Valerie Gay is under my professional care. Valerie was seen in my office on 4/16/2024. Valerie may return to work on 4/17/24 .    If you have any questions or concerns, please don't hesitate to call.         Sincerely,          Henny Gaviria PA-C        CC: No Recipients

## 2024-05-06 ENCOUNTER — OFFICE VISIT (OUTPATIENT)
Dept: INTERNAL MEDICINE CLINIC | Facility: OTHER | Age: 49
End: 2024-05-06
Payer: COMMERCIAL

## 2024-05-06 VITALS
SYSTOLIC BLOOD PRESSURE: 110 MMHG | TEMPERATURE: 99.2 F | HEART RATE: 80 BPM | DIASTOLIC BLOOD PRESSURE: 80 MMHG | HEIGHT: 67 IN | WEIGHT: 158 LBS | OXYGEN SATURATION: 98 % | BODY MASS INDEX: 24.8 KG/M2

## 2024-05-06 DIAGNOSIS — Z13.220 ENCOUNTER FOR LIPID SCREENING FOR CARDIOVASCULAR DISEASE: ICD-10-CM

## 2024-05-06 DIAGNOSIS — Z00.00 ANNUAL PHYSICAL EXAM: Primary | ICD-10-CM

## 2024-05-06 DIAGNOSIS — J06.9 UPPER RESPIRATORY TRACT INFECTION, UNSPECIFIED TYPE: ICD-10-CM

## 2024-05-06 DIAGNOSIS — D50.8 OTHER IRON DEFICIENCY ANEMIA: ICD-10-CM

## 2024-05-06 DIAGNOSIS — E55.9 VITAMIN D DEFICIENCY: ICD-10-CM

## 2024-05-06 DIAGNOSIS — Z13.6 ENCOUNTER FOR LIPID SCREENING FOR CARDIOVASCULAR DISEASE: ICD-10-CM

## 2024-05-06 DIAGNOSIS — E61.1 IRON DEFICIENCY: ICD-10-CM

## 2024-05-06 PROCEDURE — 99396 PREV VISIT EST AGE 40-64: CPT | Performed by: INTERNAL MEDICINE

## 2024-05-06 RX ORDER — AMOXICILLIN AND CLAVULANATE POTASSIUM 875; 125 MG/1; MG/1
1 TABLET, FILM COATED ORAL EVERY 12 HOURS SCHEDULED
Qty: 14 TABLET | Refills: 0 | Status: SHIPPED | OUTPATIENT
Start: 2024-05-06 | End: 2024-05-13

## 2024-05-06 NOTE — ASSESSMENT & PLAN NOTE
Discussed diet and exercise.  She is up-to-date on breast cancer screening.  She is up-to-date on colorectal cancer screening.  CBC, CMP, lipid panel ordered for diabetes, metabolic, and cardiovascular lipid screening

## 2024-05-06 NOTE — PROGRESS NOTES
ADULT ANNUAL PHYSICAL  [facility] PRIMARY CARE [place]    NAME: Valerie Gay  AGE: 48 y.o. SEX: female  : 1975     DATE: 2024     Assessment and Plan:     Problem List Items Addressed This Visit        Respiratory    Upper respiratory tract infection     Will treat with Augmentin twice daily for 7 days.         Relevant Medications    amoxicillin-clavulanate (AUGMENTIN) 875-125 mg per tablet       Blood    Anemia, iron deficiency    Relevant Orders    CBC    Comprehensive metabolic panel    Iron Panel (Includes Ferritin, Iron Sat%, Iron, and TIBC)       Other    Annual physical exam - Primary     Discussed diet and exercise.  She is up-to-date on breast cancer screening.  She is up-to-date on colorectal cancer screening.  CBC, CMP, lipid panel ordered for diabetes, metabolic, and cardiovascular lipid screening         Vitamin D deficiency    Relevant Orders    Vitamin D 25 hydroxy   Other Visit Diagnoses     Encounter for lipid screening for cardiovascular disease        Relevant Orders    Lipid panel    Iron deficiency                Immunizations and preventive care screenings were discussed with patient today. Appropriate education was printed on patient's after visit summary.    Counseling:  Alcohol/drug use: discussed moderation in alcohol intake, the recommendations for healthy alcohol use, and avoidance of illicit drug use.  Dental Health: discussed importance of regular tooth brushing, flossing, and dental visits.  Injury prevention: discussed safety/seat belts, safety helmets, smoke detectors, carbon dioxide detectors, and smoking near bedding or upholstery.  Sexual health: discussed sexually transmitted diseases, partner selection, use of condoms, avoidance of unintended pregnancy, and contraceptive alternatives.  Exercise: the importance of regular exercise/physical activity was discussed. Recommend exercise 3-5 times per week for at  least 30 minutes.          Return in about 1 year (around 5/6/2025) for Annual physical.     Chief Complaint:     Chief Complaint   Patient presents with   • Physical Exam      History of Present Illness:     Adult Annual Physical   Patient here for a comprehensive physical exam. The patient reports problems - Perisstent URI symptoms since 1 month. She reports persistent mild congestion, sore throat, and cough .    Diet and Physical Activity  Diet/Nutrition: well balanced diet, consuming 3-5 servings of fruits/vegetables daily, adequate fiber intake, and adequate whole grain intake.   Exercise: no formal exercise.      Depression Screening  PHQ-2/9 Depression Screening         General Health  Sleep: sleeps well and gets 7-8 hours of sleep on average.   Hearing: normal - bilateral.  Vision: no vision problems, goes for regular eye exams, and wears glasses.   Dental: regular dental visits, brushes teeth twice daily, and flosses teeth occasionally.       /GYN Health  Follows with gynecology? yes   Patient is: postmenopausal  Contraceptive method:  s/p partial hysterectomy      Review of Systems:     Review of Systems   Constitutional:  Negative for activity change, appetite change, chills, diaphoresis, fatigue and fever.   HENT:  Negative for congestion, postnasal drip, rhinorrhea, sinus pressure, sinus pain, sneezing and sore throat.    Eyes:  Negative for visual disturbance.   Respiratory:  Negative for apnea, cough, choking, chest tightness, shortness of breath and wheezing.    Cardiovascular:  Negative for chest pain, palpitations and leg swelling.   Gastrointestinal:  Negative for abdominal distention, abdominal pain, anal bleeding, blood in stool, constipation, diarrhea, nausea and vomiting.   Endocrine: Negative for cold intolerance and heat intolerance.   Genitourinary:  Negative for difficulty urinating, dysuria and hematuria.   Musculoskeletal: Negative.    Skin: Negative.    Neurological:  Negative for  dizziness, weakness, light-headedness, numbness and headaches.   Hematological:  Negative for adenopathy.   Psychiatric/Behavioral:  Negative for agitation, sleep disturbance and suicidal ideas.    All other systems reviewed and are negative.     Past Medical History:     Past Medical History:   Diagnosis Date   • Anemia     iron def   • Bulla of lung (HCC)    • Clotting disorder (HCC)     AVM   • Coccydynia    • Family history of vitamin B12 deficiency    • Fibroid, uterine    • Headache(784.0)    • Hematuria     last assessed: 2013   • History of hypertension    • History of morbid obesity    • History of vitamin B deficiency    • History of vitamin D deficiency    • Hypocalcemia    • Iron deficiency    • Kyphosis deformity of spine    • Menopause    • Migraine    • Narrow angle glaucoma suspect of both eyes    • PONV (postoperative nausea and vomiting)     nausea only   • Postgastrectomy malabsorption    • Seasonal allergies    • Vitamin A deficiency     last assessed: 2014   • Wears glasses       Past Surgical History:     Past Surgical History:   Procedure Laterality Date   • APPENDECTOMY     • AUGMENTATION MAMMAPLASTY Bilateral 2018   • BARIATRIC SURGERY     • BONE EXOSTOSIS EXCISION Right 2023    Procedure: EXOSTECTOMY OF FOOT;  Surgeon: Linden Cowart DPM;  Location:  MAIN OR;  Service: Podiatry   • BRAIN SURGERY      AVM   • BREAST BIOPSY Left 10/12/218    x2   • BREAST LUMPECTOMY     • BUNIONECTOMY Right 10/02/2019    Procedure: EXOSTECTOMY RIGHT FOOT;  Surgeon: Linden Cowart DPM;  Location:  MAIN OR;  Service: Podiatry   •  SECTION     • COLONOSCOPY W/ POLYPECTOMY  2022    5 YEAR RECOMMENDED F/U   • COSMETIC SURGERY      tummy tuck,breast implants and lift   • COSMETIC SURGERY      01/10/2024 skin resection, thigh buttocks and back   • ENDOMETRIAL ABLATION  2018   • HYSTERECTOMY     • ID LAPS SUPRACRV HYSTERECT 250 GM/< RMVL TUBE/OVAR N/A  09/16/2019    Procedure: HYSTERECTOMY LAPAROSCOPIC SUPRACERVICAL ([facility])  WITH B/L SALPINGECTOMY. EXCISION OF ENDOMETRIAL CYST;  Surgeon: Leo Bustillo DO;  Location: [facility];  Service: Gynecology   • REFRACTIVE SURGERY      glaucoma   • HITESH-EN-Y PROCEDURE  2014   • TONSILLECTOMY AND ADENOIDECTOMY      including adenoids   • TOOTH EXTRACTION     • TUBAL LIGATION     • US GUIDANCE BREAST BIOPSY LEFT EACH ADDITIONAL Left 10/12/2018   • US GUIDED BREAST BIOPSY LEFT COMPLETE Left 10/12/2018      Social History:     Social History     Socioeconomic History   • Marital status: Single     Spouse name: None   • Number of children: None   • Years of education: None   • Highest education level: None   Occupational History   • None   Tobacco Use   • Smoking status: Never   • Smokeless tobacco: Never   Vaping Use   • Vaping status: Never Used   Substance and Sexual Activity   • Alcohol use: Yes     Alcohol/week: 1.0 standard drink of alcohol     Types: 1 Standard drinks or equivalent per week     Comment: social   • Drug use: No   • Sexual activity: Yes     Partners: Male     Birth control/protection: Female Sterilization, Other     Comment: hysterectomy   Other Topics Concern   • None   Social History Narrative   • None     Social Determinants of Health     Financial Resource Strain: Not on file   Food Insecurity: Not on file   Transportation Needs: Not on file   Physical Activity: Not on file   Stress: Not on file   Social Connections: Not on file   Intimate Partner Violence: Not on file   Housing Stability: Not on file      Family History:     Family History   Problem Relation Age of Onset   • Hypertension Mother    • Atrial fibrillation Mother    • Melanoma Mother    • Cancer Mother         Melanoma   • Graves' disease Mother    • Lung cancer Mother    • Colon polyps Mother    • Thyroid disease Mother    • Hypertension Father    • Atrial fibrillation Father    • Diabetes Father         Type 2   • Obesity Father    •  "Allergies Father    • Heart disease Father    • Colon polyps Father    • Diverticulitis Father    • No Known Problems Daughter    • No Known Problems Maternal Grandmother    • Lung cancer Maternal Grandfather    • Cancer Maternal Grandfather         Lung   • Stroke Paternal Grandmother    • Lung cancer Paternal Grandfather    • Cancer Paternal Grandfather         Lung   • Substance Abuse Brother    • Stomach cancer Maternal Aunt    • Other Maternal Aunt         GIST, malignant-per [employer]   • Stomach cancer Paternal Aunt    • No Known Problems Paternal Aunt    • No Known Problems Paternal Aunt    • No Known Problems Paternal Aunt    • No Known Problems Paternal Aunt    • Depression Neg Hx    • Anxiety disorder Neg Hx    • Drug abuse Neg Hx    • Breast cancer Neg Hx       Current Medications:     Current Outpatient Medications   Medication Sig Dispense Refill   • amoxicillin-clavulanate (AUGMENTIN) 875-125 mg per tablet Take 1 tablet by mouth every 12 (twelve) hours for 7 days 14 tablet 0   • ergocalciferol (VITAMIN D2) 50,000 units Take 1 capsule (50,000 Units total) by mouth once a week 12 capsule 1     No current facility-administered medications for this visit.      Allergies:     Allergies   Allergen Reactions   • Pollen Extract Allergic Rhinitis   • Suture Other (See Comments)     Red and break open      Physical Exam:     /80 (BP Location: Left arm, Patient Position: Sitting, Cuff Size: Standard)   Pulse 80   Temp 99.2 °F (37.3 °C) (Temporal)   Ht 5' 7\" (1.702 m)   Wt 71.7 kg (158 lb)   LMP 09/09/2019   SpO2 98%   BMI 24.75 kg/m²     Physical Exam  Vitals and nursing note reviewed.   Constitutional:       General: She is not in acute distress.     Appearance: Normal appearance. She is normal weight. She is not ill-appearing.   HENT:      Head: Normocephalic and atraumatic.      Right Ear: Tympanic membrane, ear canal and external ear normal. There is no impacted cerumen.      Left Ear: Tympanic " membrane, ear canal and external ear normal. There is no impacted cerumen.      Nose: Nose normal. No congestion or rhinorrhea.      Mouth/Throat:      Mouth: Mucous membranes are moist.      Pharynx: Oropharynx is clear. No oropharyngeal exudate or posterior oropharyngeal erythema.   Eyes:      General: No scleral icterus.        Right eye: No discharge.         Left eye: No discharge.      Extraocular Movements: Extraocular movements intact.      Conjunctiva/sclera: Conjunctivae normal.      Pupils: Pupils are equal, round, and reactive to light.   Neck:      Vascular: No carotid bruit.   Cardiovascular:      Rate and Rhythm: Normal rate and regular rhythm.      Pulses: Normal pulses.      Heart sounds: Normal heart sounds. No murmur heard.     No friction rub. No gallop.   Pulmonary:      Effort: Pulmonary effort is normal. No respiratory distress.      Breath sounds: Normal breath sounds. No stridor. No wheezing, rhonchi or rales.   Chest:      Chest wall: No tenderness.   Abdominal:      General: Abdomen is flat. Bowel sounds are normal. There is no distension.      Palpations: Abdomen is soft. There is no mass.      Tenderness: There is no abdominal tenderness.      Hernia: No hernia is present.   Musculoskeletal:         General: No swelling, tenderness, deformity or signs of injury. Normal range of motion.      Cervical back: Normal range of motion and neck supple. No rigidity. No muscular tenderness.      Right lower leg: No edema.      Left lower leg: No edema.   Lymphadenopathy:      Cervical: No cervical adenopathy.   Skin:     General: Skin is warm and dry.      Capillary Refill: Capillary refill takes less than 2 seconds.      Coloration: Skin is not jaundiced or pale.      Findings: No bruising, erythema, lesion or rash.   Neurological:      General: No focal deficit present.      Mental Status: She is alert and oriented to person, place, and time. Mental status is at baseline.      Cranial Nerves: No  cranial nerve deficit.      Sensory: No sensory deficit.      Motor: No weakness.      Coordination: Coordination normal.      Gait: Gait normal.      Deep Tendon Reflexes: Reflexes normal.   Psychiatric:         Mood and Affect: Mood normal.         Behavior: Behavior normal.         Thought Content: Thought content normal.         Judgment: Judgment normal.          Carlos Rowe MD  [facility]

## 2024-05-20 ENCOUNTER — NURSE TRIAGE (OUTPATIENT)
Age: 49
End: 2024-05-20

## 2024-05-20 NOTE — TELEPHONE ENCOUNTER
"Patient called states she has been experiencing right breast pain/tenderness behind nipple for about 6 days. Denies nipple discharge, current fever, redness or rash. States unsure if lump is felt. States has history of prior problems with breasts and has implants. Recently seen by PCP and treated with antibiotics for URI. Scheduled appt with provider 5/24. Advised to reach out with worsening symptoms. Care advice reviewed. Patient verbalized understanding. No further questions at this time.     Reason for Disposition   Breast implants and pain, asymmetry, or other concerns    Answer Assessment - Initial Assessment Questions  1. SYMPTOM: \"What's the main symptom you're concerned about?\"  (e.g., lump, pain, rash, nipple discharge)      Right breast pain behind nipple and to left. Tenderness. Titanium markers in one breast   2. LOCATION: \"Where is the pain located?\"      Behind nipple  3. ONSET: \"When did symptoms  start?\"      6 days ago  4. PRIOR HISTORY: \"Do you have any history of prior problems with your breasts?\" (e.g., lumps, cancer, fibrocystic breast disease)      Lumpectomy, cysts  5. CAUSE: \"What do you think is causing this symptom?\"      unknown  6. OTHER SYMPTOMS: \"Do you have any other symptoms?\" (e.g., fever, breast pain, redness or rash, nipple discharge)      Fever - been on antibiotics. Finished last Monday   7. PREGNANCY-BREASTFEEDING: \"Is there any chance you are pregnant?\" \"When was your last menstrual period?\" \"Are you breastfeeding?\"     hysterectomy    Protocols used: Breast Symptoms-ADULT-[place]    "

## 2024-05-24 ENCOUNTER — OFFICE VISIT (OUTPATIENT)
Dept: GYNECOLOGY | Facility: CLINIC | Age: 49
End: 2024-05-24
Payer: COMMERCIAL

## 2024-05-24 VITALS
BODY MASS INDEX: 24.89 KG/M2 | HEART RATE: 70 BPM | WEIGHT: 158.6 LBS | HEIGHT: 67 IN | DIASTOLIC BLOOD PRESSURE: 60 MMHG | SYSTOLIC BLOOD PRESSURE: 98 MMHG

## 2024-05-24 DIAGNOSIS — N64.4 MASTODYNIA OF RIGHT BREAST: Primary | ICD-10-CM

## 2024-05-24 PROCEDURE — 99213 OFFICE O/P EST LOW 20 MIN: CPT | Performed by: OBSTETRICS & GYNECOLOGY

## 2024-05-24 NOTE — PROGRESS NOTES
"Ambulatory Visit  Name: Valerie Gay      : 1975      MRN: [id number]  Encounter Provider: Leo Bustillo DO  Encounter Date: 2024   Encounter department: [facility]    Assessment & Plan   1. Mastodynia of right breast    Will check diagnostic right mammogram and ultrasound    History of Present Illness     Valerie Gay is a 48 y.o. female who presents complaining of right breast pain.  She has had this intermittently over the past 10 days.  Touching the breast increases the discomfort.  She denies any recent trauma, upper respiratory tract infection, or breast drainage.  Last mammogram was in March of this year which was benign with with dense breast changes with some postsurgical changes.  Patient does have bilateral breast implants.  She has had a prior right breast biopsy.    Review of Systems    Objective     BP 98/60   Pulse 70   Ht 5' 7\" (1.702 m)   Wt 71.9 kg (158 lb 9.6 oz)   LMP 2019   BMI 24.84 kg/m²     Physical Exam  Vitals reviewed.   Chest:   Breasts:     Right: Tenderness present. No swelling, bleeding, inverted nipple, mass, nipple discharge or skin change.      Left: No swelling, bleeding, inverted nipple, mass, nipple discharge, skin change or tenderness.      Comments: Bilateral breast implants.  Tenderness just adjacent to the right areola 9 o'clock position.  No masses appreciated  Musculoskeletal:      Cervical back: Normal range of motion and neck supple. No tenderness.   Lymphadenopathy:      Cervical: No cervical adenopathy.      Upper Body:      Right upper body: No supraclavicular, axillary or pectoral adenopathy.      Left upper body: No supraclavicular, axillary or pectoral adenopathy.   Neurological:      Mental Status: She is alert.       Administrative Statements           "

## 2024-05-30 ENCOUNTER — APPOINTMENT (OUTPATIENT)
Dept: LAB | Facility: HOSPITAL | Age: 49
End: 2024-05-30
Payer: COMMERCIAL

## 2024-05-30 DIAGNOSIS — D50.8 OTHER IRON DEFICIENCY ANEMIA: ICD-10-CM

## 2024-05-30 DIAGNOSIS — Z13.220 ENCOUNTER FOR LIPID SCREENING FOR CARDIOVASCULAR DISEASE: ICD-10-CM

## 2024-05-30 DIAGNOSIS — E55.9 VITAMIN D DEFICIENCY: ICD-10-CM

## 2024-05-30 DIAGNOSIS — Z13.6 ENCOUNTER FOR LIPID SCREENING FOR CARDIOVASCULAR DISEASE: ICD-10-CM

## 2024-05-30 LAB
25(OH)D3 SERPL-MCNC: 44.3 NG/ML (ref 30–100)
ALBUMIN SERPL BCP-MCNC: 4 G/DL (ref 3.5–5)
ALP SERPL-CCNC: 61 U/L (ref 34–104)
ALT SERPL W P-5'-P-CCNC: 8 U/L (ref 7–52)
ANION GAP SERPL CALCULATED.3IONS-SCNC: 7 MMOL/L (ref 4–13)
AST SERPL W P-5'-P-CCNC: 16 U/L (ref 13–39)
BILIRUB SERPL-MCNC: 0.92 MG/DL (ref 0.2–1)
BUN SERPL-MCNC: 13 MG/DL (ref 5–25)
CALCIUM SERPL-MCNC: 8.7 MG/DL (ref 8.4–10.2)
CHLORIDE SERPL-SCNC: 105 MMOL/L (ref 96–108)
CHOLEST SERPL-MCNC: 146 MG/DL
CO2 SERPL-SCNC: 25 MMOL/L (ref 21–32)
CREAT SERPL-MCNC: 0.57 MG/DL (ref 0.6–1.3)
ERYTHROCYTE [DISTWIDTH] IN BLOOD BY AUTOMATED COUNT: 16.8 % (ref 11.6–15.1)
FERRITIN SERPL-MCNC: 4 NG/ML (ref 11–307)
GFR SERPL CREATININE-BSD FRML MDRD: 109 ML/MIN/1.73SQ M
GLUCOSE P FAST SERPL-MCNC: 76 MG/DL (ref 65–99)
HCT VFR BLD AUTO: 36.5 % (ref 34.8–46.1)
HDLC SERPL-MCNC: 68 MG/DL
HGB BLD-MCNC: 11.4 G/DL (ref 11.5–15.4)
IRON SATN MFR SERPL: 14 % (ref 15–50)
IRON SERPL-MCNC: 59 UG/DL (ref 50–212)
LDLC SERPL CALC-MCNC: 70 MG/DL (ref 0–100)
MCH RBC QN AUTO: 25.5 PG (ref 26.8–34.3)
MCHC RBC AUTO-ENTMCNC: 31.2 G/DL (ref 31.4–37.4)
MCV RBC AUTO: 82 FL (ref 82–98)
NONHDLC SERPL-MCNC: 78 MG/DL
PLATELET # BLD AUTO: 258 THOUSANDS/UL (ref 149–390)
PMV BLD AUTO: 10.5 FL (ref 8.9–12.7)
POTASSIUM SERPL-SCNC: 3.8 MMOL/L (ref 3.5–5.3)
PROT SERPL-MCNC: 6.4 G/DL (ref 6.4–8.4)
RBC # BLD AUTO: 4.47 MILLION/UL (ref 3.81–5.12)
SODIUM SERPL-SCNC: 137 MMOL/L (ref 135–147)
TIBC SERPL-MCNC: 418 UG/DL (ref 250–450)
TRIGL SERPL-MCNC: 40 MG/DL
UIBC SERPL-MCNC: 359 UG/DL (ref 155–355)
WBC # BLD AUTO: 5.33 THOUSAND/UL (ref 4.31–10.16)

## 2024-05-30 PROCEDURE — 82728 ASSAY OF FERRITIN: CPT

## 2024-05-30 PROCEDURE — 85027 COMPLETE CBC AUTOMATED: CPT

## 2024-05-30 PROCEDURE — 82306 VITAMIN D 25 HYDROXY: CPT

## 2024-05-30 PROCEDURE — 83540 ASSAY OF IRON: CPT

## 2024-05-30 PROCEDURE — 83550 IRON BINDING TEST: CPT

## 2024-05-30 PROCEDURE — 36415 COLL VENOUS BLD VENIPUNCTURE: CPT

## 2024-05-30 PROCEDURE — 80053 COMPREHEN METABOLIC PANEL: CPT

## 2024-05-30 PROCEDURE — 80061 LIPID PANEL: CPT

## 2024-06-05 DIAGNOSIS — D50.8 OTHER IRON DEFICIENCY ANEMIA: Primary | ICD-10-CM

## 2024-06-11 ENCOUNTER — HOSPITAL ENCOUNTER (OUTPATIENT)
Dept: ULTRASOUND IMAGING | Facility: CLINIC | Age: 49
Discharge: HOME | End: 2024-06-11
Payer: COMMERCIAL

## 2024-06-11 VITALS — BODY MASS INDEX: 24.8 KG/M2 | WEIGHT: 158 LBS | HEIGHT: 67 IN

## 2024-06-11 DIAGNOSIS — N64.4 MASTODYNIA OF RIGHT BREAST: ICD-10-CM

## 2024-06-11 PROCEDURE — 76642 ULTRASOUND BREAST LIMITED: CPT

## 2024-08-05 ENCOUNTER — TELEPHONE (OUTPATIENT)
Age: 49
End: 2024-08-05

## 2024-08-05 ENCOUNTER — TELEMEDICINE (OUTPATIENT)
Dept: INTERNAL MEDICINE CLINIC | Facility: OTHER | Age: 49
End: 2024-08-05
Payer: COMMERCIAL

## 2024-08-05 VITALS — WEIGHT: 158 LBS | HEIGHT: 67 IN | BODY MASS INDEX: 24.8 KG/M2

## 2024-08-05 DIAGNOSIS — U07.1 COVID-19: Primary | ICD-10-CM

## 2024-08-05 PROCEDURE — 99213 OFFICE O/P EST LOW 20 MIN: CPT | Performed by: INTERNAL MEDICINE

## 2024-08-05 RX ORDER — ACETAMINOPHEN 500 MG
500 TABLET ORAL EVERY 6 HOURS PRN
COMMUNITY

## 2024-08-05 RX ORDER — NIRMATRELVIR AND RITONAVIR 300-100 MG
3 KIT ORAL 2 TIMES DAILY
Qty: 30 TABLET | Refills: 0 | Status: SHIPPED | OUTPATIENT
Start: 2024-08-05 | End: 2024-08-10

## 2024-08-05 RX ORDER — BENZONATATE 100 MG/1
100 CAPSULE ORAL 3 TIMES DAILY PRN
Qty: 20 CAPSULE | Refills: 0 | Status: SHIPPED | OUTPATIENT
Start: 2024-08-05

## 2024-08-05 NOTE — TELEPHONE ENCOUNTER
Patient called, tested positive for covid yesterday, was wondering if Dr Rowe could send her a prescription over to [id number] - [place], [place] - [address] [place] [address]  or if patient needs to come in. Please advise and contact pt.

## 2024-08-05 NOTE — ASSESSMENT & PLAN NOTE
Discussed over-the-counter medications to take for symptom relief.  She would like to start Paxlovid.  Will also send benzonatate for cough.  Discussed droplet precautions.

## 2024-08-05 NOTE — PROGRESS NOTES
COVID-19 Outpatient Progress Note  Name: Valerie Gay      : 1975      MRN: [id number]  Encounter Provider: Carlos Rowe MD  Encounter Date: 2024   Encounter department: [facility]    Assessment & Plan   1. COVID-19  Assessment & Plan:  Discussed over-the-counter medications to take for symptom relief.  She would like to start Paxlovid.  Will also send benzonatate for cough.  Discussed droplet precautions.  Orders:  -     nirmatrelvir & ritonavir (Paxlovid, 300/100,) tablet therapy pack; Take 3 tablets by mouth 2 (two) times a day for 5 days Take 2 nirmatrelvir tablets + 1 ritonavir tablet together per dose  -     benzonatate (TESSALON PERLES) 100 mg capsule; Take 1 capsule (100 mg total) by mouth 3 (three) times a day as needed for cough  Disposition:     Patient was advised that they can go back to your normal activities when, for at least 24 hours, both are true: their symptoms are getting better overall and they have not had a fever (and are not using fever-reducing medication).    When going back to your normal activities, take added precaution over the next 5 days, such as taking additional steps for  air, hygiene, masks, physical distancing, and/or testing when you will be around other people indoors. You may still be able to spread the virus that made you sick, even if you are feeling better.    If patient develops a fever or starts to feel worse after they have gone back to normal activities, stay home and away from others again until, for at least 24 hours, both are true: symptoms are improving overall and they have not had a fever (and are not using fever-reducing medication). Then take added precaution for the next 5 days.      Discussed symptom directed medication options with patient. Discussed vitamin D, vitamin C, and/or zinc supplementation with patient.     Patient meets criteria for Paxlovid and they have been counseled appropriately  regarding risks, benefits, side effects, and alternative treatment options. After discussion, patient agrees to treatment.    Possible side effects of Paxlovid?    Possible side effects of Paxlovid are:  - Liver Problems. Notify us right away if you start to experience loss of appetite, yellowing of your skin and the whites of eyes (jaundice), dark-colored urine, pale colored stools and itchy skin, stomach area (abdominal) pain.  - Resistance to HIV Medicines. If you have untreated HIV infection, Paxlovid may lead to some HIV medicines not working as well in the future.  - Other possible side effects include: altered sense of taste, diarrhea, high blood pressure, or muscle aches.    I have spent a total time of 15 minutes on the day of the encounter for this patient including discussing diagnostic results, discussing prognosis, risks and benefits of treatment options, instructions for management, patient and family education, importance of treatment compliance, risk factor reductions, impressions, counseling/coordination of care, documenting in the medical record and communicating with other healthcare professionals.          Encounter provider: Carlos Rowe MD     Provider located at: [facility]  [address] [place] [id number]     Recent Visits  No visits were found meeting these conditions.  Showing recent visits within past 7 days and meeting all other requirements  Today's Visits  Date Type Provider Dept   08/05/24 Telemedicine Carlos Rowe MD [facility]   Showing today's visits and meeting all other requirements  Future Appointments  No visits were found meeting these conditions.  Showing future appointments within next 150 days and meeting all other requirements    History of Present Illness      This virtual check-in was done via [employer] and patient was informed that  this is a secure, HIPAA-compliant platform. She agrees to proceed.    Patient agrees to participate in a virtual check in via telephone or video visit instead of presenting to the office to address urgent/immediate medical needs. Patient is aware this is a billable service. She acknowledged consent and understanding of privacy and security of the video platform. The patient has agreed to participate and understands they can discontinue the visit at any time.    After connecting through [employer], the patient was identified by name and date of birth. Valerie Gay was informed that this was a telemedicine visit and that the exam was being conducted confidentially over secure lines. My office door was closed. No one else was in the room. Valerie Gay acknowledged consent and understanding of privacy and security of the telemedicine visit. I informed the patient that I have reviewed her record in Epic and presented the opportunity for her to ask any questions regarding the visit today. The patient agreed to participate.     Verification of patient location:  Patient is located in the following state in which I hold an active license: [place]    Subjective:   Valerie Gay is a 49 y.o. female who has been screened for COVID-19. Symptom change since last report: unchanged. Patient's symptoms include fever, chills, malaise, nasal congestion, rhinorrhea and cough. Patient denies fatigue, sore throat, shortness of breath, chest tightness, abdominal pain, nausea, vomiting, diarrhea and headaches.     - Date of symptom onset: 8/3/2024  - Date of positive COVID-19 test: 8/4/2024. Type of test: Home antigen. Patient with typical symptoms of COVID-19 and they attest that they were positive on home rapid antigen testing. Image of positive result is not able to be uploaded into their chart.     COVID-19 vaccination status: Fully vaccinated with booster    Valerie has been staying home and has isolated themselves in her home. She  "is taking care to not share personal items and is cleaning all surfaces that are touched often, like counters, tabletops, and doorknobs using household cleaning sprays or wipes. She is wearing a mask when she leaves her room.     Valerie Gay is seen today after testing positive for COVID-19.  She recently returned from a cruise trip.  Symptoms started 2 days ago.  She tested positive for COVID yesterday.  She has been taking Tylenol, Dayquil, and Nyquil for symptom relief.    Lab Results   Component Value Date    SARSCOV2 Negative 08/14/2021    SARSCOV2 Not Detected 10/07/2020    SARSCOVAG Positive (A) 11/20/2022       Review of Systems   Constitutional:  Positive for chills and fever. Negative for activity change, appetite change, diaphoresis and fatigue.   HENT:  Positive for congestion and rhinorrhea. Negative for postnasal drip, sinus pressure, sinus pain, sneezing and sore throat.    Eyes:  Negative for visual disturbance.   Respiratory:  Positive for cough. Negative for apnea, choking, chest tightness, shortness of breath and wheezing.    Cardiovascular:  Negative for chest pain, palpitations and leg swelling.   Gastrointestinal:  Negative for abdominal distention, abdominal pain, anal bleeding, blood in stool, constipation, diarrhea, nausea and vomiting.   Endocrine: Negative for cold intolerance and heat intolerance.   Genitourinary:  Negative for difficulty urinating, dysuria and hematuria.   Musculoskeletal: Negative.    Skin: Negative.    Neurological:  Negative for dizziness, weakness, light-headedness, numbness and headaches.   Hematological:  Negative for adenopathy.   Psychiatric/Behavioral:  Negative for agitation, sleep disturbance and suicidal ideas.    All other systems reviewed and are negative.    Objective     Ht 5' 7\" (1.702 m)   Wt 71.7 kg (158 lb)   LMP 09/09/2019   BMI 24.75 kg/m²     Physical Exam  Vitals and nursing note reviewed.   Constitutional:       General: She is not in " acute distress.     Appearance: Normal appearance. She is not ill-appearing or toxic-appearing.   HENT:      Head: Normocephalic and atraumatic.      Mouth/Throat:      Mouth: Mucous membranes are moist.      Pharynx: Oropharynx is clear. No oropharyngeal exudate or posterior oropharyngeal erythema.   Eyes:      General: No scleral icterus.     Extraocular Movements: Extraocular movements intact.      Conjunctiva/sclera: Conjunctivae normal.   Pulmonary:      Effort: Pulmonary effort is normal. No respiratory distress.   Abdominal:      General: Abdomen is flat.      Tenderness: There is no abdominal tenderness.   Musculoskeletal:         General: No swelling, deformity or signs of injury. Normal range of motion.      Cervical back: Normal range of motion.      Right lower leg: No edema.      Left lower leg: No edema.   Skin:     General: Skin is warm and dry.      Coloration: Skin is not jaundiced or pale.      Findings: No bruising, erythema, lesion or rash.   Neurological:      General: No focal deficit present.      Mental Status: She is alert and oriented to person, place, and time. Mental status is at baseline.      Cranial Nerves: No cranial nerve deficit.      Sensory: No sensory deficit.   Psychiatric:         Mood and Affect: Mood normal.         Behavior: Behavior normal.         Thought Content: Thought content normal.         Judgment: Judgment normal.

## 2024-11-05 ENCOUNTER — ANNUAL EXAM (OUTPATIENT)
Dept: GYNECOLOGY | Facility: CLINIC | Age: 49
End: 2024-11-05
Payer: COMMERCIAL

## 2024-11-05 VITALS
SYSTOLIC BLOOD PRESSURE: 106 MMHG | DIASTOLIC BLOOD PRESSURE: 64 MMHG | WEIGHT: 155.4 LBS | BODY MASS INDEX: 24.39 KG/M2 | HEART RATE: 68 BPM | HEIGHT: 67 IN

## 2024-11-05 DIAGNOSIS — Z01.419 ENCOUNTER FOR GYNECOLOGICAL EXAMINATION WITHOUT ABNORMAL FINDING: Primary | ICD-10-CM

## 2024-11-05 DIAGNOSIS — Z12.31 ENCOUNTER FOR SCREENING MAMMOGRAM FOR MALIGNANT NEOPLASM OF BREAST: ICD-10-CM

## 2024-11-05 PROCEDURE — G0145 SCR C/V CYTO,THINLAYER,RESCR: HCPCS | Performed by: OBSTETRICS & GYNECOLOGY

## 2024-11-05 PROCEDURE — S0612 ANNUAL GYNECOLOGICAL EXAMINA: HCPCS | Performed by: OBSTETRICS & GYNECOLOGY

## 2024-11-05 NOTE — PROGRESS NOTES
Ambulatory Visit  Name: Valerie Gay      : 1975      MRN: [id number]  Encounter Provider: Leo Bustillo DO  Encounter Date: 2024   Encounter department: [facility]YN[facility]    Assessment & Plan  Encounter for gynecological examination without abnormal finding           History of Present Illness     Valerie Gay is a 49 y.o. female who presents for annual examination.  Status post [facility] BS in 2019.  Denies any vaginal irritation, burning, discharge or bleeding.  Denies any dysuria, hematuria urgency or urinary incontinence.  No GI complaints.    Colonoscopy 2022.  Repeat in 7 years secondary to polyp      Review of Systems   Constitutional: Negative.    HENT:  Negative for sore throat and trouble swallowing.    Gastrointestinal: Negative.    Genitourinary: Negative.            Objective     LMP 2019     Physical Exam  Vitals reviewed.   Constitutional:       Appearance: Normal appearance.   Cardiovascular:      Rate and Rhythm: Normal rate and regular rhythm.      Pulses: Normal pulses.      Heart sounds: Normal heart sounds. No murmur heard.  Pulmonary:      Effort: Pulmonary effort is normal. No respiratory distress.      Breath sounds: Normal breath sounds.   Chest:   Breasts:     Right: No swelling, bleeding, inverted nipple, mass, nipple discharge, skin change or tenderness.      Left: No swelling, bleeding, inverted nipple, mass, nipple discharge, skin change or tenderness.   Abdominal:      General: There is no distension.      Palpations: Abdomen is soft. There is no mass.      Tenderness: There is no abdominal tenderness. There is no guarding or rebound.      Hernia: No hernia is present. There is no hernia in the left inguinal area or right inguinal area.   Genitourinary:     General: Normal vulva.      Labia:         Right: No rash, tenderness or lesion.         Left: No rash, tenderness or lesion.       Vagina: Normal.      Cervix:  Normal.      Uterus: Absent.       Adnexa:         Right: No mass, tenderness or fullness.          Left: No mass, tenderness or fullness.     Musculoskeletal:      Cervical back: Normal range of motion and neck supple. No tenderness.   Lymphadenopathy:      Cervical: No cervical adenopathy.      Upper Body:      Right upper body: No supraclavicular, axillary or pectoral adenopathy.      Left upper body: No supraclavicular, axillary or pectoral adenopathy.      Lower Body: No right inguinal adenopathy. No left inguinal adenopathy.   Neurological:      Mental Status: She is alert.

## 2024-11-12 LAB
LAB AP GYN PRIMARY INTERPRETATION: NORMAL
Lab: NORMAL

## 2025-04-19 DIAGNOSIS — Z00.6 ENCOUNTER FOR EXAMINATION FOR NORMAL COMPARISON OR CONTROL IN CLINICAL RESEARCH PROGRAM: ICD-10-CM

## 2025-04-27 ENCOUNTER — APPOINTMENT (OUTPATIENT)
Dept: LAB | Age: 50
End: 2025-04-27
Payer: COMMERCIAL

## 2025-04-27 DIAGNOSIS — D50.8 OTHER IRON DEFICIENCY ANEMIA: ICD-10-CM

## 2025-04-27 DIAGNOSIS — Z00.6 ENCOUNTER FOR EXAMINATION FOR NORMAL COMPARISON OR CONTROL IN CLINICAL RESEARCH PROGRAM: ICD-10-CM

## 2025-04-27 LAB
ERYTHROCYTE [DISTWIDTH] IN BLOOD BY AUTOMATED COUNT: 14.7 % (ref 11.6–15.1)
HCT VFR BLD AUTO: 39.6 % (ref 34.8–46.1)
HGB BLD-MCNC: 11.8 G/DL (ref 11.5–15.4)
MCH RBC QN AUTO: 24.4 PG (ref 26.8–34.3)
MCHC RBC AUTO-ENTMCNC: 29.8 G/DL (ref 31.4–37.4)
MCV RBC AUTO: 82 FL (ref 82–98)
PLATELET # BLD AUTO: 294 THOUSANDS/UL (ref 149–390)
PMV BLD AUTO: 10.5 FL (ref 8.9–12.7)
RBC # BLD AUTO: 4.84 MILLION/UL (ref 3.81–5.12)
WBC # BLD AUTO: 5.95 THOUSAND/UL (ref 4.31–10.16)

## 2025-04-27 PROCEDURE — 85027 COMPLETE CBC AUTOMATED: CPT

## 2025-04-27 PROCEDURE — 36415 COLL VENOUS BLD VENIPUNCTURE: CPT

## 2025-05-06 LAB
APOB+LDLR+PCSK9 GENE MUT ANL BLD/T: NOT DETECTED
BRCA1+BRCA2 DEL+DUP + FULL MUT ANL BLD/T: NOT DETECTED
MLH1+MSH2+MSH6+PMS2 GN DEL+DUP+FUL M: NOT DETECTED

## 2025-06-02 ENCOUNTER — OFFICE VISIT (OUTPATIENT)
Dept: INTERNAL MEDICINE CLINIC | Facility: OTHER | Age: 50
End: 2025-06-02
Payer: COMMERCIAL

## 2025-06-02 VITALS
OXYGEN SATURATION: 98 % | DIASTOLIC BLOOD PRESSURE: 72 MMHG | HEART RATE: 72 BPM | SYSTOLIC BLOOD PRESSURE: 102 MMHG | HEIGHT: 67 IN | BODY MASS INDEX: 24.17 KG/M2 | RESPIRATION RATE: 18 BRPM | WEIGHT: 154 LBS | TEMPERATURE: 98.4 F

## 2025-06-02 DIAGNOSIS — B35.1 ONYCHOMYCOSIS OF TOENAIL: ICD-10-CM

## 2025-06-02 DIAGNOSIS — Z11.59 NEED FOR HEPATITIS C SCREENING TEST: ICD-10-CM

## 2025-06-02 DIAGNOSIS — Z00.00 ANNUAL PHYSICAL EXAM: ICD-10-CM

## 2025-06-02 DIAGNOSIS — J43.9 BULLA, LUNG (HCC): Primary | ICD-10-CM

## 2025-06-02 DIAGNOSIS — E61.1 IRON DEFICIENCY: ICD-10-CM

## 2025-06-02 DIAGNOSIS — M77.8 TENDONITIS OF FINGER: ICD-10-CM

## 2025-06-02 DIAGNOSIS — D50.8 OTHER IRON DEFICIENCY ANEMIA: ICD-10-CM

## 2025-06-02 DIAGNOSIS — E55.9 VITAMIN D DEFICIENCY: ICD-10-CM

## 2025-06-02 DIAGNOSIS — Z13.6 ENCOUNTER FOR LIPID SCREENING FOR CARDIOVASCULAR DISEASE: ICD-10-CM

## 2025-06-02 DIAGNOSIS — Z13.220 ENCOUNTER FOR LIPID SCREENING FOR CARDIOVASCULAR DISEASE: ICD-10-CM

## 2025-06-02 PROBLEM — J06.9 UPPER RESPIRATORY TRACT INFECTION: Status: RESOLVED | Noted: 2022-11-28 | Resolved: 2025-06-02

## 2025-06-02 PROBLEM — R10.31 RIGHT LOWER QUADRANT ABDOMINAL PAIN: Status: RESOLVED | Noted: 2023-07-31 | Resolved: 2025-06-02

## 2025-06-02 PROBLEM — U07.1 COVID-19: Status: RESOLVED | Noted: 2024-08-05 | Resolved: 2025-06-02

## 2025-06-02 PROCEDURE — 99396 PREV VISIT EST AGE 40-64: CPT | Performed by: INTERNAL MEDICINE

## 2025-06-02 PROCEDURE — 99214 OFFICE O/P EST MOD 30 MIN: CPT | Performed by: INTERNAL MEDICINE

## 2025-06-02 RX ORDER — METHYLPREDNISOLONE 4 MG/1
TABLET ORAL
Qty: 21 EACH | Refills: 0 | Status: SHIPPED | OUTPATIENT
Start: 2025-06-02

## 2025-06-02 RX ORDER — TERBINAFINE HYDROCHLORIDE 250 MG/1
250 TABLET ORAL DAILY
Qty: 42 TABLET | Refills: 1 | Status: SHIPPED | OUTPATIENT
Start: 2025-06-02 | End: 2025-08-25

## 2025-06-02 NOTE — PROGRESS NOTES
Adult Annual Physical  Name: Valerie Gay      : 1975      MRN: [id number]  Encounter Provider: Carlos Rowe MD  Encounter Date: 2025   Encounter department: [facility] [place]    :  Assessment & Plan  Onychomycosis of toenail  Will treat with terbinafine.  Trend LFTs.  Orders:  •  terbinafine (LamISIL) 250 mg tablet; Take 1 tablet (250 mg total) by mouth daily  •  Hepatic function panel; Future  •  Hepatic function panel; Future    Bulla, lung (HCC)  Stable, asymptomatic. Continue to monitor clinically. No further imaging for surveillance as per pulmonology.        Tendonitis of finger  Will prescribe Medrol dose pack. Discussed  ice compresses and reducing strain.   Orders:  •  methylPREDNISolone 4 MG tablet therapy pack; Use as directed on package    Annual physical exam  Discussed diet and exercise.  She is up-to-date on breast cancer screening.  She is up-to-date on colorectal cancer screening.  CBC, CMP, lipid panel ordered for diabetes, metabolic, and cardiovascular lipid screening       Need for hepatitis C screening test    Orders:  •  Hepatitis C Antibody; Future    Encounter for lipid screening for cardiovascular disease    Orders:  •  Lipid panel; Future    Iron deficiency  Will check iron panel.  Orders:  •  Basic metabolic panel; Future  •  Iron Panel (Includes Ferritin, Iron Sat%, Iron, and TIBC); Future    Vitamin D deficiency  Discussed vitamin somatization.       Other iron deficiency anemia  Will check iron panel.              Preventive Screenings:  - Diabetes Screening: risks/benefits discussed, screening up-to-date and orders placed  - Cholesterol Screening: risks/benefits discussed, screening up-to-date and orders placed   - Hepatitis C screening: orders placed   - HIV screening: screening not indicated   - Cervical cancer screening: screening up-to-date and risks/benefits discussed   - Breast cancer screening: screening up-to-date and  risks/benefits discussed   - Colon cancer screening: screening up-to-date and risks/benefits discussed   - Lung cancer screening: screening not indicated     Immunizations:  - Immunizations due: Prevnar 20 and Tdap  - Risks/benefits immunizations discussed    - The patient declines recommended vaccines currently despite my recommendations      Counseling/Anticipatory Guidance:  - Alcohol: discussed moderation in alcohol intake and recommendations for healthy alcohol use.   - Tobacco use: discussed harms of tobacco use and management options for quitting.   - Dental health: discussed importance of regular tooth brushing, flossing, and dental visits.   - Sexual health: discussed sexually transmitted diseases, partner selection, use of condoms, avoidance of unintended pregnancy, and contraceptive alternatives.   - Diet: discussed recommendations for a healthy/well-balanced diet.   - Exercise: the importance of regular exercise/physical activity was discussed. Recommend exercise 3-5 times per week for at least 30 minutes.   - Injury prevention: discussed safety/seat belts, safety helmets, smoke detectors, carbon monoxide detectors, and smoking near bedding or upholstery.       Depression Screening and Follow-up Plan: Patient was screened for depression during today's encounter. They screened negative with a PHQ-2 score of 0.          History of Present Illness     Adult Annual Physical:  Patient presents for annual physical. Valerie Gay is seen today for annual physical exam.     She has pain of her PIP joint of the right index finger. She cannot recall any known injury. She has pain with ROM. 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   She is having recurrence of onychomycosis of her right great toe. The toenail has fallen off.   .     Diet and Physical Activity:  - Diet/Nutrition: no special diet.  - Exercise: no formal exercise.    Depression Screening:  - PHQ-2 Score: 0    General Health:  -  "Sleep: 4-6 hours of sleep on average and sleeps well.  - Hearing: normal hearing bilateral ears.  - Vision: wears glasses, vision problems and most recent eye exam < 1 year ago.  - Dental: regular dental visits, brushes teeth twice daily and floss regularly.    /GYN Health:  - Follows with GYN: yes.   - Contraception: hysterectomy.      Review of Systems   Constitutional:  Negative for activity change, appetite change, chills, diaphoresis, fatigue and fever.   HENT:  Negative for congestion, postnasal drip, rhinorrhea, sinus pressure, sinus pain, sneezing and sore throat.    Eyes:  Negative for visual disturbance.   Respiratory:  Negative for apnea, cough, choking, chest tightness, shortness of breath and wheezing.    Cardiovascular:  Negative for chest pain, palpitations and leg swelling.   Gastrointestinal:  Negative for abdominal distention, abdominal pain, anal bleeding, blood in stool, constipation, diarrhea, nausea and vomiting.   Endocrine: Negative for cold intolerance and heat intolerance.   Genitourinary:  Negative for difficulty urinating, dysuria and hematuria.   Musculoskeletal: Negative.    Skin: Negative.    Neurological:  Negative for dizziness, weakness, light-headedness, numbness and headaches.   Hematological:  Negative for adenopathy.   Psychiatric/Behavioral:  Negative for agitation, sleep disturbance and suicidal ideas.    All other systems reviewed and are negative.        Objective   /72 (BP Location: Left arm, Patient Position: Sitting, Cuff Size: Standard)   Pulse 72   Temp 98.4 °F (36.9 °C) (Temporal)   Resp 18   Ht 5' 7\" (1.702 m)   Wt 69.9 kg (154 lb)   LMP 09/09/2019   SpO2 98%   BMI 24.12 kg/m²     Physical Exam  Vitals and nursing note reviewed.   Constitutional:       General: She is not in acute distress.     Appearance: She is well-developed. She is not diaphoretic.   HENT:      Head: Normocephalic and atraumatic.     Eyes:      General:         Right eye: No " discharge.         Left eye: No discharge.      Conjunctiva/sclera: Conjunctivae normal.      Pupils: Pupils are equal, round, and reactive to light.     Neck:      Thyroid: No thyromegaly.      Vascular: No JVD.     Cardiovascular:      Rate and Rhythm: Normal rate and regular rhythm.      Heart sounds: Normal heart sounds. No murmur heard.     No friction rub. No gallop.   Pulmonary:      Effort: Pulmonary effort is normal. No respiratory distress.      Breath sounds: Normal breath sounds. No wheezing or rales.   Chest:      Chest wall: No tenderness.   Abdominal:      General: There is no distension.      Palpations: Abdomen is soft.      Tenderness: There is no abdominal tenderness.     Musculoskeletal:         General: No tenderness or deformity. Normal range of motion.        Hands:       Cervical back: Normal range of motion and neck supple.   Lymphadenopathy:      Cervical: No cervical adenopathy.     Skin:     General: Skin is warm and dry.      Coloration: Skin is not pale.      Findings: No erythema or rash.     Neurological:      Mental Status: She is alert and oriented to person, place, and time.      Cranial Nerves: No cranial nerve deficit.      Coordination: Coordination normal.     Psychiatric:         Behavior: Behavior normal.         Thought Content: Thought content normal.         Judgment: Judgment normal.

## 2025-06-02 NOTE — ASSESSMENT & PLAN NOTE
Will treat with terbinafine.  Trend LFTs.  Orders:  •  terbinafine (LamISIL) 250 mg tablet; Take 1 tablet (250 mg total) by mouth daily  •  Hepatic function panel; Future  •  Hepatic function panel; Future

## 2025-06-02 NOTE — ASSESSMENT & PLAN NOTE
Stable, asymptomatic. Continue to monitor clinically. No further imaging for surveillance as per pulmonology.

## 2025-06-02 NOTE — ASSESSMENT & PLAN NOTE
Will prescribe Medrol dose pack. Discussed  ice compresses and reducing strain.   Orders:  •  methylPREDNISolone 4 MG tablet therapy pack; Use as directed on package

## 2025-06-02 NOTE — PATIENT INSTRUCTIONS
"Patient Education     Routine physical for adults   The Basics   Written by the doctors and editors at [facility]   What is a physical? -- A physical is a routine visit, or \"check-up,\" with your doctor. You might also hear it called a \"wellness visit\" or \"preventive visit.\"  During each visit, the doctor will:   Ask about your physical and mental health   Ask about your habits, behaviors, and lifestyle   Do an exam   Give you vaccines if needed   Talk to you about any medicines you take   Give advice about your health   Answer your questions  Getting regular check-ups is an important part of taking care of your health. It can help your doctor find and treat any problems you have. But it's also important for preventing health problems.  A routine physical is different from a \"sick visit.\" A sick visit is when you see a doctor because of a health concern or problem. Since physicals are scheduled ahead of time, you can think about what you want to ask the doctor.  How often should I get a physical? -- It depends on your age and health. In general, for people age 21 years and older:   If you are younger than 50 years, you might be able to get a physical every 3 years.   If you are 50 years or older, your doctor might recommend a physical every year.  If you have an ongoing health condition, like diabetes or high blood pressure, your doctor will probably want to see you more often.  What happens during a physical? -- In general, each visit will include:   Physical exam - The doctor or nurse will check your height, weight, heart rate, and blood pressure. They will also look at your eyes and ears. They will ask about how you are feeling and whether you have any symptoms that bother you.   Medicines - It's a good idea to bring a list of all the medicines you take to each doctor visit. Your doctor will talk to you about your medicines and answer any questions. Tell them if you are having any side effects that bother you. You " "should also tell them if you are having trouble paying for any of your medicines.   Habits and behaviors - This includes:   Your diet   Your exercise habits   Whether you smoke, drink alcohol, or use drugs   Whether you are sexually active   Whether you feel safe at home  Your doctor will talk to you about things you can do to improve your health and lower your risk of health problems. They will also offer help and support. For example, if you want to quit smoking, they can give you advice and might prescribe medicines. If you want to improve your diet or get more physical activity, they can help you with this, too.   Lab tests, if needed - The tests you get will depend on your age and situation. For example, your doctor might want to check your:   Cholesterol   Blood sugar   Iron level   Vaccines - The recommended vaccines will depend on your age, health, and what vaccines you already had. Vaccines are very important because they can prevent certain serious or deadly infections.   Discussion of screening - \"Screening\" means checking for diseases or other health problems before they cause symptoms. Your doctor can recommend screening based on your age, risk, and preferences. This might include tests to check for:   Cancer, such as breast, prostate, cervical, ovarian, colorectal, prostate, lung, or skin cancer   Sexually transmitted infections, such as chlamydia and gonorrhea   Mental health conditions like depression and anxiety  Your doctor will talk to you about the different types of screening tests. They can help you decide which screenings to have. They can also explain what the results might mean.   Answering questions - The physical is a good time to ask the doctor or nurse questions about your health. If needed, they can refer you to other doctors or specialists, too.  Adults older than 65 years often need other care, too. As you get older, your doctor will talk to you about:   How to prevent falling at " home   Hearing or vision tests   Memory testing   How to take your medicines safely   Making sure that you have the help and support you need at home  All topics are updated as new evidence becomes available and our peer review process is complete.  This topic retrieved from [employer] on: May 02, 2024.  Topic 664840 Version 1.0  Release: 32.4.3 - C32.122  © 2024 [employer] and/or its affiliates. All rights reserved.  Consumer Information Use and Disclaimer   Disclaimer: This generalized information is a limited summary of diagnosis, treatment, and/or medication information. It is not meant to be comprehensive and should be used as a tool to help the user understand and/or assess potential diagnostic and treatment options. It does NOT include all information about conditions, treatments, medications, side effects, or risks that may apply to a specific patient. It is not intended to be medical advice or a substitute for the medical advice, diagnosis, or treatment of a health care provider based on the health care provider's examination and assessment of a patient's specific and unique circumstances. Patients must speak with a health care provider for complete information about their health, medical questions, and treatment options, including any risks or benefits regarding use of medications. This information does not endorse any treatments or medications as safe, effective, or approved for treating a specific patient. [employer] and its affiliates disclaim any warranty or liability relating to this information or the use thereof.The use of this information is governed by the Terms of Use, available at https://www.woltersTransMedicsuwer.com/en/know/clinical-effectiveness-terms. 2024© [employer] and its affiliates and/or licensors. All rights reserved.  Copyright   © 2024 [employer] and/or its affiliates. All rights reserved.

## 2025-06-03 ENCOUNTER — APPOINTMENT (OUTPATIENT)
Dept: LAB | Facility: MEDICAL CENTER | Age: 50
End: 2025-06-03
Payer: COMMERCIAL

## 2025-06-03 DIAGNOSIS — Z13.6 ENCOUNTER FOR LIPID SCREENING FOR CARDIOVASCULAR DISEASE: ICD-10-CM

## 2025-06-03 DIAGNOSIS — E61.1 IRON DEFICIENCY: ICD-10-CM

## 2025-06-03 DIAGNOSIS — B35.1 ONYCHOMYCOSIS OF TOENAIL: ICD-10-CM

## 2025-06-03 DIAGNOSIS — Z13.220 ENCOUNTER FOR LIPID SCREENING FOR CARDIOVASCULAR DISEASE: ICD-10-CM

## 2025-06-03 DIAGNOSIS — Z11.59 NEED FOR HEPATITIS C SCREENING TEST: ICD-10-CM

## 2025-06-03 LAB
ALBUMIN SERPL BCG-MCNC: 4.1 G/DL (ref 3.5–5)
ALP SERPL-CCNC: 75 U/L (ref 34–104)
ALT SERPL W P-5'-P-CCNC: 10 U/L (ref 7–52)
ANION GAP SERPL CALCULATED.3IONS-SCNC: 7 MMOL/L (ref 4–13)
AST SERPL W P-5'-P-CCNC: 18 U/L (ref 13–39)
BILIRUB DIRECT SERPL-MCNC: 0.19 MG/DL (ref 0–0.2)
BILIRUB SERPL-MCNC: 0.91 MG/DL (ref 0.2–1)
BUN SERPL-MCNC: 14 MG/DL (ref 5–25)
CALCIUM SERPL-MCNC: 8.8 MG/DL (ref 8.4–10.2)
CHLORIDE SERPL-SCNC: 103 MMOL/L (ref 96–108)
CHOLEST SERPL-MCNC: 163 MG/DL (ref ?–200)
CO2 SERPL-SCNC: 29 MMOL/L (ref 21–32)
CREAT SERPL-MCNC: 0.56 MG/DL (ref 0.6–1.3)
FERRITIN SERPL-MCNC: 3 NG/ML (ref 30–307)
GFR SERPL CREATININE-BSD FRML MDRD: 109 ML/MIN/1.73SQ M
GLUCOSE SERPL-MCNC: 84 MG/DL (ref 65–140)
HDLC SERPL-MCNC: 64 MG/DL
IRON SATN MFR SERPL: 9 % (ref 15–50)
IRON SERPL-MCNC: 47 UG/DL (ref 50–212)
LDLC SERPL CALC-MCNC: 91 MG/DL (ref 0–100)
NONHDLC SERPL-MCNC: 99 MG/DL
POTASSIUM SERPL-SCNC: 4.3 MMOL/L (ref 3.5–5.3)
PROT SERPL-MCNC: 6.5 G/DL (ref 6.4–8.4)
SODIUM SERPL-SCNC: 139 MMOL/L (ref 135–147)
TIBC SERPL-MCNC: 511 UG/DL (ref 250–450)
TRANSFERRIN SERPL-MCNC: 365 MG/DL (ref 203–362)
TRIGL SERPL-MCNC: 42 MG/DL (ref ?–150)
UIBC SERPL-MCNC: 464 UG/DL (ref 155–355)

## 2025-06-03 PROCEDURE — 36415 COLL VENOUS BLD VENIPUNCTURE: CPT

## 2025-06-03 PROCEDURE — 83550 IRON BINDING TEST: CPT

## 2025-06-03 PROCEDURE — 82728 ASSAY OF FERRITIN: CPT

## 2025-06-03 PROCEDURE — 80048 BASIC METABOLIC PNL TOTAL CA: CPT

## 2025-06-03 PROCEDURE — 80061 LIPID PANEL: CPT

## 2025-06-03 PROCEDURE — 83540 ASSAY OF IRON: CPT

## 2025-06-03 PROCEDURE — 80076 HEPATIC FUNCTION PANEL: CPT

## 2025-06-03 PROCEDURE — 86803 HEPATITIS C AB TEST: CPT

## 2025-06-04 LAB — HCV AB SER QL: NORMAL

## 2025-06-09 ENCOUNTER — RESULTS FOLLOW-UP (OUTPATIENT)
Dept: INTERNAL MEDICINE CLINIC | Facility: OTHER | Age: 50
End: 2025-06-09

## 2025-06-09 DIAGNOSIS — D50.8 OTHER IRON DEFICIENCY ANEMIA: Primary | ICD-10-CM

## 2025-06-30 ENCOUNTER — HOSPITAL ENCOUNTER (OUTPATIENT)
Dept: MAMMOGRAPHY | Facility: MEDICAL CENTER | Age: 50
Discharge: HOME | End: 2025-06-30
Payer: COMMERCIAL

## 2025-06-30 VITALS — HEIGHT: 67 IN | WEIGHT: 154 LBS | BODY MASS INDEX: 24.17 KG/M2

## 2025-06-30 DIAGNOSIS — Z12.31 ENCOUNTER FOR SCREENING MAMMOGRAM FOR MALIGNANT NEOPLASM OF BREAST: ICD-10-CM

## 2025-06-30 PROCEDURE — 77063 BREAST TOMOSYNTHESIS BI: CPT

## 2025-06-30 PROCEDURE — 77067 SCR MAMMO BI INCL CAD: CPT

## 2025-07-05 ENCOUNTER — APPOINTMENT (OUTPATIENT)
Dept: LAB | Age: 50
End: 2025-07-05
Attending: INTERNAL MEDICINE
Payer: COMMERCIAL

## 2025-07-05 DIAGNOSIS — B35.1 ONYCHOMYCOSIS OF TOENAIL: ICD-10-CM

## 2025-07-05 LAB
ALBUMIN SERPL BCG-MCNC: 3.6 G/DL (ref 3.5–5)
ALP SERPL-CCNC: 73 U/L (ref 34–104)
ALT SERPL W P-5'-P-CCNC: 11 U/L (ref 7–52)
AST SERPL W P-5'-P-CCNC: 18 U/L (ref 13–39)
BILIRUB DIRECT SERPL-MCNC: 0.15 MG/DL (ref 0–0.2)
BILIRUB SERPL-MCNC: 0.56 MG/DL (ref 0.2–1)
PROT SERPL-MCNC: 5.8 G/DL (ref 6.4–8.4)

## 2025-07-05 PROCEDURE — 36415 COLL VENOUS BLD VENIPUNCTURE: CPT

## 2025-07-05 PROCEDURE — 80076 HEPATIC FUNCTION PANEL: CPT

## 2025-07-31 DIAGNOSIS — B35.1 ONYCHOMYCOSIS OF TOENAIL: ICD-10-CM

## 2025-07-31 RX ORDER — TERBINAFINE HYDROCHLORIDE 250 MG/1
250 TABLET ORAL DAILY
Qty: 90 TABLET | Refills: 1 | OUTPATIENT
Start: 2025-07-31 | End: 2026-01-27

## (undated) DEVICE — PREMIUM DRY TRAY LF: Brand: MEDLINE INDUSTRIES, INC.

## (undated) DEVICE — CHLORAPREP HI-LITE 26ML ORANGE

## (undated) DEVICE — Device: Brand: OLYMPUS

## (undated) DEVICE — PAD GROUNDING ADULT

## (undated) DEVICE — COBAN 4 IN STERILE

## (undated) DEVICE — 3000CC GUARDIAN II: Brand: GUARDIAN

## (undated) DEVICE — STERILE POLYISOPRENE POWDER-FREE SURGICAL GLOVES: Brand: PROTEXIS

## (undated) DEVICE — POV-IOD SOLUTION 4OZ BT

## (undated) DEVICE — ELECTRODE LAP J HOOK E-Z CLEAN 33CM-0021

## (undated) DEVICE — SUT VICRYL 4-0 PS-2 18 IN J496G

## (undated) DEVICE — GLOVE INDICATOR PI UNDERGLOVE SZ 8 BLUE

## (undated) DEVICE — KIT INCLUDES:GTB14, ALEXIS CONTAINED EXT SYS 5BXCNGL3, GELPOINT MINI ADV ACCESS PLATFORM: Brand: ALEXIS CONTAINED EXTRACTION SYSTEM WITH GELPOINT MINI ADVANCED ACCESS PLATFORM

## (undated) DEVICE — TROCAR: Brand: KII FIOS FIRST ENTRY

## (undated) DEVICE — KERLIX BANDAGE ROLL: Brand: KERLIX

## (undated) DEVICE — NEEDLE 25G X 1 1/2

## (undated) DEVICE — SUT ETHILON 4-0 PS-2 18 IN 1667H

## (undated) DEVICE — SUT SILK 0 SH 30 IN K834H

## (undated) DEVICE — BETHLEHEM UNIVERSAL  MIONR EXT: Brand: CARDINAL HEALTH

## (undated) DEVICE — INTENDED FOR TISSUE SEPARATION, AND OTHER PROCEDURES THAT REQUIRE A SHARP SURGICAL BLADE TO PUNCTURE OR CUT.: Brand: BARD-PARKER SAFETY BLADES SIZE 15, STERILE

## (undated) DEVICE — BLUE HEAT SCOPE WARMER

## (undated) DEVICE — INTENDED FOR TISSUE SEPARATION, AND OTHER PROCEDURES THAT REQUIRE A SHARP SURGICAL BLADE TO PUNCTURE OR CUT.: Brand: BARD-PARKER ® SAFETYLOCK CARBON RIB-BACK BLADES

## (undated) DEVICE — 4.0MM EGG BUR

## (undated) DEVICE — GAUZE SPONGES,16 PLY: Brand: CURITY

## (undated) DEVICE — SYRINGE 50ML LL

## (undated) DEVICE — SUT SILK 0 CT-1 30 IN 424H

## (undated) DEVICE — SUT VICRYL 3-0 SH 27 IN J416H

## (undated) DEVICE — SUT MONOCRYL 4-0 PS-2 27 IN Y426H

## (undated) DEVICE — SYRINGE 10ML LL CONTROL TOP

## (undated) DEVICE — PLASTIC ADHESIVE BANDAGE: Brand: CURITY

## (undated) DEVICE — PENCIL ELECTROSURG E-Z CLEAN -0035H

## (undated) DEVICE — SYRINGE 10ML LL

## (undated) DEVICE — ADHESIVE SKN CLSR HISTOACRYL FLEX 0.5ML LF

## (undated) DEVICE — TRAY FOLEY 16FR URIMETER SILICONE SURESTEP

## (undated) DEVICE — SUT PLAIN 3-0 PS-1 27 IN 1640H

## (undated) DEVICE — CURITY NON-ADHERENT STRIPS: Brand: CURITY

## (undated) DEVICE — INTENDED FOR TISSUE SEPARATION, AND OTHER PROCEDURES THAT REQUIRE A SHARP SURGICAL BLADE TO PUNCTURE OR CUT.: Brand: BARD-PARKER SAFETY BLADES SIZE 11, STERILE

## (undated) DEVICE — CUFF TOURNIQUET DISP SZ18

## (undated) DEVICE — SCD SEQUENTIAL COMPRESSION COMFORT SLEEVE MEDIUM KNEE LENGTH: Brand: KENDALL SCD

## (undated) DEVICE — IRRIG ENDO FLO TUBING

## (undated) DEVICE — GLOVE SRG LF STRL BGL SKNSNS 7 PF

## (undated) DEVICE — TUBING SUCTION 5MM X 12 FT

## (undated) DEVICE — 4-PORT MANIFOLD: Brand: NEPTUNE 2

## (undated) DEVICE — GLOVE PI ULTRA TOUCH SZ.7.5

## (undated) DEVICE — SUT VICRYL 0 UR-6 27 IN J603H

## (undated) DEVICE — TROCAR: Brand: KII SLEEVE

## (undated) DEVICE — STERILE POLYISOPRENE POWDER-FREE SURGICAL GLOVES WITH EMOLLIENT COATING: Brand: PROTEXIS

## (undated) DEVICE — OCCLUSIVE GAUZE STRIP,3% BISMUTH TRIBROMOPHENATE IN PETROLATUM BLEND: Brand: XEROFORM

## (undated) DEVICE — CUFF TOURNIQUET 18 X 4 IN QUICK CONNECT DISP 1 BLADDER

## (undated) DEVICE — ENDOPATH 5MM CURVED SCISSORS WITH MONOPOLAR CAUTERY: Brand: ENDOPATH

## (undated) DEVICE — GAUZE SPONGES,USP TYPE VII GAUZE, 12 PLY: Brand: CURITY

## (undated) DEVICE — NEEDLE BLUNT 18 G X 1 1/2IN

## (undated) DEVICE — MAYO STAND COVER: Brand: CONVERTORS

## (undated) DEVICE — STOCKINETTE 2P PREROLLD 6X60

## (undated) DEVICE — INSUFFLATION NEEDLE TO ESTABLISH PNEUMOPERITONEUM.: Brand: INSUFFLATION NEEDLE

## (undated) DEVICE — SINGLE PORT MANIFOLD: Brand: NEPTUNE 2

## (undated) DEVICE — DRAPE EQUIPMENT RF WAND

## (undated) DEVICE — UNDYED BRAIDED (POLYGLACTIN 910), SYNTHETIC ABSORBABLE SUTURE: Brand: COATED VICRYL

## (undated) DEVICE — LAPAROSCOPIC SMOKE EVAC TUBING

## (undated) DEVICE — [HIGH FLOW INSUFFLATOR,  DO NOT USE IF PACKAGE IS DAMAGED,  KEEP DRY,  KEEP AWAY FROM SUNLIGHT,  PROTECT FROM HEAT AND RADIOACTIVE SOURCES.]: Brand: PNEUMOSURE

## (undated) DEVICE — LIGASURE LAP SLR/DIV MARYLAND 5MM